# Patient Record
Sex: FEMALE | Race: WHITE | NOT HISPANIC OR LATINO | Employment: FULL TIME | URBAN - METROPOLITAN AREA
[De-identification: names, ages, dates, MRNs, and addresses within clinical notes are randomized per-mention and may not be internally consistent; named-entity substitution may affect disease eponyms.]

---
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
## 2018-01-16 ENCOUNTER — HOSPITAL ENCOUNTER (EMERGENCY)
Facility: MEDICAL CENTER | Age: 18
End: 2018-01-16
Attending: EMERGENCY MEDICINE
Payer: COMMERCIAL

## 2018-01-16 ENCOUNTER — APPOINTMENT (OUTPATIENT)
Dept: RADIOLOGY | Facility: MEDICAL CENTER | Age: 18
End: 2018-01-16
Attending: EMERGENCY MEDICINE
Payer: COMMERCIAL

## 2018-01-16 ENCOUNTER — HOSPITAL ENCOUNTER (OUTPATIENT)
Facility: MEDICAL CENTER | Age: 18
End: 2018-01-17
Attending: OBSTETRICS & GYNECOLOGY | Admitting: OBSTETRICS & GYNECOLOGY
Payer: COMMERCIAL

## 2018-01-16 ENCOUNTER — APPOINTMENT (OUTPATIENT)
Dept: RADIOLOGY | Facility: MEDICAL CENTER | Age: 18
End: 2018-01-16
Attending: OBSTETRICS & GYNECOLOGY
Payer: COMMERCIAL

## 2018-01-16 VITALS
TEMPERATURE: 98.8 F | HEIGHT: 64 IN | SYSTOLIC BLOOD PRESSURE: 129 MMHG | OXYGEN SATURATION: 95 % | HEART RATE: 107 BPM | BODY MASS INDEX: 50.02 KG/M2 | RESPIRATION RATE: 20 BRPM | WEIGHT: 293 LBS | DIASTOLIC BLOOD PRESSURE: 73 MMHG

## 2018-01-16 VITALS
WEIGHT: 293 LBS | OXYGEN SATURATION: 96 % | SYSTOLIC BLOOD PRESSURE: 132 MMHG | DIASTOLIC BLOOD PRESSURE: 84 MMHG | HEIGHT: 64 IN | BODY MASS INDEX: 50.02 KG/M2 | HEART RATE: 108 BPM

## 2018-01-16 DIAGNOSIS — J10.1 INFLUENZA B: ICD-10-CM

## 2018-01-16 DIAGNOSIS — Z3A.33 33 WEEKS GESTATION OF PREGNANCY: ICD-10-CM

## 2018-01-16 LAB
ALBUMIN SERPL BCP-MCNC: 2.9 G/DL (ref 3.2–4.9)
ALBUMIN/GLOB SERPL: 0.9 G/DL
ALP SERPL-CCNC: 72 U/L (ref 30–99)
ALT SERPL-CCNC: 17 U/L (ref 2–50)
ANION GAP SERPL CALC-SCNC: 11 MMOL/L (ref 0–11.9)
APPEARANCE UR: CLEAR
AST SERPL-CCNC: 20 U/L (ref 12–45)
BASOPHILS # BLD AUTO: 0.2 % (ref 0–1.8)
BASOPHILS # BLD: 0.01 K/UL (ref 0–0.12)
BILIRUB SERPL-MCNC: 0.5 MG/DL (ref 0.1–1.5)
BILIRUB UR QL STRIP.AUTO: NEGATIVE
BUN SERPL-MCNC: 6 MG/DL (ref 8–22)
CALCIUM SERPL-MCNC: 8.4 MG/DL (ref 8.4–10.2)
CHLORIDE SERPL-SCNC: 102 MMOL/L (ref 96–112)
CO2 SERPL-SCNC: 19 MMOL/L (ref 20–33)
COLOR UR: YELLOW
CREAT SERPL-MCNC: 0.54 MG/DL (ref 0.5–1.4)
CULTURE IF INDICATED INDCX: NO UA CULTURE
EOSINOPHIL # BLD AUTO: 0.05 K/UL (ref 0–0.51)
EOSINOPHIL NFR BLD: 1 % (ref 0–6.9)
ERYTHROCYTE [DISTWIDTH] IN BLOOD BY AUTOMATED COUNT: 39 FL (ref 35.9–50)
FLUAV+FLUBV AG SPEC QL IA: ABNORMAL
GLOBULIN SER CALC-MCNC: 3.2 G/DL (ref 1.9–3.5)
GLUCOSE SERPL-MCNC: 87 MG/DL (ref 65–99)
GLUCOSE UR STRIP.AUTO-MCNC: NEGATIVE MG/DL
HCT VFR BLD AUTO: 34.8 % (ref 37–47)
HGB BLD-MCNC: 12 G/DL (ref 12–16)
IMM GRANULOCYTES # BLD AUTO: 0.03 K/UL (ref 0–0.11)
IMM GRANULOCYTES NFR BLD AUTO: 0.6 % (ref 0–0.9)
KETONES UR STRIP.AUTO-MCNC: NEGATIVE MG/DL
LACTATE BLD-SCNC: 1.34 MMOL/L (ref 0.5–2)
LEUKOCYTE ESTERASE UR QL STRIP.AUTO: NEGATIVE
LYMPHOCYTES # BLD AUTO: 1.07 K/UL (ref 1–4.8)
LYMPHOCYTES NFR BLD: 21.9 % (ref 22–41)
MCH RBC QN AUTO: 28.6 PG (ref 27–33)
MCHC RBC AUTO-ENTMCNC: 34.5 G/DL (ref 33.6–35)
MCV RBC AUTO: 83.1 FL (ref 81.4–97.8)
MICRO URNS: NORMAL
MONOCYTES # BLD AUTO: 0.57 K/UL (ref 0–0.85)
MONOCYTES NFR BLD AUTO: 11.7 % (ref 0–13.4)
NEUTROPHILS # BLD AUTO: 3.16 K/UL (ref 2–7.15)
NEUTROPHILS NFR BLD: 64.6 % (ref 44–72)
NITRITE UR QL STRIP.AUTO: NEGATIVE
NRBC # BLD AUTO: 0 K/UL
NRBC BLD-RTO: 0 /100 WBC
PH UR STRIP.AUTO: 6 [PH]
PLATELET # BLD AUTO: 137 K/UL (ref 164–446)
PMV BLD AUTO: 10.5 FL (ref 9–12.9)
POTASSIUM SERPL-SCNC: 3.4 MMOL/L (ref 3.6–5.5)
PROT SERPL-MCNC: 6.1 G/DL (ref 6–8.2)
PROT UR QL STRIP: NEGATIVE MG/DL
RBC # BLD AUTO: 4.19 M/UL (ref 4.2–5.4)
RBC UR QL AUTO: NEGATIVE
S PYO AG THROAT QL: NORMAL
SIGNIFICANT IND 70042: ABNORMAL
SIGNIFICANT IND 70042: NORMAL
SITE SITE: ABNORMAL
SITE SITE: NORMAL
SODIUM SERPL-SCNC: 132 MMOL/L (ref 135–145)
SOURCE SOURCE: ABNORMAL
SOURCE SOURCE: NORMAL
SP GR UR STRIP.AUTO: 1.02
SPECIMEN DRAWN FROM PATIENT: NORMAL
WBC # BLD AUTO: 4.9 K/UL (ref 4.8–10.8)

## 2018-01-16 PROCEDURE — 96360 HYDRATION IV INFUSION INIT: CPT

## 2018-01-16 PROCEDURE — 71046 X-RAY EXAM CHEST 2 VIEWS: CPT

## 2018-01-16 PROCEDURE — 81003 URINALYSIS AUTO W/O SCOPE: CPT

## 2018-01-16 PROCEDURE — 83605 ASSAY OF LACTIC ACID: CPT

## 2018-01-16 PROCEDURE — 700102 HCHG RX REV CODE 250 W/ 637 OVERRIDE(OP): Performed by: EMERGENCY MEDICINE

## 2018-01-16 PROCEDURE — 99284 EMERGENCY DEPT VISIT MOD MDM: CPT

## 2018-01-16 PROCEDURE — 36415 COLL VENOUS BLD VENIPUNCTURE: CPT

## 2018-01-16 PROCEDURE — 700105 HCHG RX REV CODE 258: Performed by: EMERGENCY MEDICINE

## 2018-01-16 PROCEDURE — 87880 STREP A ASSAY W/OPTIC: CPT

## 2018-01-16 PROCEDURE — 87081 CULTURE SCREEN ONLY: CPT

## 2018-01-16 PROCEDURE — 700111 HCHG RX REV CODE 636 W/ 250 OVERRIDE (IP): Performed by: EMERGENCY MEDICINE

## 2018-01-16 PROCEDURE — A9270 NON-COVERED ITEM OR SERVICE: HCPCS | Performed by: EMERGENCY MEDICINE

## 2018-01-16 PROCEDURE — 87400 INFLUENZA A/B EACH AG IA: CPT

## 2018-01-16 PROCEDURE — 85025 COMPLETE CBC W/AUTO DIFF WBC: CPT

## 2018-01-16 PROCEDURE — 80053 COMPREHEN METABOLIC PANEL: CPT

## 2018-01-16 RX ORDER — OSELTAMIVIR PHOSPHATE 75 MG/1
75 CAPSULE ORAL ONCE
Status: COMPLETED | OUTPATIENT
Start: 2018-01-16 | End: 2018-01-16

## 2018-01-16 RX ORDER — OSELTAMIVIR PHOSPHATE 75 MG/1
75 CAPSULE ORAL 2 TIMES DAILY
Qty: 10 CAP | Refills: 0 | Status: SHIPPED | OUTPATIENT
Start: 2018-01-16 | End: 2018-01-16

## 2018-01-16 RX ORDER — SODIUM CHLORIDE 9 MG/ML
1000 INJECTION, SOLUTION INTRAVENOUS ONCE
Status: COMPLETED | OUTPATIENT
Start: 2018-01-16 | End: 2018-01-16

## 2018-01-16 RX ORDER — ACETAMINOPHEN 325 MG/1
650 TABLET ORAL ONCE
Status: COMPLETED | OUTPATIENT
Start: 2018-01-16 | End: 2018-01-16

## 2018-01-16 RX ORDER — OSELTAMIVIR PHOSPHATE 75 MG/1
75 CAPSULE ORAL 2 TIMES DAILY
Qty: 10 CAP | Refills: 0 | Status: SHIPPED | OUTPATIENT
Start: 2018-01-16 | End: 2018-01-21

## 2018-01-16 RX ORDER — ONDANSETRON 4 MG/1
4 TABLET, ORALLY DISINTEGRATING ORAL ONCE
Status: COMPLETED | OUTPATIENT
Start: 2018-01-16 | End: 2018-01-16

## 2018-01-16 RX ADMIN — ONDANSETRON 4 MG: 4 TABLET, ORALLY DISINTEGRATING ORAL at 17:45

## 2018-01-16 RX ADMIN — ACETAMINOPHEN 650 MG: 325 TABLET, FILM COATED ORAL at 18:35

## 2018-01-16 RX ADMIN — OSELTAMIVIR PHOSPHATE 75 MG: 75 CAPSULE ORAL at 17:45

## 2018-01-16 RX ADMIN — SODIUM CHLORIDE 1000 ML: 9 INJECTION, SOLUTION INTRAVENOUS at 20:46

## 2018-01-16 ASSESSMENT — PAIN SCALES - GENERAL: PAINLEVEL_OUTOF10: 3

## 2018-01-17 PROCEDURE — 76819 FETAL BIOPHYS PROFIL W/O NST: CPT

## 2018-01-17 NOTE — PROGRESS NOTES
Report called to charge nurse for L& D. Patient DCd with paperwork and told to go directly to ED at [facility]

## 2018-01-17 NOTE — DISCHARGE INSTRUCTIONS
Go directly to labor and delivery for evaluation.  Return to the ER if worsening fever, cough, shortness of breath or other concerns.      Pregnancy and Influenza  Influenza, also called the flu, is an infection of the respiratory tract. If you are pregnant, you are more likely to catch the flu. You are also more likely to have a more serious case of the flu. This is because pregnancy lowers your body's ability to fight off infections (it weakens your immune system). It also puts additional stress on your heart and lungs, which makes you more likely to have complications. Having a bad case of the flu, especially with a high fever, can be dangerous for your developing baby. It can cause you to go into early labor.  HOW DO PEOPLE GET THE FLU?  The flu is caused by the influenza virus. This virus is common every year in the fall and winter. It spreads when virus particles get passed from person to person. You can get the virus if you are near a sick person who is coughing or sneezing. You can also get the virus if you touch something that has the virus on it and then touch your face.  HOW CAN I PROTECT MYSELF AGAINST THE FLU?  · Get a flu shot. The best way to prevent the flu is to get a flu shot before flu season starts. The flu shot is not dangerous for your developing baby. It may even help protect your baby from the flu for up to 6 months after birth. The flu shot is one type of flu vaccine. Another type is a nasal spray vaccine. Do not get the nasal spray vaccine. It is not approved for pregnancy.  · Do not come in close contact with sick people.  · Do not share food, drinks, or utensils with other people.  · Wash your hands often. Use hand  when soap and water are not available.  WHAT SHOULD I DO IF I HAVE FLU SYMPTOMS?  If you have any flu symptoms, call your health care provider right away. Flu symptoms include:  · Fever or chills.  · Muscle aches.  · Headache.  · Sore throat.  · Nasal  congestion.  · Cough.  · Feeling tired.  · Loss of appetite.  · Vomiting.  · Diarrhea.  You may be able to take an antiviral medicine to keep the flu from becoming severe and to shorten how long it lasts.  WHAT SHOULD I DO AT HOME IF I AM DIAGNOSED WITH THE FLU?  · Do not take any medicine, including cold or flu medicine, unless directed by your health care provider.  · If you take antiviral medicine, make sure you finish it even if you start to feel better.  · Drink enough fluid to keep your urine clear or pale yellow.  · Get plenty of rest.  WHEN WOULD I SEEK IMMEDIATE MEDICAL CARE IF I HAVE THE FLU?  · You have trouble breathing.  · You have chest pain.  · You begin to have labor pains.  · You have a high fever that does not go down after you take medicine.  · You do not feel your baby move.  · You have diarrhea or vomiting that will not go away.     This information is not intended to replace advice given to you by your health care provider. Make sure you discuss any questions you have with your health care provider.     Document Released: 10/20/2009 Document Revised: 12/23/2014 Document Reviewed: 11/14/2014  [employer] Interactive Patient Education ©2016 [employer]

## 2018-01-17 NOTE — PROGRESS NOTES
EDC  34w4d. Pt presents to L&D for NST after being diagnosed with the flu at [facility].      TOCO and EFM applied, VSS. Pt's HR slightly elevated. PT was diagnosed with the flu and was recommenced by the ED doctor to come here for fetal assessment. PT reports +FM, denies LOF or VB. Due to maternal habitus, difficult to trace FHT after aprox. 20 mins by this RN. Will update Dr. Reddy.     Dr. Reddy updated, orders to obtain BPP received.     0025 US at bedside    0045 BPP , will update Dr. Reddy on pt status    0050 RN at bedside,  labor precautions given, all questions answered.     005 Pt discharged home in stable condition

## 2018-01-17 NOTE — PROGRESS NOTES
PAtient given instructions on where to go. PAtient IV removed and patient verbalized understanding. PAtient will be transferred POV.

## 2018-01-17 NOTE — ED PROVIDER NOTES
ED Provider Note    CHIEF COMPLAINT  Chief Complaint   Patient presents with   • Cough     cough runny nose  vomiting and diarrhea are now gone  cough is bad       HPI  Mitzi Abel is a 17 y.o. female who presents to the ER complaining of nausea, vomiting, diarrhea, and cough.  Symptoms started 2 days ago with vomiting and diarrhea.  This seemed to taper off.  Yesterday she developed runny nose and congestion and cough.  She has some chest wall discomfort associated with coughing is mild associated dyspnea.  She's been tolerating food and fluids.  Her last 24 hours.  She is pregnant.  Nausea problems urinating.  She is feeling baby move.  She has no abdominal pain, vaginal bleeding, fluid leakage or discharge.  She denies any headache.  She denies any other acute concerns or complaints.    REVIEW OF SYSTEMS  See HPI for further details. All other systems are negative.    PAST MEDICAL HISTORY  Past Medical History:   Diagnosis Date   • BMI, pediatric > 99% for age 10/15/2010   • Borderline personality disorder    • Eczema 10/15/2010   • Elevated blood pressure 10/15/2010   • H/O: Bell's palsy 12/26/08   • Insulin resistance 12/7/2011   • Major depressive disorder    • Myopia 10/15/2010   • Wheeze 10/15/2010       FAMILY HISTORY  Family History   Problem Relation Age of Onset   • Diabetes Mother      Gestational DM   • Genetic Mother      insulin resistance   • Hypertension Father    • Alcohol/Drug Father    • Thyroid Paternal Grandmother    • Diabetes Paternal Grandfather    • Heart Disease Paternal Grandfather    • Hypertension Paternal Grandfather    • Cancer Paternal Grandfather    • Psychiatry Brother      Bipolar/Bipolar       SOCIAL HISTORY  Social History     Social History   • Marital status:      Spouse name: N/A   • Number of children: N/A   • Years of education: N/A     Social History Main Topics   • Smoking status: Current Every Day Smoker     Packs/day: 0.25     Types: Cigarettes   •  "Smokeless tobacco: Never Used      Comment: 4 daily   • Alcohol use No   • Drug use: No      Comment: Stopped sence January   • Sexual activity: Yes     Partners: Male     Birth control/ protection: Condom      Comment: 1 month ago     Other Topics Concern   • Not on file     Social History Narrative   • No narrative on file       SURGICAL HISTORY  History reviewed. No pertinent surgical history.    CURRENT MEDICATIONS  Home Medications     Reviewed by Hernan Whitfield (Pharmacy Tech) on 01/16/18 at 1711  Med List Status: Complete   Medication Last Dose Status        Patient Uli Taking any Medications                       ALLERGIES  Allergies   Allergen Reactions   • Penicillins        PHYSICAL EXAM  VITAL SIGNS: /85   Pulse (!) 101   Temp 37.1 °C (98.8 °F)   Resp 20   Ht 1.626 m (5' 4\")   Wt (!) 143.5 kg (316 lb 5.8 oz)   LMP 04/30/2017 (Approximate)   SpO2 95%   BMI 54.30 kg/m²    Constitutional: Well developed, Well nourished, No acute distress, Non-toxic appearance.   HENT: Normocephalic, Atraumatic, Bilateral external ears normal, Oropharynx moist, No oral exudates, swollen nasal mucosa, clear rhinorrhea.  Mild pharyngeal erythema.  No exudates.  Eyes: PERRL, EOMI, Conjunctiva normal, No discharge.   Neck: Normal range of motion, No tenderness, Supple, No stridor.   Cardiovascular: Normal heart rate, Normal rhythm, No murmurs, No rubs, No gallops.   Thorax & Lungs: Normal breath sounds, No respiratory distress, No wheezing, No chest tenderness.   Abdomen: Bowel sounds normal, Soft, No tenderness  Skin: Warm, Dry, No erythema, No rash.   Back: No tenderness, No CVA tenderness.   Musculoskeletal: Good range of motion in all major joints.   Neurologic: Alert & oriented x 3, No focal deficits noted.   Psychiatric: Affect normal        RADIOLOGY/PROCEDURES  DX-CHEST-2 VIEWS   Final Result      Mild linear retrocardiac opacity likely represents atelectasis.          COURSE & MEDICAL DECISION " MAKING  Pertinent Labs & Imaging studies reviewed. (See chart for details)  Patient presents to ER with cough and cold symptoms and has influenza.  Strep test is negative.  Chest x-ray does not show an infiltrate.  The patient's urinalysis is negative.    Patient was here for some time, being observed getting oral fluids and antipyretics and some Tamiflu.  The patient has been persistent tachycardia and she looked terrible.  She also does not look well.  Bedside ultrasound was done to her fetal heart tones 130 range.        The patient is here for a prolonged period of time, especially tachycardic.  I think she should be admitted for influenza and pregnancy.  I spoke with Dr. Reddy, on call for her ObGyn doctor and he wanted to see her before she was admitted.  He requested I send her to [facility] to be evaluated at labor and delivery.  The patient is agreeable to this.  She received a liter of saline.  Her labs are reassuring.  I discussed and the fetal heart rate of mostly 120s to 130 range.  She felt this was normal and not urgent.  He will see her in labor and delivery tonight.    The patient and family will go directly there.  She is given very close return precautions for influenza given she is discharged.  She is prescribed Tamiflu.  She is to return if worsening cough, shortness of breath, fever, ill appearance, or other concerns.  She is transferred via POV [facility] to Dr. Reddy for continued evaluation and treatment of her pregnancy influenza.      Addendum: The patient was given 1 L of saline while here.  Indication for IV fluids while here.  His persistent tachycardia in the setting of an illness with a prodrome of vomiting and diarrhea and potential volume loss.    FINAL IMPRESSION  1. Influenza B    2. 28 weeks gestation of pregnancy        2.   3.         Electronically signed by: Thanh Mcdaniel, 1/16/2018 5:23 PM

## 2018-01-17 NOTE — ED NOTES
ERP to bedside for re-evaluation of fetal heart tones. New orders received and implemented. Patient updated on plan of care including transfer to [facility].   .   Partner educated on smoking cessation.

## 2018-01-18 LAB
S PYO SPEC QL CULT: NORMAL
SIGNIFICANT IND 70042: NORMAL
SITE SITE: NORMAL
SOURCE SOURCE: NORMAL

## 2018-02-01 ENCOUNTER — HOSPITAL ENCOUNTER (OUTPATIENT)
Facility: MEDICAL CENTER | Age: 18
End: 2018-02-01
Attending: OBSTETRICS & GYNECOLOGY | Admitting: OBSTETRICS & GYNECOLOGY
Payer: COMMERCIAL

## 2018-02-01 VITALS — SYSTOLIC BLOOD PRESSURE: 140 MMHG | DIASTOLIC BLOOD PRESSURE: 90 MMHG | HEART RATE: 89 BPM

## 2018-02-01 LAB
ALBUMIN SERPL BCP-MCNC: 3.6 G/DL (ref 3.2–4.9)
ALBUMIN/GLOB SERPL: 1.3 G/DL
ALP SERPL-CCNC: 77 U/L (ref 30–99)
ALT SERPL-CCNC: 7 U/L (ref 2–50)
ANION GAP SERPL CALC-SCNC: 8 MMOL/L (ref 0–11.9)
AST SERPL-CCNC: 10 U/L (ref 12–45)
BASOPHILS # BLD AUTO: 0.4 % (ref 0–1.8)
BASOPHILS # BLD: 0.03 K/UL (ref 0–0.12)
BILIRUB SERPL-MCNC: 0.4 MG/DL (ref 0.1–1.5)
BUN SERPL-MCNC: 7 MG/DL (ref 8–22)
CALCIUM SERPL-MCNC: 8.9 MG/DL (ref 8.5–10.5)
CHLORIDE SERPL-SCNC: 106 MMOL/L (ref 96–112)
CO2 SERPL-SCNC: 21 MMOL/L (ref 20–33)
CREAT SERPL-MCNC: 0.4 MG/DL (ref 0.5–1.4)
CREAT UR-MCNC: 64.1 MG/DL
EOSINOPHIL # BLD AUTO: 0.05 K/UL (ref 0–0.51)
EOSINOPHIL NFR BLD: 0.6 % (ref 0–6.9)
ERYTHROCYTE [DISTWIDTH] IN BLOOD BY AUTOMATED COUNT: 40.3 FL (ref 35.9–50)
GLOBULIN SER CALC-MCNC: 2.8 G/DL (ref 1.9–3.5)
GLUCOSE SERPL-MCNC: 96 MG/DL (ref 65–99)
HCT VFR BLD AUTO: 35.4 % (ref 37–47)
HGB BLD-MCNC: 12.1 G/DL (ref 12–16)
IMM GRANULOCYTES # BLD AUTO: 0.03 K/UL (ref 0–0.11)
IMM GRANULOCYTES NFR BLD AUTO: 0.4 % (ref 0–0.9)
LYMPHOCYTES # BLD AUTO: 1.9 K/UL (ref 1–4.8)
LYMPHOCYTES NFR BLD: 22.7 % (ref 22–41)
MCH RBC QN AUTO: 29.2 PG (ref 27–33)
MCHC RBC AUTO-ENTMCNC: 34.2 G/DL (ref 33.6–35)
MCV RBC AUTO: 85.3 FL (ref 81.4–97.8)
MONOCYTES # BLD AUTO: 0.81 K/UL (ref 0–0.85)
MONOCYTES NFR BLD AUTO: 9.7 % (ref 0–13.4)
NEUTROPHILS # BLD AUTO: 5.54 K/UL (ref 2–7.15)
NEUTROPHILS NFR BLD: 66.2 % (ref 44–72)
NRBC # BLD AUTO: 0 K/UL
NRBC BLD-RTO: 0 /100 WBC
PLATELET # BLD AUTO: 185 K/UL (ref 164–446)
PMV BLD AUTO: 11 FL (ref 9–12.9)
POTASSIUM SERPL-SCNC: 3.6 MMOL/L (ref 3.6–5.5)
PROT SERPL-MCNC: 6.4 G/DL (ref 6–8.2)
PROT UR-MCNC: 9.2 MG/DL (ref 0–15)
PROT/CREAT UR: 144 MG/G (ref 10–107)
RBC # BLD AUTO: 4.15 M/UL (ref 4.2–5.4)
SODIUM SERPL-SCNC: 135 MMOL/L (ref 135–145)
URATE SERPL-MCNC: 3.3 MG/DL (ref 1.9–8.2)
WBC # BLD AUTO: 8.4 K/UL (ref 4.8–10.8)

## 2018-02-01 PROCEDURE — 84156 ASSAY OF PROTEIN URINE: CPT

## 2018-02-01 PROCEDURE — 82570 ASSAY OF URINE CREATININE: CPT

## 2018-02-01 PROCEDURE — 59025 FETAL NON-STRESS TEST: CPT | Performed by: OBSTETRICS & GYNECOLOGY

## 2018-02-01 PROCEDURE — 85025 COMPLETE CBC W/AUTO DIFF WBC: CPT

## 2018-02-01 PROCEDURE — 80053 COMPREHEN METABOLIC PANEL: CPT

## 2018-02-01 PROCEDURE — 36415 COLL VENOUS BLD VENIPUNCTURE: CPT

## 2018-02-01 PROCEDURE — 84550 ASSAY OF BLOOD/URIC ACID: CPT

## 2018-02-02 NOTE — PROGRESS NOTES
Patient came over for [facility] labs.she is a G-1 P-0 with EDC of 2-23 which makes her 36.6.EFM applied and POC discussed.serial B/P's.1710 lab here to draw labs.1756 obtained a reactive strip with moderate variability.baby is moving all over.Dr lomeli here-she was updated,looked at labs and strip.patient discharged home in stable condition with fob.

## 2018-02-03 ENCOUNTER — HOSPITAL ENCOUNTER (OUTPATIENT)
Facility: MEDICAL CENTER | Age: 18
End: 2018-02-04
Attending: OBSTETRICS & GYNECOLOGY | Admitting: OBSTETRICS & GYNECOLOGY
Payer: COMMERCIAL

## 2018-02-03 VITALS
DIASTOLIC BLOOD PRESSURE: 73 MMHG | HEIGHT: 64 IN | TEMPERATURE: 98.7 F | WEIGHT: 293 LBS | HEART RATE: 96 BPM | BODY MASS INDEX: 50.02 KG/M2 | SYSTOLIC BLOOD PRESSURE: 119 MMHG

## 2018-02-03 PROCEDURE — 81002 URINALYSIS NONAUTO W/O SCOPE: CPT

## 2018-02-04 NOTE — PROGRESS NOTES
"2320- [facility] 2018 37.1 weeks presents to L&D with c/o headache that started around 1600 today and unrelieved by tylenol. Pt was sent over from office on 2018 for PIH workup. Labs WNL. TOCO/FM applied. BP WNL, UA clear with no protein.  Unable to get FHT for >15 seconds at a time d/t patients large habitus. -150 when able to . RN spend more than 40 minutes with 2 RN's trying to keep baby on for reactive NST.  0020-Dr. Thompson called and updated on pt. And  Aware that NST was unable to obtain. Orders rcvd to give pt 2 percocet for headache and monitor for effectiveness. If pt's headache better may DC home.  0030-RN at bedside to offer percocet to pt but pt states, \"Me and pain meds dont get along. I just throw them up\". Pt refusing any meds and just wants to go home. DC instructions and labor precautions reviewed with pt and FOB. Pt has appt. On Wednesday in the office. Pt ambulated out in stable condition.  "

## 2018-02-05 LAB
APPEARANCE UR: CLEAR
COLOR UR AUTO: YELLOW
GLUCOSE UR QL STRIP.AUTO: NEGATIVE MG/DL
KETONES UR QL STRIP.AUTO: NEGATIVE MG/DL
LEUKOCYTE ESTERASE UR QL STRIP.AUTO: ABNORMAL
NITRITE UR QL STRIP.AUTO: NEGATIVE
PH UR STRIP.AUTO: 6 [PH]
PROT UR QL STRIP: NEGATIVE MG/DL
RBC UR QL AUTO: NEGATIVE
SP GR UR: 1.01

## 2018-02-20 ENCOUNTER — HOSPITAL ENCOUNTER (OUTPATIENT)
Facility: MEDICAL CENTER | Age: 18
End: 2018-02-20
Attending: OBSTETRICS & GYNECOLOGY | Admitting: OBSTETRICS & GYNECOLOGY
Payer: COMMERCIAL

## 2018-02-20 VITALS — SYSTOLIC BLOOD PRESSURE: 144 MMHG | HEART RATE: 96 BPM | TEMPERATURE: 99 F | DIASTOLIC BLOOD PRESSURE: 80 MMHG

## 2018-02-20 PROCEDURE — 59025 FETAL NON-STRESS TEST: CPT | Performed by: OBSTETRICS & GYNECOLOGY

## 2018-02-20 NOTE — PROGRESS NOTES
Came in for ob check.EFM applied and POC discussed.she is due 2-22 which makes her 39.5weeks.she has not felt her baby move for 3 days.found FHR.hand holding doppler because baby is moving well.patient has anterior placenta.several accels with moderate variability.patient does feel baby move.Dr mcmahon called and informed.patient can be discharged home and to keep her appt in a couple of days with Dr mcmahon..patient encouraged to do kick counts and to eat and drink before doing them.It was reinforced to never wait 3 days of no fetal movement to come into hospital.It was explained that if she doesn't get her kick counts to come to the hospital to be monitored.patient said she understands.1515 discharged home in stable condition with fob.

## 2018-02-27 ENCOUNTER — HOSPITAL ENCOUNTER (INPATIENT)
Facility: MEDICAL CENTER | Age: 18
LOS: 3 days | End: 2018-03-02
Attending: OBSTETRICS & GYNECOLOGY | Admitting: OBSTETRICS & GYNECOLOGY
Payer: COMMERCIAL

## 2018-02-27 LAB
ALBUMIN SERPL BCP-MCNC: 3.5 G/DL (ref 3.2–4.9)
ALBUMIN/GLOB SERPL: 1 G/DL
ALP SERPL-CCNC: 106 U/L (ref 30–99)
ALT SERPL-CCNC: 8 U/L (ref 2–50)
ANION GAP SERPL CALC-SCNC: 11 MMOL/L (ref 0–11.9)
APPEARANCE UR: CLEAR
AST SERPL-CCNC: 10 U/L (ref 12–45)
BASOPHILS # BLD AUTO: 0.4 % (ref 0–1.8)
BASOPHILS # BLD: 0.05 K/UL (ref 0–0.12)
BILIRUB SERPL-MCNC: 0.3 MG/DL (ref 0.1–1.5)
BUN SERPL-MCNC: 9 MG/DL (ref 8–22)
CALCIUM SERPL-MCNC: 9.6 MG/DL (ref 8.5–10.5)
CHLORIDE SERPL-SCNC: 106 MMOL/L (ref 96–112)
CO2 SERPL-SCNC: 17 MMOL/L (ref 20–33)
COLOR UR AUTO: YELLOW
CREAT SERPL-MCNC: 0.53 MG/DL (ref 0.5–1.4)
EOSINOPHIL # BLD AUTO: 0.06 K/UL (ref 0–0.51)
EOSINOPHIL NFR BLD: 0.5 % (ref 0–6.9)
ERYTHROCYTE [DISTWIDTH] IN BLOOD BY AUTOMATED COUNT: 39.4 FL (ref 35.9–50)
GLOBULIN SER CALC-MCNC: 3.5 G/DL (ref 1.9–3.5)
GLUCOSE SERPL-MCNC: 97 MG/DL (ref 65–99)
GLUCOSE UR QL STRIP.AUTO: NEGATIVE MG/DL
HCT VFR BLD AUTO: 39.8 % (ref 37–47)
HGB BLD-MCNC: 13.1 G/DL (ref 12–16)
HOLDING TUBE BB 8507: NORMAL
IMM GRANULOCYTES # BLD AUTO: 0.05 K/UL (ref 0–0.11)
IMM GRANULOCYTES NFR BLD AUTO: 0.4 % (ref 0–0.9)
KETONES UR QL STRIP.AUTO: NEGATIVE MG/DL
LEUKOCYTE ESTERASE UR QL STRIP.AUTO: ABNORMAL
LYMPHOCYTES # BLD AUTO: 2.23 K/UL (ref 1–4.8)
LYMPHOCYTES NFR BLD: 20.1 % (ref 22–41)
MCH RBC QN AUTO: 28.1 PG (ref 27–33)
MCHC RBC AUTO-ENTMCNC: 32.9 G/DL (ref 33.6–35)
MCV RBC AUTO: 85.4 FL (ref 81.4–97.8)
MONOCYTES # BLD AUTO: 0.75 K/UL (ref 0–0.85)
MONOCYTES NFR BLD AUTO: 6.7 % (ref 0–13.4)
NEUTROPHILS # BLD AUTO: 7.98 K/UL (ref 2–7.15)
NEUTROPHILS NFR BLD: 71.9 % (ref 44–72)
NITRITE UR QL STRIP.AUTO: NEGATIVE
NRBC # BLD AUTO: 0 K/UL
NRBC BLD-RTO: 0 /100 WBC
PH UR STRIP.AUTO: 6.5 [PH]
PLATELET # BLD AUTO: 195 K/UL (ref 164–446)
PMV BLD AUTO: 11.4 FL (ref 9–12.9)
POTASSIUM SERPL-SCNC: 4 MMOL/L (ref 3.6–5.5)
PROT SERPL-MCNC: 7 G/DL (ref 6–8.2)
PROT UR QL STRIP: NEGATIVE MG/DL
RBC # BLD AUTO: 4.66 M/UL (ref 4.2–5.4)
RBC UR QL AUTO: NEGATIVE
SODIUM SERPL-SCNC: 134 MMOL/L (ref 135–145)
SP GR UR: 1.02
URATE SERPL-MCNC: 4.5 MG/DL (ref 1.9–8.2)
WBC # BLD AUTO: 11.1 K/UL (ref 4.8–10.8)

## 2018-02-27 PROCEDURE — 81002 URINALYSIS NONAUTO W/O SCOPE: CPT

## 2018-02-27 PROCEDURE — 84156 ASSAY OF PROTEIN URINE: CPT

## 2018-02-27 PROCEDURE — 85025 COMPLETE CBC W/AUTO DIFF WBC: CPT

## 2018-02-27 PROCEDURE — 770002 HCHG ROOM/CARE - OB PRIVATE (112)

## 2018-02-27 PROCEDURE — 82570 ASSAY OF URINE CREATININE: CPT

## 2018-02-27 PROCEDURE — 80053 COMPREHEN METABOLIC PANEL: CPT

## 2018-02-27 PROCEDURE — 84550 ASSAY OF BLOOD/URIC ACID: CPT

## 2018-02-27 RX ORDER — SODIUM CHLORIDE, SODIUM LACTATE, POTASSIUM CHLORIDE, CALCIUM CHLORIDE 600; 310; 30; 20 MG/100ML; MG/100ML; MG/100ML; MG/100ML
INJECTION, SOLUTION INTRAVENOUS CONTINUOUS
Status: DISPENSED | OUTPATIENT
Start: 2018-02-27 | End: 2018-02-28

## 2018-02-27 RX ORDER — ONDANSETRON 2 MG/ML
4 INJECTION INTRAMUSCULAR; INTRAVENOUS EVERY 6 HOURS PRN
Status: DISCONTINUED | OUTPATIENT
Start: 2018-02-27 | End: 2018-02-28 | Stop reason: HOSPADM

## 2018-02-27 RX ORDER — CITRIC ACID/SODIUM CITRATE 334-500MG
30 SOLUTION, ORAL ORAL EVERY 6 HOURS PRN
Status: DISCONTINUED | OUTPATIENT
Start: 2018-02-27 | End: 2018-02-28 | Stop reason: HOSPADM

## 2018-02-27 RX ORDER — ACETAMINOPHEN 325 MG/1
325 TABLET ORAL EVERY 4 HOURS PRN
Status: DISCONTINUED | OUTPATIENT
Start: 2018-02-27 | End: 2018-02-28 | Stop reason: HOSPADM

## 2018-02-27 RX ORDER — METOCLOPRAMIDE HYDROCHLORIDE 5 MG/ML
10 INJECTION INTRAMUSCULAR; INTRAVENOUS EVERY 6 HOURS PRN
Status: DISCONTINUED | OUTPATIENT
Start: 2018-02-27 | End: 2018-02-28 | Stop reason: HOSPADM

## 2018-02-27 RX ORDER — OXYCODONE HYDROCHLORIDE AND ACETAMINOPHEN 5; 325 MG/1; MG/1
1 TABLET ORAL EVERY 4 HOURS PRN
Status: DISCONTINUED | OUTPATIENT
Start: 2018-02-27 | End: 2018-02-28 | Stop reason: HOSPADM

## 2018-02-27 RX ORDER — OXYCODONE AND ACETAMINOPHEN 10; 325 MG/1; MG/1
1 TABLET ORAL EVERY 4 HOURS PRN
Status: DISCONTINUED | OUTPATIENT
Start: 2018-02-27 | End: 2018-02-28 | Stop reason: HOSPADM

## 2018-02-27 RX ORDER — MISOPROSTOL 200 UG/1
800 TABLET ORAL
Status: DISCONTINUED | OUTPATIENT
Start: 2018-02-27 | End: 2018-02-28 | Stop reason: HOSPADM

## 2018-02-27 RX ORDER — IBUPROFEN 600 MG/1
600 TABLET ORAL EVERY 6 HOURS PRN
Status: DISCONTINUED | OUTPATIENT
Start: 2018-02-27 | End: 2018-02-28 | Stop reason: HOSPADM

## 2018-02-27 RX ORDER — CARBOPROST TROMETHAMINE 250 UG/ML
250 INJECTION, SOLUTION INTRAMUSCULAR
Status: DISCONTINUED | OUTPATIENT
Start: 2018-02-27 | End: 2018-02-28 | Stop reason: HOSPADM

## 2018-02-27 RX ORDER — ONDANSETRON 4 MG/1
4 TABLET, ORALLY DISINTEGRATING ORAL EVERY 6 HOURS PRN
Status: DISCONTINUED | OUTPATIENT
Start: 2018-02-27 | End: 2018-02-28 | Stop reason: HOSPADM

## 2018-02-27 ASSESSMENT — PATIENT HEALTH QUESTIONNAIRE - PHQ9
2. FEELING DOWN, DEPRESSED, IRRITABLE, OR HOPELESS: NOT AT ALL
1. LITTLE INTEREST OR PLEASURE IN DOING THINGS: NOT AT ALL
SUM OF ALL RESPONSES TO PHQ QUESTIONS 1-9: 0
SUM OF ALL RESPONSES TO PHQ9 QUESTIONS 1 AND 2: 0

## 2018-02-27 ASSESSMENT — LIFESTYLE VARIABLES
ALCOHOL_USE: NO
DO YOU DRINK ALCOHOL: NO
EVER_SMOKED: YES

## 2018-02-28 LAB
AMPHET UR QL SCN: NEGATIVE
BARBITURATES UR QL SCN: NEGATIVE
BENZODIAZ UR QL SCN: NEGATIVE
BZE UR QL SCN: NEGATIVE
CANNABINOIDS UR QL SCN: NEGATIVE
CREAT UR-MCNC: 119.7 MG/DL
METHADONE UR QL SCN: NEGATIVE
OPIATES UR QL SCN: NEGATIVE
OXYCODONE UR QL SCN: NEGATIVE
PCP UR QL SCN: NEGATIVE
PROPOXYPH UR QL SCN: NEGATIVE
PROT UR-MCNC: 10.9 MG/DL (ref 0–15)
PROT/CREAT UR: 91 MG/G (ref 10–107)

## 2018-02-28 PROCEDURE — 700101 HCHG RX REV CODE 250

## 2018-02-28 PROCEDURE — 59409 OBSTETRICAL CARE: CPT

## 2018-02-28 PROCEDURE — 36415 COLL VENOUS BLD VENIPUNCTURE: CPT

## 2018-02-28 PROCEDURE — 700101 HCHG RX REV CODE 250: Performed by: OBSTETRICS & GYNECOLOGY

## 2018-02-28 PROCEDURE — 303615 HCHG EPIDURAL/SPINAL ANESTHESIA FOR LABOR

## 2018-02-28 PROCEDURE — 700111 HCHG RX REV CODE 636 W/ 250 OVERRIDE (IP): Performed by: OBSTETRICS & GYNECOLOGY

## 2018-02-28 PROCEDURE — 770002 HCHG ROOM/CARE - OB PRIVATE (112)

## 2018-02-28 PROCEDURE — 700105 HCHG RX REV CODE 258: Performed by: OBSTETRICS & GYNECOLOGY

## 2018-02-28 PROCEDURE — 0UQGXZZ REPAIR VAGINA, EXTERNAL APPROACH: ICD-10-PCS | Performed by: OBSTETRICS & GYNECOLOGY

## 2018-02-28 PROCEDURE — 80307 DRUG TEST PRSMV CHEM ANLYZR: CPT

## 2018-02-28 PROCEDURE — 700102 HCHG RX REV CODE 250 W/ 637 OVERRIDE(OP): Performed by: OBSTETRICS & GYNECOLOGY

## 2018-02-28 PROCEDURE — 700111 HCHG RX REV CODE 636 W/ 250 OVERRIDE (IP)

## 2018-02-28 PROCEDURE — A9270 NON-COVERED ITEM OR SERVICE: HCPCS | Performed by: OBSTETRICS & GYNECOLOGY

## 2018-02-28 PROCEDURE — 304965 HCHG RECOVERY SERVICES

## 2018-02-28 RX ORDER — SODIUM CHLORIDE, SODIUM LACTATE, POTASSIUM CHLORIDE, CALCIUM CHLORIDE 600; 310; 30; 20 MG/100ML; MG/100ML; MG/100ML; MG/100ML
INJECTION, SOLUTION INTRAVENOUS PRN
Status: DISCONTINUED | OUTPATIENT
Start: 2018-02-28 | End: 2018-03-02 | Stop reason: HOSPADM

## 2018-02-28 RX ORDER — LABETALOL HYDROCHLORIDE 5 MG/ML
10 INJECTION, SOLUTION INTRAVENOUS ONCE
Status: COMPLETED | OUTPATIENT
Start: 2018-02-28 | End: 2018-02-28

## 2018-02-28 RX ORDER — ROPIVACAINE HYDROCHLORIDE 2 MG/ML
INJECTION, SOLUTION EPIDURAL; INFILTRATION; PERINEURAL
Status: COMPLETED
Start: 2018-02-28 | End: 2018-02-28

## 2018-02-28 RX ORDER — MISOPROSTOL 200 UG/1
600 TABLET ORAL
Status: DISCONTINUED | OUTPATIENT
Start: 2018-02-28 | End: 2018-03-02 | Stop reason: HOSPADM

## 2018-02-28 RX ORDER — DOCUSATE SODIUM 100 MG/1
100 CAPSULE, LIQUID FILLED ORAL 2 TIMES DAILY PRN
Status: DISCONTINUED | OUTPATIENT
Start: 2018-02-28 | End: 2018-03-02 | Stop reason: HOSPADM

## 2018-02-28 RX ORDER — BUPIVACAINE HYDROCHLORIDE 2.5 MG/ML
INJECTION, SOLUTION EPIDURAL; INFILTRATION; INTRACAUDAL
Status: ACTIVE
Start: 2018-02-28 | End: 2018-02-28

## 2018-02-28 RX ADMIN — Medication 1 MILLI-UNITS/MIN: at 10:00

## 2018-02-28 RX ADMIN — IBUPROFEN 600 MG: 600 TABLET, FILM COATED ORAL at 17:15

## 2018-02-28 RX ADMIN — Medication 125 ML/HR: at 19:28

## 2018-02-28 RX ADMIN — ROPIVACAINE HYDROCHLORIDE 100 ML: 2 INJECTION, SOLUTION EPIDURAL; INFILTRATION; PERINEURAL at 10:04

## 2018-02-28 RX ADMIN — LABETALOL HYDROCHLORIDE 10 MG: 5 INJECTION, SOLUTION INTRAVENOUS at 18:22

## 2018-02-28 RX ADMIN — SODIUM CHLORIDE, POTASSIUM CHLORIDE, SODIUM LACTATE AND CALCIUM CHLORIDE: 600; 310; 30; 20 INJECTION, SOLUTION INTRAVENOUS at 01:06

## 2018-02-28 RX ADMIN — Medication 2 MILLI-UNITS/MIN: at 05:15

## 2018-02-28 RX ADMIN — ROPIVACAINE HYDROCHLORIDE 100 ML: 2 INJECTION, SOLUTION EPIDURAL; INFILTRATION; PERINEURAL at 01:19

## 2018-02-28 RX ADMIN — SODIUM CHLORIDE, POTASSIUM CHLORIDE, SODIUM LACTATE AND CALCIUM CHLORIDE: 600; 310; 30; 20 INJECTION, SOLUTION INTRAVENOUS at 00:45

## 2018-02-28 ASSESSMENT — PAIN SCALES - GENERAL
PAINLEVEL_OUTOF10: 2
PAINLEVEL_OUTOF10: 0

## 2018-02-28 NOTE — PROGRESS NOTES
1250 MD at bedside. C/-1. Orders to labor down for one hour. FOB, mom, and sister at bedside.    1300 report from RAI Lomeli. Pt stable and alert- feeling urge to push. Will reassess after 1 hour.    1345 pt called out for back pain. Pt assessed with alcohol pad- dermatome T10. Pt repositioned and educated that discomfort is common to feel once cervix has become complete. Pt understands. Will begin pushing at appx 1400.    1415 Start pushing.    1616 MD requested at bedside. Pt coping poorly with pushing. Pt is teary and keeps stating 'I can't, you need to get it out'.     1617 MD at bedside. MD offers vacuum to pt to assist in extraction. Pt accepts.    1621 Charge RN notified of use of vacuum and requests assistance at bedside. Transition RN called for pick-up. RT called for pick-up due to mod meconium.    1622 MELANIE Sharpe ST at bedside.    1625 CAT Frey Supervisor, Sandhya RT, and FILIPPO Cali pick-up nurse at bedside.    1634 One pop-off of vacuum.    1635 Vacuum assisted delivery of viable female. APGARs 8/9.     1637 Spontaneous delivery of placenta. Normal and intact.    1710 Pt reports 4/10 soreness pain in perineum and abdomen. RN offers ibuprofen; pt accepts.    1730 RN struggles to find fundus due to large pannus. Asks MANOLO Boles RN for assistance.    1805 MD notified of elevated BP. Trending 160s-180/80s/90s for last 30 minutes. Orders to administer 10mg labetalol IV push and reassess BP in 15 minutes. Pt updated on POC. Pt understands.    1930 bedside report to RUFUS Sierra. Pt stable and alert, resting in bed with infant wrapped in blankets. Pt reports full sensation back in legs.

## 2018-02-28 NOTE — PROGRESS NOTES
S) Pt comfortable  O) vss  VE: c/c/-1  Fht's: 140's reactive  [place]: q2-3min  A/P Term IUP   - labor down, then start pushing

## 2018-02-28 NOTE — PROGRESS NOTES
edc  40.3 weeks gbs negative  2015: pt presents to labor and delivery, pt c/o uc's every 4-5 minutes that are strong.  Pt denies vaginal bleeding, leaking, and reports fetal movement.  sve 1-2/75/-2.  Unable to obtain continuous fetal heart tracing due to maternal adipose tissue.  Doppler fetal heart tones 140 bpm.  Pt's blood pressures are elevated.  Pt denies having high blood pressures in office.  Pt denies headaches or visual disturbances.  ble reflexes +2 with no clonus.  Mild amount of edema noted in the lower extremities.  Dr. Lucero called, report given.  Orders to monitor vital signs for 30 minutes and report back.

## 2018-02-28 NOTE — PROGRESS NOTES
0700- Bedside report received. Strip reviewed. Amnioinfusion started. Pt and FOB sleeping at bedside.   0830- Dr. Lee at bedside with DALIA Jones RN. SVE unchanged. Pitocin resumed by PAVITHRA Jones and position changed.   Late entry: Maternal care and strip review ongoing t/o shift in assistance with multiple staff members. Pt turned side to side, including with peanut ball. Report to DALIA Pace RN at 1300.

## 2018-02-28 NOTE — H&P
"Labor and Delivery History and Physical    CC: contractions    HPI: Mitzi Abel is a 16 yo  who presented to OBT at 40w3d with contractions. No bleeding or LOF. Positive FM. Blood pressures found to be in the severe range in triage. No HA, blurry vision or epigastric pain. She received her prenatal care with Dr Lee this pregnancy. It was complicated by morbid obesity and tobacco use in pregnancy.     All other systems were reviewed and were negative.    PMH:asthma as a child  PSH:none  OB HX:current  Gyn Hx:denies STI  SH: smokes 5-6 cigarettes daily, previous methamphetamine use, denies ETOH  Meds:PNV  Allergies:PCN, but can take other related families    /93   Pulse 93   Ht 1.626 m (5' 4\")   Wt (!) 146.5 kg (323 lb)   LMP 2017   BMI 55.44 kg/m²     Physical Exam  Gen: NAD  Abd: gravid, non tender  Ext: no edema    FHT:140's but discontinuous and difficult to interpret given maternal habitus  [place]:4-5 min  SVE:2/80/-2    Laboratory Evaluation  ABO: B pos  GBS neg  GTT:86  Rubella: Immune  HIV: Neg  RPR: NR  Hep sAg: neg    Assessment:   1. Term intrauterine pregnancy  2. Elevated blood pressures at term  3. Morbid obesity  4. Tobacco abuse in pregnancy    Plan: Discussed with patient and family the need for admission and augmentation of labor given her elevated blood pressures. Will obtain preeclampsia labs. She is currently asymptomatic. Discussed with patient that if her blood pressures are persistently severe range she may require anti hypertensive and magnesium therapy. Will augment with pitocin and consider early placement of internals to better characterize her FHT and labor. All questions answered.    Brent Lucero M.D.      "

## 2018-02-28 NOTE — PROGRESS NOTES
OB Progress Note    Patient reports increased pain with contractions. SVE 2/80/-2. FSE applied to scalp. Minimal fluid return noted. Will monitor.    Brent Lucero M.D.

## 2018-02-28 NOTE — PROGRESS NOTES
2130- Report received from Marya ARSHAD. POC discussed. Pt resting comfortably. IV started, labs drawn, admission profile completed.   2220- Dr Lucero updated on pt status. Orders received to begin pitocin.   2330- Dr Lucero at bedside. Arom with clear fluids. FSE placed.   0100- Pt requesting epidural. Dr Hicks called. Orders for full 100cc bolus. Dr Hicks called. Orders for full 100cc bolus. Epidural placed.   0130- Pt called out feeling pain with UCs. Bolus button pushed.   0200- Pt called out feeling pain with Ucs. Bolus button pushed.   0230- PT called out in tears. Dr Hicks called to bedside to evaluate epidural. Dr Hicks bolused epidura.  0245- Lobo MA RN at bedside. IUPC placed.   0300- Dr Hicks called to bedside to evaluate epidural. Epidural to be replaced.    0309- Dr Hicks at bedside. Epidural placed at 0316.  0345- Dr Lucero called and updated on pt status and reviewed strip.    0348- Pt reporting pain. Bolus button pushed.  0430- FSE discovered to have fallen off. FSE replaced. SVE at 3/80/-2.   0515- Pitocin started (see mar).   0543-  Dr Lucero updated on pt status. Reviewed strip.   0647- Pitocin shut off. Dr Lucero updated. Orders for a amnioinfusion. 500 bolus and 100cc maintenance until 1L in.   0700- Report given to ROMAINE Lomeli RN. POC discussed.

## 2018-02-28 NOTE — CARE PLAN
Problem: Pain  Goal: Alleviation of Pain or a reduction in pain to the patient's comfort goal    Intervention: Pain Management - Epidural/Spinal  Discussed pain management POC. Pt desired epidural later in labor process. Will notify RN when desired.       Problem: Risk for Infection, Impaired Wound Healing  Goal: Remain free from signs and symptoms of infection    Intervention: Use aseptic technique during vaginal examination  Discussed reasoning for limited SVE after AROM. Pt has increased BP, MD aware, Other VSS. Pt is free from s/s of infection at this time.

## 2018-02-28 NOTE — PROGRESS NOTES
"PN  S) pt without c/o, comfort with epidural  O) vss - bp stable  VE: 3/80/-2  Fht\"s 1440 reactive with variable to 90's and occassional late   TOCO: irregular, pit off  A/P Term IUP   - pit turned off prior secondary to fetal strip - cervix unchanged, will turn pitocin back on and if fetus doesn't tolerate will proceed with delivery by primary c/s  "

## 2018-03-01 LAB
ERYTHROCYTE [DISTWIDTH] IN BLOOD BY AUTOMATED COUNT: 40 FL (ref 35.9–50)
HCT VFR BLD AUTO: 33.1 % (ref 37–47)
HGB BLD-MCNC: 11 G/DL (ref 12–16)
MCH RBC QN AUTO: 28.3 PG (ref 27–33)
MCHC RBC AUTO-ENTMCNC: 33.2 G/DL (ref 33.6–35)
MCV RBC AUTO: 85.1 FL (ref 81.4–97.8)
PLATELET # BLD AUTO: 153 K/UL (ref 164–446)
PMV BLD AUTO: 11.3 FL (ref 9–12.9)
RBC # BLD AUTO: 3.89 M/UL (ref 4.2–5.4)
WBC # BLD AUTO: 10.7 K/UL (ref 4.8–10.8)

## 2018-03-01 PROCEDURE — 700102 HCHG RX REV CODE 250 W/ 637 OVERRIDE(OP): Performed by: OBSTETRICS & GYNECOLOGY

## 2018-03-01 PROCEDURE — 85027 COMPLETE CBC AUTOMATED: CPT

## 2018-03-01 PROCEDURE — 36415 COLL VENOUS BLD VENIPUNCTURE: CPT

## 2018-03-01 PROCEDURE — 770002 HCHG ROOM/CARE - OB PRIVATE (112)

## 2018-03-01 PROCEDURE — A9270 NON-COVERED ITEM OR SERVICE: HCPCS | Performed by: OBSTETRICS & GYNECOLOGY

## 2018-03-01 RX ORDER — OXYCODONE AND ACETAMINOPHEN 10; 325 MG/1; MG/1
1 TABLET ORAL EVERY 4 HOURS PRN
Status: DISCONTINUED | OUTPATIENT
Start: 2018-03-01 | End: 2018-03-02 | Stop reason: HOSPADM

## 2018-03-01 RX ORDER — IBUPROFEN 600 MG/1
600 TABLET ORAL EVERY 6 HOURS PRN
Status: DISCONTINUED | OUTPATIENT
Start: 2018-03-01 | End: 2018-03-02 | Stop reason: HOSPADM

## 2018-03-01 RX ORDER — OXYCODONE HYDROCHLORIDE AND ACETAMINOPHEN 5; 325 MG/1; MG/1
1 TABLET ORAL EVERY 4 HOURS PRN
Status: DISCONTINUED | OUTPATIENT
Start: 2018-03-01 | End: 2018-03-02 | Stop reason: HOSPADM

## 2018-03-01 RX ADMIN — IBUPROFEN 600 MG: 600 TABLET, FILM COATED ORAL at 17:29

## 2018-03-01 ASSESSMENT — PAIN SCALES - GENERAL
PAINLEVEL_OUTOF10: 4
PAINLEVEL_OUTOF10: 0
PAINLEVEL_OUTOF10: 2

## 2018-03-01 NOTE — DELIVERY NOTE
DATE OF SERVICE:  02/28/2018    I was called to evaluate the patient secondary to maternal exhaustion.  She   had been pushing for approximately 2-1/2 hours.  Fetus was in the   complete-complete, +3 presentation, left occiput anterior position.  I   discussed with the patient a vacuum delivery with risks of a subgaleal   hematoma and she accepted the risk.  We placed a Kiwi mushroom vacuum over the   fetal scalp.  One pop-off was noted.  We then had delivery over the next   contraction.  Vacuum suction cup was then removed.  The rest of the infant   delivered through uncomplicated.  Cord was clamped x2 and cut, and the infant   was handed over to nursing staff immediately secondary to moderate meconium.    Spontaneous vaginal delivery of an intact placenta with 3-vessel cord.  Fundus   was firm with Pitocin and massage.  No periurethral or perineal lacerations   were noted.  There was a single small scratch on the inside of the right   vaginal wall, which was repaired with a single suture of 4-0 Vicryl.  Fundus   was firm with Pitocin and massage.  Total estimated blood loss was   approximately 300 mL.  Apgars of the infant were 8 and 9.  Mother and infant   were both stable at the end of delivery.       ____________________________________     Corinne E. Capurro, MD CEC / NTS    DD:  02/28/2018 16:46:38  DT:  02/28/2018 17:15:24    D#:  [id number]  Job#:  [id number]

## 2018-03-01 NOTE — CARE PLAN
Problem: Altered physiologic condition related to immediate post-delivery state and potential for bleeding/hemorrhage  Goal: Patient physiologically stable as evidenced by normal lochia, palpable uterine involution and vital signs within normal limits  Outcome: PROGRESSING AS EXPECTED  Fundus firm. Lochia light. Vital signs WDL.     Problem: Alteration in comfort related to episiotomy, vaginal repair and/or after birth pains  Goal: Patient is able to ambulate, care for self and infant  Outcome: PROGRESSING AS EXPECTED  Pt able to ambulate, care for self and infant. Pt states her pain is controlled with prn pain medication. Pt's pain reassessed throughout this shift.

## 2018-03-01 NOTE — PROGRESS NOTES
Assessment done fundus firm, lochia light.  Vital signs stable.  Pt denies complaints of pain, see MAR.  Pt states voiding without difficulty.  FOB at bedside bonding with pt/baby.  Pt aware of dangers related to sleeping with infant, reviewed plan of care with pt & encouraged to call with needs.  Call light in place

## 2018-03-01 NOTE — PROGRESS NOTES
1930- Received report from PAVITHRA Palafox.  Assumed care.  Pt denies pain or needs at this time.  Report to Dr. Salazar via phone regarding BP, per MD, pt may be transferred to postpartum.  Parameters to call MD if systolic BP > or = 160 or diastolic BP > or = 105.  2000- Pt assisted up to bathroom.  Ambulates with a steady gait, denies nausea/dizziness.  Voided and otis care provided.    2015- Pt moved to S301 via wheelchair in stable condition with baby in arms.  Report to PAVITHRA Motta.

## 2018-03-01 NOTE — CONSULTS
"MOB denies needing assistance with breastfeeding at this time.  States infant is latching well onto the left breast, but is having difficulty latching onto the right breast.  Reports that infant just nursed an hour ago for > than 20 minutes.  Discussed the importance of providing adequate stimulation to both breasts to help protect and increase milk supply.  Requested that MOB call this RN when infant is ready to eat again so that assistance with latching infant onto right breast can be provided.  MOB verbalized understanding.    Assessed breasts of MOB and no signs of redness or tissue breakdown was observed at either breast.  MOB denies pain with latch.  Expressed colostrum easily from right breast. Breasts are wide spaced and have minimal breast tissue.  They are almost tubular in shape.    Encouraged MOB to do skin to skin with infant as much as possible.    Instructed MOB to feed infant every 2-3 hours or at least 8-12 times in a 24 hour period, but not to let infant go more than 3 hours without a feed.  Informed MOB that feedings should last at least 10-15 minutes total at both breasts combined.    Discussed potential effect of artificial nipple confusion to MOB's milk supply.  Encouraged MOB to refrain from introducing artificial nipples to infant for the first four weeks that breastfeeding is being established.    NICOLASA has WIC and is [facility] [place].  MOB is aware of the outpatient lactation assistance available to her through WIC and the [facility].      \"A New Beginning\" booklet given and content reviewed.    Encouraged to call for assistance as needed.  "

## 2018-03-01 NOTE — PROGRESS NOTES
PPD#1  S) pt without c/o  O) vss  Fundus: firm  Ext: no edema   Hgb: 11.0  A/P PPD#1, s/p vacuum assisted vaginal delivery  Stable, continue orders

## 2018-03-01 NOTE — PROGRESS NOTES
Radha RN brought pt from labor and delivery. ID bands and cuddles checked and verified with labor and delivery nurse, Radha. Patient oriented to room and surroundings. Skylight discussed. Bulb syring demonstration. Educated on emergency call light. ID bands and security issues discussed. Educated about the pink photo ID name badges. Educated about not sleeping with infant, no carrying infant in halls, and no leaving infant unattended. Assessment completed on patient. WDL. Discussed pain management. IV without redness and swelling. Family at bedside. Encourage pt to call for any needs.

## 2018-03-02 VITALS
HEIGHT: 64 IN | OXYGEN SATURATION: 94 % | WEIGHT: 293 LBS | TEMPERATURE: 97.7 F | HEART RATE: 94 BPM | RESPIRATION RATE: 20 BRPM | BODY MASS INDEX: 50.02 KG/M2 | DIASTOLIC BLOOD PRESSURE: 91 MMHG | SYSTOLIC BLOOD PRESSURE: 146 MMHG

## 2018-03-02 RX ORDER — IBUPROFEN 600 MG/1
600 TABLET ORAL EVERY 6 HOURS PRN
Qty: 30 TAB | Refills: 1 | Status: SHIPPED | OUTPATIENT
Start: 2018-03-02 | End: 2018-09-29

## 2018-03-02 ASSESSMENT — PAIN SCALES - GENERAL
PAINLEVEL_OUTOF10: 0

## 2018-03-02 ASSESSMENT — LIFESTYLE VARIABLES: EVER_SMOKED: YES

## 2018-03-02 NOTE — PROGRESS NOTES
Assessment complete. POC discussed and patient verbalized understanding. Reveiwed feeding frequency and length, skin to skin, and bundle wrapping infant. All questions answered. Will continue to assess.

## 2018-03-02 NOTE — DISCHARGE INSTRUCTIONS
POSTPARTUM DISCHARGE INSTRUCTIONS FOR MOM    YOB: 2000   Age: 17 y.o.               Admit Date: 2018     Discharge Date: 3/2/2018  Attending Doctor:  Corinne E Capurro, M.D.                  Allergies:  Penicillins    Discharged to home by car. Discharged via wheelchair, hospital escort: Yes.  Special equipment needed: Not Applicable  Belongings with: Personal  Be sure to schedule a follow-up appointment with your primary care doctor or any specialists as instructed.     Discharge Plan:   Diet Plan: Discussed  Activity Level: Discussed  Smoking Cessation Offered: Patient Counseled  Confirmed Follow up Appointment: Patient to Call and Schedule Appointment  Confirmed Symptoms Management: Discussed  Medication Reconciliation Updated: No (Comments)  Influenza Vaccine Indication: Indicated: 9 to 64 years of age  Influenza Vaccine Given - only chart on this line when given: Influenza Vaccine Given (See MAR)    REASONS TO CALL YOUR OBSTETRICIAN:  1.   Persistent fever or shaking chills (Temperature higher than 100.4)  2.   Heavy bleeding (soaking more than 1 pad per hour); Passing clots  3.   Foul odor from vagina  4.   Mastitis (Breast infection; breast pain, chills, fever, redness)  5.   Urinary pain, burning or frequency  6.   Episiotomy infection  7.   Abdominal incision infection  8.   Severe depression longer than 24 hours    HAND WASHING  · Prior to handling the baby.  · Before breastfeeding or bottle feeding baby.  · After using the bathroom or changing the baby's diaper.    WOUND CARE  Ask your physician for additional care instructions.  In general:    ·  Incision:      · Keep clean and dry.    · Do NOT lift anything heavier than your baby for up to 6 weeks.    · There should not be any opening or pus.      VAGINAL CARE  · Nothing inside vagina for 6 weeks: no sexual intercourse, tampons or douching.  · Bleeding may continue for 2-4 weeks.  Amount may vary.    · Call your physician for  "heavy bleeding which means soaking more than 1 pad per hour    BIRTH CONTROL  · It is possible to become pregnant at any time after delivery and while breastfeeding.  · Plan to discuss a method of birth control with your physician at your follow up visit. visit.    DIET AND ELIMINATION  · Eating more fiber (bran cereal, fruits, and vegetables) and drinking plenty of fluids will help to avoid constipation.  · Urinary frequency after childbirth is normal.    POSTPARTUM BLUES  During the first few days after birth, you may experience a sense of the \"blues\" which may include impatience, irritability or even crying.  These feeling come and go quickly.  However, as many as 1 in 10 women experience emotional symptoms known as postpartum depression.    Postpartum depression:  May start as early as the second or third day after delivery or take several weeks or months to develop.  Symptoms of \"blues\" are present, but are more intense:  Crying spells; loss of appetite; feelings of hopelessness or loss of control; fear of touching the baby; over concern or no concern at all about the baby; little or no concern about your own appearance/caring for yourself; and/or inability to sleep or excessive sleeping.  Contact your physician if you are experiencing any of these symptoms.    Crisis Hotline:  · [place] Crisis Hotline:  0-443-VQEWCLN  Or 1-519.469.3722  · [place] Crisis Hotline:  1-907.144.6517  Or 251-830-9465    PREVENTING SHAKEN BABY:  If you are angry or stressed, PUT THE BABY IN THE CRIB, step away, take some deep breaths, and wait until you are calm to care for the baby.  DO NOT SHAKE THE BABY.  You are not alone, call a supporter for help.    · Crisis [facility] Center 24/7 crisis line 604-836-7334 or 1-907.876.8368  · You can also text them, text \"ANSWER\" to 776937    QUIT SMOKING/TOBACCO USE:  I understand the use of any tobacco products increases my chance of suffering from future heart disease and could cause other " illnesses which may shorten my life. Quitting the use of tobacco products is the single most important thing I can do to improve my health. For further information on smoking / tobacco cessation call a Toll Free Quit Line at 1-668.268.7787 (*National [employer] [place]) or 1-399.852.4430 (American Lung Association) or you can access the web based program at www.lungusa.org.    · [place] Tobacco Users Help Line:  (609) 512-6788       Toll Free: 1-331.360.6861  · Quit Tobacco Program [facility] (706)923-2321    DEPRESSION / SUICIDE RISK:  As you are discharged from this [facility], it is important to learn how to keep safe from harming yourself.    Recognize the warning signs:  · Abrupt changes in personality, positive or negative- including increase in energy   · Giving away possessions  · Change in eating patterns- significant weight changes-  positive or negative  · Change in sleeping patterns- unable to sleep or sleeping all the time   · Unwillingness or inability to communicate  · Depression  · Unusual sadness, discouragement and loneliness  · Talk of wanting to die  · Neglect of personal appearance   · Rebelliousness- reckless behavior  · Withdrawal from people/activities they love  · Confusion- inability to concentrate     If you or a loved one observes any of these behaviors or has concerns about self-harm, here's what you can do:  · Talk about it- your feelings and reasons for harming yourself  · Remove any means that you might use to hurt yourself (examples: pills, rope, extension cords, firearm)  · Get professional help from the community (Mental Health, Substance Abuse, psychological counseling)  · Do not be alone:Call your Safe Contact- someone whom you trust who will be there for you.  · Call your local CRISIS HOTLINE 546-5414 or 126-719-1736  · Call your local Children's Mobile Crisis Response Team [place] (802) 589-1656 or www.[employer]  · Call the toll free  National Suicide Prevention Hotlines   · National Suicide Prevention Lifeline 397-381-JHUF (7763)  · National Hope Line Network 800-SUICIDE (253-4465)    DISCHARGE SURVEY:  Thank you for choosing [facility].  We hope we provided you with very good care.  You may be receiving a survey in the mail.  Please fill it out.  Your opinion is valuable to us.    ADDITIONAL EDUCATIONAL MATERIALS GIVEN TO PATIENT:        My signature on this form indicates that:  1.  I have reviewed and understand the above information  2.  My questions regarding this information have been answered to my satisfaction.  3.  I have formulated a plan with my discharge nurse to obtain my prescribed medication for home.

## 2018-03-02 NOTE — DISCHARGE SUMMARY
DATE OF ADMISSION:  02/27/2018    DATE OF DISCHARGE:  03/02/2018    ADMITTING DIAGNOSES:  1.  Intrauterine pregnancy at 40-3/7 weeks and early active labor.  2.  Pregnancy-induced hypertension.    DISCHARGE DIAGNOSES:  1.  Intrauterine pregnancy at 40-3/7 weeks and early active labor.  2.  Pregnancy-induced hypertension.  3.  Status post vacuum-assisted vaginal delivery.    HOSPITAL COURSE:  The patient was admitted and she underwent a vacuum-assisted   vaginal delivery, uncomplicated.  Her postpartum hemoglobin is 11.  She is   postpartum day #2 and stable for discharge home.  She declines having a   prescription written for Percocet.  I did write a prescription for Motrin.    She is instructed to follow up with my office in 6 weeks' time.       ____________________________________     Corinne E. Capurro, MD C[facility] / MAYO    DD:  03/02/2018 05:46:07  DT:  03/02/2018 08:07:02    D#:  [id number]  Job#:  [id number]

## 2018-03-02 NOTE — CARE PLAN
Problem: Altered physiologic condition related to immediate post-delivery state and potential for bleeding/hemorrhage  Goal: Patient physiologically stable as evidenced by normal lochia, palpable uterine involution and vital signs within normal limits  Outcome: PROGRESSING AS EXPECTED  Fundus firm, lochia light. Vital signs stable.     Problem: Alteration in comfort related to episiotomy, vaginal repair and/or after birth pains  Goal: Patient verbalizes acceptable pain level  Outcome: PROGRESSING AS EXPECTED  Patient will call to request pain medication as needed.

## 2018-03-02 NOTE — CARE PLAN
Problem: Potential knowledge deficit related to lack of understanding of self and  care  Goal: Patient will demonstrate ability to care for self and infant    Intervention: Assess patient and knowledge of self and infant care  Patient continues to deny prn pain medication. Observed infant latching. Encouraged deeper latch and demonstrated to mother. Fob in room for assistance. Patient cleared for discharge. Patient received fly and pnemo vaccine prior to discharge.

## 2018-03-02 NOTE — PROGRESS NOTES
Patient educated when to call md for post delivery hemmorage. Patient education to feed every 2-3 hours on demand. Patient educated to call for follow up appointment in 6  weeks at Obstetrician. Patient received discharge education and denies self care questions. Discussed plans for supplementing infant. Patient plans to give similac formula.

## 2018-03-02 NOTE — PROGRESS NOTES
MOB reports pain with latch with the last few feedings.  Denies tissue breakdown at breasts.  Unable to observe latch as infant was in the NBN.  Encouraged MOB to loosen infant's latch and reposition infant at breast when pain is continuous.    Offered to provide latch assistance prior to MOB being discharged.  MOB stated will call for assistance is needed.    MOB states will follow up with [facility] for outpatient lactation support.    Encouraged MOB to continue to feed on demand according to hunger cues and not to let infant go more than 3 hours without a feed.  Weight loss for infant since birth is 5.7% which is within defined limits.

## 2018-03-02 NOTE — PROGRESS NOTES
MOB called asking for help with painful latch. Observed infant latch onto left breast via football hold.  When taken off breast, nipple was compressed and flattened on both sides. Took infant off, then relatched infant with deeper latch. MOB noted to feel better, but  from previous latch.     Encouraged to call for assistance as needed.   NICOLASA has been using her hand pump from [facility]. She will follow up with [facility] for lactation assistance.

## 2018-05-07 ENCOUNTER — TELEPHONE (OUTPATIENT)
Dept: PEDIATRICS | Facility: CLINIC | Age: 18
End: 2018-05-07

## 2018-05-07 NOTE — TELEPHONE ENCOUNTER
Mother tested positive for post partum depression on EPDS in clinic today while being seen in clinic for daughter's 2 mo WCC

## 2018-06-25 ENCOUNTER — OFFICE VISIT (OUTPATIENT)
Dept: URGENT CARE | Facility: CLINIC | Age: 18
End: 2018-06-25
Payer: COMMERCIAL

## 2018-06-25 VITALS
RESPIRATION RATE: 18 BRPM | DIASTOLIC BLOOD PRESSURE: 60 MMHG | SYSTOLIC BLOOD PRESSURE: 110 MMHG | BODY MASS INDEX: 50.02 KG/M2 | WEIGHT: 293 LBS | HEART RATE: 113 BPM | OXYGEN SATURATION: 94 % | TEMPERATURE: 96.8 F | HEIGHT: 64 IN

## 2018-06-25 DIAGNOSIS — H65.01 RIGHT ACUTE SEROUS OTITIS MEDIA, RECURRENCE NOT SPECIFIED: ICD-10-CM

## 2018-06-25 DIAGNOSIS — J06.9 URI WITH COUGH AND CONGESTION: ICD-10-CM

## 2018-06-25 DIAGNOSIS — H10.33 ACUTE BACTERIAL CONJUNCTIVITIS OF BOTH EYES: ICD-10-CM

## 2018-06-25 DIAGNOSIS — J40 BRONCHITIS: Primary | ICD-10-CM

## 2018-06-25 PROCEDURE — 99214 OFFICE O/P EST MOD 30 MIN: CPT | Performed by: PHYSICIAN ASSISTANT

## 2018-06-25 RX ORDER — CLARITHROMYCIN 250 MG/1
250 TABLET, FILM COATED ORAL 2 TIMES DAILY
Qty: 14 TAB | Refills: 0 | Status: SHIPPED | OUTPATIENT
Start: 2018-06-25 | End: 2018-07-02

## 2018-06-25 RX ORDER — PROMETHAZINE HYDROCHLORIDE AND CODEINE PHOSPHATE 6.25; 1 MG/5ML; MG/5ML
5 SYRUP ORAL 4 TIMES DAILY PRN
Qty: 240 ML | Refills: 0 | Status: SHIPPED | OUTPATIENT
Start: 2018-06-25 | End: 2018-07-09

## 2018-06-25 RX ORDER — POLYMYXIN B SULFATE AND TRIMETHOPRIM 1; 10000 MG/ML; [USP'U]/ML
1 SOLUTION OPHTHALMIC EVERY 4 HOURS
Qty: 10 ML | Refills: 0 | Status: SHIPPED | OUTPATIENT
Start: 2018-06-25 | End: 2018-09-29

## 2018-06-25 RX ORDER — METHYLPREDNISOLONE 4 MG/1
4 TABLET ORAL DAILY
Qty: 21 TAB | Refills: 0 | Status: SHIPPED | OUTPATIENT
Start: 2018-06-25 | End: 2018-07-01

## 2018-06-25 NOTE — PATIENT INSTRUCTIONS
Acute Bronchitis, Adult  Acute bronchitis is when air tubes (bronchi) in the lungs suddenly get swollen. The condition can make it hard to breathe. It can also cause these symptoms:  · A cough.  · Coughing up clear, yellow, or green mucus.  · Wheezing.  · Chest congestion.  · Shortness of breath.  · A fever.  · Body aches.  · Chills.  · A sore throat.  Follow these instructions at home:  Medicines  · Take over-the-counter and prescription medicines only as told by your doctor.  · If you were prescribed an antibiotic medicine, take it as told by your doctor. Do not stop taking the antibiotic even if you start to feel better.  General instructions  · Rest.  · Drink enough fluids to keep your pee (urine) clear or pale yellow.  · Avoid smoking and secondhand smoke. If you smoke and you need help quitting, ask your doctor. Quitting will help your lungs heal faster.  · Use an inhaler, cool mist vaporizer, or humidifier as told by your doctor.  · Keep all follow-up visits as told by your doctor. This is important.  How is this prevented?  To lower your risk of getting this condition again:  · Wash your hands often with soap and water. If you cannot use soap and water, use hand .  · Avoid contact with people who have cold symptoms.  · Try not to touch your hands to your mouth, nose, or eyes.  · Make sure to get the flu shot every year.  Contact a doctor if:  · Your symptoms do not get better in 2 weeks.  Get help right away if:  · You cough up blood.  · You have chest pain.  · You have very bad shortness of breath.  · You become dehydrated.  · You faint (pass out) or keep feeling like you are going to pass out.  · You keep throwing up (vomiting).  · You have a very bad headache.  · Your fever or chills gets worse.  This information is not intended to replace advice given to you by your health care provider. Make sure you discuss any questions you have with your health care provider.  Document Released: 06/05/2009  Document Revised: 07/26/2017 Document Reviewed: 06/07/2017  [employer] Interactive Patient Education © 2017 [employer]

## 2018-06-26 NOTE — PROGRESS NOTES
Subjective:      Pt is a 17 y.o. female who presents with Cough (sore thraot, mucus, loss of voice, light headed x 4 days )            HPI  PT presents to  clinic today complaining of sore throat, red and watery eyes, pressure in ears, laryngitis, cough, fatigue, runny nose, wheezing and SOB. PT denies CP, NVD, abdominal pain, joint pain. PT states these symptoms began around 4 days ago. PT states the pain is a 7/10 with coughing fits, aching in nature and worse at night.  Pt has not taken any RX medications for this condition. The pt's medication list, problem list, and allergies have been evaluated and reviewed during today's visit.    PMH:  Past Medical History:   Diagnosis Date   • BMI, pediatric > 99% for age 10/15/2010   • Borderline personality disorder    • Eczema 10/15/2010   • Elevated blood pressure 10/15/2010   • H/O: Bell's palsy 08   • Insulin resistance 2011   • Major depressive disorder    • Myopia 10/15/2010   • Wheeze 10/15/2010       PSH:  Negative per pt.      Fam Hx:    family history includes Alcohol/Drug in her father; Cancer in her paternal grandfather; Diabetes in her mother and paternal grandfather; Genetic in her mother; Heart Disease in her paternal grandfather; Hypertension in her father and paternal grandfather; Psychiatry in her brother; Thyroid in her paternal grandmother.  Family Status   Relation Status   • Mother Alive   • Father Alive   • Sister Alive   • Paternal Grandmother Alive   • Paternal Grandfather    • Brother Alive       Soc HX:  Social History     Social History   • Marital status:      Spouse name: N/A   • Number of children: N/A   • Years of education: N/A     Occupational History   • Not on file.     Social History Main Topics   • Smoking status: Current Every Day Smoker     Packs/day: 0.25     Types: Cigarettes   • Smokeless tobacco: Never Used      Comment: 4 daily   • Alcohol use No   • Drug use: Yes     Types: Inhaled      Comment: Hx  of meth. last use may 2017   • Sexual activity: Yes     Partners: Male     Birth control/ protection: Condom      Comment: 1 month ago     Other Topics Concern   • Not on file     Social History Narrative   • No narrative on file         Medications:    Current Outpatient Prescriptions:   •  MethylPREDNISolone (MEDROL DOSEPAK) 4 MG Tablet Therapy Pack, Take 1 Tab by mouth every day for 6 days., Disp: 21 Tab, Rfl: 0  •  clarithromycin (BIAXIN) 250 MG Tab, Take 1 Tab by mouth 2 times a day for 7 days., Disp: 14 Tab, Rfl: 0  •  polymixin-trimethoprim (POLYTRIM) 32822-4.1 UNIT/ML-% Solution, Place 1 Drop in both eyes every 4 hours., Disp: 10 mL, Rfl: 0  •  promethazine-codeine (PHENERGAN-CODEINE) 6.25-10 MG/5ML Syrup, Take 5 mL by mouth 4 times a day as needed for up to 14 days., Disp: 240 mL, Rfl: 0  •  ibuprofen (MOTRIN) 600 MG Tab, Take 1 Tab by mouth every 6 hours as needed for Mild Pain or Moderate Pain., Disp: 30 Tab, Rfl: 1      Allergies:  Penicillins    ROS  Review of Systems   Constitutional: Positive for malaise/fatigue. Negative for fever and diaphoresis.   HENT: Positive for congestion, RIGHT ear pain and sore throat. Negative for ear discharge, hearing loss, nosebleeds and tinnitus.    Eyes: Negative for blurred vision, double vision and photophobia. +redness of both eyes  Respiratory: Positive for cough, sputum production, shortness of breath and wheezing. Negative for hemoptysis.    Cardiovascular: Negative for chest pain and palpitations.   Gastrointestinal: Negative for nausea, vomiting, abdominal pain, diarrhea and constipation.   Genitourinary: Negative for dysuria and flank pain.   Musculoskeletal: Negative for joint pain and myalgias.   Skin: Negative for itching and rash.   Neurological: Positive for headaches from coughing. Negative for dizziness, tingling and weakness.   Endo/Heme/Allergies: Does not bruise/bleed easily.   Psychiatric/Behavioral: Negative for depression. The patient is not  "nervous/anxious.             Objective:     /60   Pulse (!) 113   Temp 36 °C (96.8 °F)   Resp 18   Ht 1.626 m (5' 4\")   Wt (!) 147.4 kg (325 lb)   SpO2 94%   BMI 55.79 kg/m²      Physical Exam       Physical Exam   Constitutional: PT is oriented to person, place, and time. PT appears well-developed and well-nourished. No distress.   HENT:   Head: Normocephalic and atraumatic.   Right Ear: Hearing, external ear and ear canal normal. Tympanic membrane is erythematous and bulging. A middle ear effusion is present.   Left Ear: Hearing, tympanic membrane, external ear and ear canal normal.   Nose: Mucosal edema, rhinorrhea and sinus tenderness present. Right sinus exhibits frontal sinus tenderness. Left sinus exhibits frontal sinus tenderness.   Mouth/Throat: Uvula is midline. Mucous membranes are pale. Posterior oropharyngeal edema and posterior oropharyngeal erythema present. No oropharyngeal exudate.   Eyes: EOM are normal. Conjunctiva on left and right are injected. Pupils are equal, round, and reactive to light. Right eye exhibits discharge. Left eye exhibits discharge. No scleral icterus.   Neck: Normal range of motion. Neck supple. No thyromegaly present.   Cardiovascular: Normal rate, regular rhythm, normal heart sounds and intact distal pulses.  Exam reveals no gallop and no friction rub.    No murmur heard.  Pulmonary/Chest: Effort normal. No respiratory distress. PT has wheezes. PT has no rales. PT exhibits tenderness.   Abdominal: Soft. Bowel sounds are normal. PT exhibits no distension and no mass. There is no tenderness. There is no rebound and no guarding.   Musculoskeletal: Normal range of motion. PT exhibits no edema and no tenderness.   Lymphadenopathy:     PT has no cervical adenopathy.   Neurological: Pt is alert and oriented to person, place, and time. Pt has normal reflexes. No cranial nerve deficit.   Skin: Skin is warm and dry. No rash noted. No erythema.   Psychiatric: PT has a " normal mood and affect. Pt behavior is normal. Judgment and thought content normal.        Assessment/Plan:     1. Bronchitis    - MethylPREDNISolone (MEDROL DOSEPAK) 4 MG Tablet Therapy Pack; Take 1 Tab by mouth every day for 6 days.  Dispense: 21 Tab; Refill: 0  - clarithromycin (BIAXIN) 250 MG Tab; Take 1 Tab by mouth 2 times a day for 7 days.  Dispense: 14 Tab; Refill: 0    2. URI with cough and congestion    - MethylPREDNISolone (MEDROL DOSEPAK) 4 MG Tablet Therapy Pack; Take 1 Tab by mouth every day for 6 days.  Dispense: 21 Tab; Refill: 0  - promethazine-codeine (PHENERGAN-CODEINE) 6.25-10 MG/5ML Syrup; Take 5 mL by mouth 4 times a day as needed for up to 14 days.  Dispense: 240 mL; Refill: 0    3. Right acute serous otitis media, recurrence not specified    - MethylPREDNISolone (MEDROL DOSEPAK) 4 MG Tablet Therapy Pack; Take 1 Tab by mouth every day for 6 days.  Dispense: 21 Tab; Refill: 0  - clarithromycin (BIAXIN) 250 MG Tab; Take 1 Tab by mouth 2 times a day for 7 days.  Dispense: 14 Tab; Refill: 0    4. Acute bacterial conjunctivitis of both eyes    - MethylPREDNISolone (MEDROL DOSEPAK) 4 MG Tablet Therapy Pack; Take 1 Tab by mouth every day for 6 days.  Dispense: 21 Tab; Refill: 0  - polymixin-trimethoprim (POLYTRIM) 80477-5.1 UNIT/ML-% Solution; Place 1 Drop in both eyes every 4 hours.  Dispense: 10 mL; Refill: 0    Rest, fluids encouraged.  OTC decongestant for congestion/cough  Note given for work.  AVS with medical info given.  Pt was in full understanding and agreement with the plan.  Follow-up as needed if symptoms worsen or fail to improve.

## 2018-09-28 PROCEDURE — 99284 EMERGENCY DEPT VISIT MOD MDM: CPT

## 2018-09-29 ENCOUNTER — HOSPITAL ENCOUNTER (EMERGENCY)
Facility: MEDICAL CENTER | Age: 18
End: 2018-09-29
Attending: EMERGENCY MEDICINE
Payer: COMMERCIAL

## 2018-09-29 VITALS
RESPIRATION RATE: 18 BRPM | DIASTOLIC BLOOD PRESSURE: 78 MMHG | SYSTOLIC BLOOD PRESSURE: 142 MMHG | OXYGEN SATURATION: 91 % | HEIGHT: 64 IN | TEMPERATURE: 96.6 F | BODY MASS INDEX: 50.02 KG/M2 | WEIGHT: 293 LBS | HEART RATE: 81 BPM

## 2018-09-29 DIAGNOSIS — N93.9 VAGINAL BLEEDING: ICD-10-CM

## 2018-09-29 DIAGNOSIS — N93.8 DYSFUNCTIONAL UTERINE BLEEDING: ICD-10-CM

## 2018-09-29 LAB
ALBUMIN SERPL BCP-MCNC: 3.3 G/DL (ref 3.2–4.9)
ALBUMIN/GLOB SERPL: 1 G/DL
ALP SERPL-CCNC: 70 U/L (ref 30–99)
ALT SERPL-CCNC: 41 U/L (ref 2–50)
ANION GAP SERPL CALC-SCNC: 7 MMOL/L (ref 0–11.9)
APPEARANCE UR: CLEAR
APTT PPP: 26.3 SEC (ref 24.7–36)
AST SERPL-CCNC: 31 U/L (ref 12–45)
BACTERIA #/AREA URNS HPF: ABNORMAL /HPF
BASOPHILS # BLD AUTO: 0.4 % (ref 0–1.8)
BASOPHILS # BLD: 0.03 K/UL (ref 0–0.12)
BILIRUB SERPL-MCNC: 0.3 MG/DL (ref 0.1–1.5)
BILIRUB UR QL STRIP.AUTO: NEGATIVE
BUN SERPL-MCNC: 12 MG/DL (ref 8–22)
CALCIUM SERPL-MCNC: 9.2 MG/DL (ref 8.4–10.2)
CHLORIDE SERPL-SCNC: 107 MMOL/L (ref 96–112)
CO2 SERPL-SCNC: 22 MMOL/L (ref 20–33)
COLOR UR: YELLOW
CREAT SERPL-MCNC: 0.75 MG/DL (ref 0.5–1.4)
EOSINOPHIL # BLD AUTO: 0.13 K/UL (ref 0–0.51)
EOSINOPHIL NFR BLD: 1.9 % (ref 0–6.9)
EPI CELLS #/AREA URNS HPF: ABNORMAL /HPF
ERYTHROCYTE [DISTWIDTH] IN BLOOD BY AUTOMATED COUNT: 37.8 FL (ref 35.9–50)
GLOBULIN SER CALC-MCNC: 3.4 G/DL (ref 1.9–3.5)
GLUCOSE SERPL-MCNC: 130 MG/DL (ref 65–99)
GLUCOSE UR STRIP.AUTO-MCNC: NEGATIVE MG/DL
HCG UR QL: NEGATIVE
HCT VFR BLD AUTO: 38.9 % (ref 37–47)
HGB BLD-MCNC: 13 G/DL (ref 12–16)
IMM GRANULOCYTES # BLD AUTO: 0.01 K/UL (ref 0–0.11)
IMM GRANULOCYTES NFR BLD AUTO: 0.1 % (ref 0–0.9)
INR PPP: 0.96 (ref 0.87–1.13)
KETONES UR STRIP.AUTO-MCNC: NEGATIVE MG/DL
LEUKOCYTE ESTERASE UR QL STRIP.AUTO: NEGATIVE
LYMPHOCYTES # BLD AUTO: 2.58 K/UL (ref 1–4.8)
LYMPHOCYTES NFR BLD: 37.7 % (ref 22–41)
MCH RBC QN AUTO: 28.3 PG (ref 27–33)
MCHC RBC AUTO-ENTMCNC: 33.4 G/DL (ref 33.6–35)
MCV RBC AUTO: 84.7 FL (ref 81.4–97.8)
MICRO URNS: ABNORMAL
MONOCYTES # BLD AUTO: 0.44 K/UL (ref 0–0.85)
MONOCYTES NFR BLD AUTO: 6.4 % (ref 0–13.4)
MUCOUS THREADS #/AREA URNS HPF: ABNORMAL /HPF
NEUTROPHILS # BLD AUTO: 3.66 K/UL (ref 2–7.15)
NEUTROPHILS NFR BLD: 53.5 % (ref 44–72)
NITRITE UR QL STRIP.AUTO: NEGATIVE
NRBC # BLD AUTO: 0 K/UL
NRBC BLD-RTO: 0 /100 WBC
PH UR STRIP.AUTO: 6 [PH]
PLATELET # BLD AUTO: 219 K/UL (ref 164–446)
PMV BLD AUTO: 10.5 FL (ref 9–12.9)
POTASSIUM SERPL-SCNC: 3.8 MMOL/L (ref 3.6–5.5)
PROT SERPL-MCNC: 6.7 G/DL (ref 6–8.2)
PROT UR QL STRIP: NEGATIVE MG/DL
PROTHROMBIN TIME: 12.7 SEC (ref 12–14.6)
RBC # BLD AUTO: 4.59 M/UL (ref 4.2–5.4)
RBC # URNS HPF: ABNORMAL /HPF
RBC UR QL AUTO: ABNORMAL
SODIUM SERPL-SCNC: 136 MMOL/L (ref 135–145)
SP GR UR REFRACTOMETRY: 1.03
WBC # BLD AUTO: 6.9 K/UL (ref 4.8–10.8)
WBC #/AREA URNS HPF: ABNORMAL /HPF

## 2018-09-29 PROCEDURE — 36415 COLL VENOUS BLD VENIPUNCTURE: CPT

## 2018-09-29 PROCEDURE — 85025 COMPLETE CBC W/AUTO DIFF WBC: CPT

## 2018-09-29 PROCEDURE — 81025 URINE PREGNANCY TEST: CPT

## 2018-09-29 PROCEDURE — 85730 THROMBOPLASTIN TIME PARTIAL: CPT

## 2018-09-29 PROCEDURE — 700102 HCHG RX REV CODE 250 W/ 637 OVERRIDE(OP): Performed by: EMERGENCY MEDICINE

## 2018-09-29 PROCEDURE — 81001 URINALYSIS AUTO W/SCOPE: CPT

## 2018-09-29 PROCEDURE — A9270 NON-COVERED ITEM OR SERVICE: HCPCS | Performed by: EMERGENCY MEDICINE

## 2018-09-29 PROCEDURE — 80053 COMPREHEN METABOLIC PANEL: CPT

## 2018-09-29 PROCEDURE — 85610 PROTHROMBIN TIME: CPT

## 2018-09-29 RX ORDER — IBUPROFEN 600 MG/1
600 TABLET ORAL ONCE
Status: COMPLETED | OUTPATIENT
Start: 2018-09-29 | End: 2018-09-29

## 2018-09-29 RX ADMIN — IBUPROFEN 600 MG: 600 TABLET ORAL at 02:04

## 2018-09-29 ASSESSMENT — ENCOUNTER SYMPTOMS
VOMITING: 0
SORE THROAT: 0
FLANK PAIN: 0
FEVER: 0
HEADACHES: 0
SHORTNESS OF BREATH: 0
DIZZINESS: 0
CHILLS: 0
ABDOMINAL PAIN: 0

## 2018-09-29 ASSESSMENT — PAIN SCALES - GENERAL
PAINLEVEL_OUTOF10: 4
PAINLEVEL_OUTOF10: 2

## 2018-09-29 NOTE — DISCHARGE INSTRUCTIONS
Start taking motrin 600mg by mouth every 8 hours with food. If you continue to have bleeding and feeling short of breath, chest pain or other serious concerns come back to the ER.  Contact your OBGYN doctor for follow up.

## 2018-09-29 NOTE — ED PROVIDER NOTES
ED Provider Note     9/29/2018  2:23 AM    Means of Arrival: Walk In  History obtained by: patient  Limitations:     CHIEF COMPLAINT  Chief Complaint   Patient presents with   • Vaginal Bleeding       HPI  Mitzi Abel is a 17 y.o. female who presents with concerns of vaginal bleeding for 2 days. She says that this is her second menstrual cycle since giving birth.  Mild pelvic cramping that does not radiate.  She says over the past several hours she has been soaking a pad every hour.  She says she is feeling more tired and fatigued. No shortness of breath or chest pain.     REVIEW OF SYSTEMS  Review of Systems   Constitutional: Negative for chills and fever.   HENT: Negative for sore throat.    Respiratory: Negative for shortness of breath.    Cardiovascular: Negative for chest pain.   Gastrointestinal: Negative for abdominal pain and vomiting.   Genitourinary: Negative for dysuria, flank pain and urgency.        No vaginal discharge besides bleeding   Skin: Negative for rash.   Neurological: Negative for dizziness and headaches.   All other systems reviewed and are negative.    See HPI for further details.    PAST MEDICAL HISTORY   has a past medical history of BMI, pediatric > 99% for age (10/15/2010); Borderline personality disorder; Eczema (10/15/2010); Elevated blood pressure (10/15/2010); H/O: Bell's palsy (12/26/08); Insulin resistance (12/7/2011); Major depressive disorder; Myopia (10/15/2010); and Wheeze (10/15/2010).    SOCIAL HISTORY  Social History     Social History Main Topics   • Smoking status: Current Every Day Smoker     Packs/day: 0.25     Types: Cigarettes   • Smokeless tobacco: Never Used      Comment: 4 daily   • Alcohol use No   • Drug use: Yes     Types: Inhaled      Comment: Hx of meth. last use may 2017   • Sexual activity: Yes     Partners: Male     Birth control/ protection: Condom      Comment: 1 month ago       SURGICAL HISTORY  patient denies any surgical history    CURRENT  "MEDICATIONS  Home Medications     Reviewed by Sandhya North R.N. (Registered Nurse) on 09/29/18 at 0007  Med List Status: Complete   Medication Last Dose Status        Patient Uli Taking any Medications                       ALLERGIES  Allergies   Allergen Reactions   • Penicillins        PHYSICAL EXAM  VITAL SIGNS: /78   Pulse 81   Temp 35.9 °C (96.6 °F)   Resp 18   Ht 1.626 m (5' 4\")   Wt (!) 151 kg (332 lb 14.3 oz)   LMP 09/29/2018   SpO2 91%   Breastfeeding? No   BMI 57.14 kg/m²     Pulse ox interpretation: I interpret this pulse ox as normal.  Constitutional: Alert in no apparent distress.  HENT: No signs of trauma, Bilateral external ears normal, Nose normal.   Eyes: Pupils are equal, Conjunctiva appear pale. Non-icteric.   Neck: Normal range of motion, No tenderness, Supple, No stridor.   Cardiovascular: Regular rate and rhythm, no murmurs. Symmetric distal pulses. No cyanosis of extremities. No peripheral edema of extremities.  Thorax & Lungs: Normal breath sounds, No respiratory distress, No wheezing, No chest tenderness.   Abdomen: Bowel sounds normal, Soft, No tenderness, No masses, No pulsatile masses. No peritoneal signs.  Defer  exam.   Skin: Warm, Dry, No erythema, No rash.   Back: No midline bony tenderness, No CVA tenderness.   Musculoskeletal: Good range of motion in all major joints. No tenderness to palpation or major deformities noted.   Neurologic: Alert , Normal motor function, Normal sensory function, No focal deficits noted.   Psychiatric: Affect normal, Judgment normal, Mood normal.   Physical Exam      DIAGNOSTIC STUDIES / PROCEDURES    LABS  Pertinent Labs & Imaging studies reviewed. (See chart for details)    RADIOLOGY  Pertinent Labs & Imaging studies reviewed. (See chart for details)    COURSE & MEDICAL DECISION MAKING  Pertinent Labs & Imaging studies reviewed. (See chart for details)    01:15 AM This is an emergent evaluation of a  17 y.o. female who presents " with vaginal bleeding for the past 48 hours..  She has no tachycardia.  Blood pressure is normal.  Conjunctivae is pale.  She otherwise has no pallor and her extremities are warm and well-perfused.  Workup will include screening for anemia, pregnancy, electrolyte abnormalities, urinary tract infection.   She will be given Motrin for vaginal bleeding.    2:24 AM  H/H normal. Pregnancy negative. No signs of infection. Vitals normal. She has deferred vaginal exam which I think is reasonable because symptoms most consistent with heavy menstrual bleeding.  I asked her to return if she starts feeling short of breath, increasing fatigue and if bleeding continues to be soaking pads at least 1 every hour for the next 24-48 hours.  I have asked her to start taking Motrin scheduled at least 3 times a day.  She has an OB/GYN doctor, she cannot remember their name, she will follow-up with them.      The patient will return for worsening symptoms and is stable at the time of discharge. The patient verbalizes understanding.        FINAL IMPRESSION    ICD-10-CM   1. Vaginal bleedingActive N93.9   2. Dysfunctional uterine bleedingActive N93.8            This dictation was created using voice recognition software. The accuracy of the dictation is limited to the abilities of the software. I expect there may be some errors of grammar and possibly content. The nursing notes were reviewed and certain aspects of this information were incorporated into this note.    Electronically signed by: Acosta Kenny II, 9/29/2018 2:23 AM

## 2018-09-29 NOTE — ED TRIAGE NOTES
Patient reports vaginal bleeding for the past three days.  States she has been bleeding through a super tampon and large pad every hour.

## 2018-09-29 NOTE — ED NOTES
Patient resting comfortably at this time.  Supplies at bedside for pelvic exam.  Will continue to monitor.

## 2018-09-29 NOTE — ED NOTES
Discharge instructions provided.  Pt verbalized understanding.  Pt ambulated from the ED with no incidents reported.

## 2018-10-16 ENCOUNTER — OFFICE VISIT (OUTPATIENT)
Dept: URGENT CARE | Facility: CLINIC | Age: 18
End: 2018-10-16
Payer: COMMERCIAL

## 2018-10-16 VITALS
TEMPERATURE: 96.9 F | RESPIRATION RATE: 18 BRPM | HEART RATE: 86 BPM | SYSTOLIC BLOOD PRESSURE: 118 MMHG | OXYGEN SATURATION: 94 % | BODY MASS INDEX: 50.02 KG/M2 | WEIGHT: 293 LBS | HEIGHT: 64 IN | DIASTOLIC BLOOD PRESSURE: 80 MMHG

## 2018-10-16 DIAGNOSIS — H66.001 ACUTE SUPPURATIVE OTITIS MEDIA OF RIGHT EAR WITHOUT SPONTANEOUS RUPTURE OF TYMPANIC MEMBRANE, RECURRENCE NOT SPECIFIED: ICD-10-CM

## 2018-10-16 PROCEDURE — 99214 OFFICE O/P EST MOD 30 MIN: CPT | Performed by: PHYSICIAN ASSISTANT

## 2018-10-16 RX ORDER — AZITHROMYCIN 250 MG/1
TABLET, FILM COATED ORAL
Qty: 6 TAB | Refills: 0 | Status: SHIPPED | OUTPATIENT
Start: 2018-10-16 | End: 2019-03-15

## 2018-10-16 ASSESSMENT — ENCOUNTER SYMPTOMS
HEADACHES: 1
DIARRHEA: 0
SWOLLEN GLANDS: 1
SPUTUM PRODUCTION: 0
SINUS PAIN: 1
MUSCULOSKELETAL NEGATIVE: 1
COUGH: 1
SHORTNESS OF BREATH: 0
VOMITING: 0
CHILLS: 0
CARDIOVASCULAR NEGATIVE: 1
HOARSE VOICE: 1
FEVER: 0
TROUBLE SWALLOWING: 1
DIZZINESS: 0
SORE THROAT: 1
WHEEZING: 0
ABDOMINAL PAIN: 0

## 2018-10-16 NOTE — PROGRESS NOTES
Subjective:      Mitzi Abel is a 17 y.o. female who presents with Pharyngitis (ear pain, headache, cough, x 3 days )            Pharyngitis    This is a new problem. The current episode started in the past 7 days. The problem has been gradually worsening. There has been no fever. The fever has been present for less than 1 day. Associated symptoms include congestion, coughing, ear pain, headaches, a hoarse voice, swollen glands and trouble swallowing. Pertinent negatives include no abdominal pain, diarrhea, shortness of breath or vomiting. She has had no exposure to strep. She has tried nothing for the symptoms. The treatment provided no relief.       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  MEDS: No current outpatient prescriptions on file.  ALLERGIES:   Allergies   Allergen Reactions   • Penicillins      SURGHX: History reviewed. No pertinent surgical history.  SOCHX:  reports that she has been smoking Cigarettes.  She has been smoking about 0.25 packs per day. She has never used smokeless tobacco. She reports that she uses drugs, including Inhaled. She reports that she does not drink alcohol.  FH: family history includes Alcohol/Drug in her father; Cancer in her paternal grandfather; Diabetes in her mother and paternal grandfather; Genetic in her mother; Heart Disease in her paternal grandfather; Hypertension in her father and paternal grandfather; Psychiatry in her brother; Thyroid in her paternal grandmother.    Review of Systems   Constitutional: Positive for malaise/fatigue. Negative for chills and fever.   HENT: Positive for congestion, ear pain, hoarse voice, sinus pain, sore throat and trouble swallowing.    Respiratory:  "Positive for cough. Negative for sputum production, shortness of breath and wheezing.    Cardiovascular: Negative.    Gastrointestinal: Negative for abdominal pain, diarrhea and vomiting.   Musculoskeletal: Negative.    Neurological: Positive for headaches. Negative for dizziness.       Medications, Allergies, and current problem list reviewed today in Epic     Objective:     /80   Pulse 86   Temp 36.1 °C (96.9 °F)   Resp 18   Ht 1.626 m (5' 4\")   Wt (!) 150.6 kg (332 lb)   LMP 09/29/2018   SpO2 94%   BMI 56.99 kg/m²      Physical Exam   Constitutional: She is oriented to person, place, and time. She appears well-developed and well-nourished. No distress.   HENT:   Head: Normocephalic and atraumatic.   Right Ear: Hearing and external ear normal. No mastoid tenderness. Tympanic membrane is erythematous and bulging. Tympanic membrane is not injected.   Left Ear: Hearing, tympanic membrane and external ear normal. No mastoid tenderness. Tympanic membrane is not injected, not erythematous and not bulging.   Nose: Nose normal.   Mouth/Throat: Oropharynx is clear and moist. No oropharyngeal exudate.   Eyes: Conjunctivae are normal. Left eye exhibits no discharge.   Neck: Normal range of motion. Neck supple.   Cardiovascular: Normal rate, regular rhythm and normal heart sounds.    Pulmonary/Chest: Effort normal and breath sounds normal. No respiratory distress. She has no wheezes. She has no rales.   Lymphadenopathy:     She has cervical adenopathy.   Neurological: She is alert and oriented to person, place, and time.   Skin: Skin is warm and dry. She is not diaphoretic.   Psychiatric: She has a normal mood and affect. Her behavior is normal. Judgment and thought content normal.   Nursing note and vitals reviewed.              Assessment/Plan:     1. Acute suppurative otitis media of right ear without spontaneous rupture of tympanic membrane, recurrence not specified  azithromycin (ZITHROMAX) 250 MG Tab "     OTC meds and conservative measures as discussed  Return to clinic or go to ED if symptoms worsen or persist. Indications for ED discussed at length. Patient voices understanding. Follow-up with your primary care provider in 3-5 days. Red flags discussed. All side effects of medication discussed including allergic response, GI upset, tendon injury, etc.    Please note that this dictation was created using voice recognition software. I have made every reasonable attempt to correct obvious errors, but I expect that there are errors of grammar and possibly content that I did not discover before finalizing the note.

## 2018-10-16 NOTE — LETTER
October 16, 2018         Patient: Mitzi Abel   YOB: 2000   Date of Visit: 10/16/2018           To Whom it May Concern:    Mitzi Abel was seen in my clinic on 10/16/2018. Please excuse any absences this week.    If you have any questions or concerns, please don't hesitate to call.        Sincerely,           Ponce Leone P.A.-C.  Electronically Signed

## 2018-10-27 ENCOUNTER — HOSPITAL ENCOUNTER (EMERGENCY)
Dept: HOSPITAL 8 - ED | Age: 18
Discharge: HOME | End: 2018-10-27
Payer: SELF-PAY

## 2018-10-27 VITALS — DIASTOLIC BLOOD PRESSURE: 74 MMHG | SYSTOLIC BLOOD PRESSURE: 135 MMHG

## 2018-10-27 VITALS — HEIGHT: 64 IN | WEIGHT: 293 LBS | BODY MASS INDEX: 50.02 KG/M2

## 2018-10-27 DIAGNOSIS — G89.11: Primary | ICD-10-CM
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
## 2018-10-27 DIAGNOSIS — F17.200: ICD-10-CM
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
## 2018-10-27 DIAGNOSIS — R10.13: ICD-10-CM
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
## 2018-10-27 LAB
ALBUMIN SERPL-MCNC: 3.2 G/DL (ref 3.4–5)
ALP SERPL-CCNC: 89 U/L (ref 45–117)
ALT SERPL-CCNC: 43 U/L (ref 12–78)
ANION GAP SERPL CALC-SCNC: 9 MMOL/L (ref 5–15)
BASOPHILS # BLD AUTO: 0.07 X10^3/UL (ref 0–0.3)
BASOPHILS NFR BLD AUTO: 1 % (ref 0–1)
BILIRUB SERPL-MCNC: 0.3 MG/DL (ref 0.2–1)
CALCIUM SERPL-MCNC: 9 MG/DL (ref 8.5–10.1)
CHLORIDE SERPL-SCNC: 111 MMOL/L (ref 98–107)
CREAT SERPL-MCNC: 0.9 MG/DL (ref 0.55–1.02)
EOSINOPHIL # BLD AUTO: 0.1 X10^3/UL (ref 0–0.8)
EOSINOPHIL NFR BLD AUTO: 1 % (ref 1–7)
ERYTHROCYTE [DISTWIDTH] IN BLOOD BY AUTOMATED COUNT: 12.9 % (ref 9.6–15.2)
HCG UR SG: 1.03 (ref 1–1.03)
LYMPHOCYTES # BLD AUTO: 1.97 X10^3/UL (ref 1–6.1)
LYMPHOCYTES NFR BLD AUTO: 23 % (ref 22–44)
MCH RBC QN AUTO: 27.9 PG (ref 27–34.8)
MCHC RBC AUTO-ENTMCNC: 33.4 G/DL (ref 32.4–35.8)
MCV RBC AUTO: 83.7 FL (ref 80–100)
MD: NO
MONOCYTES # BLD AUTO: 0.63 X10^3/UL (ref 0–1.4)
MONOCYTES NFR BLD AUTO: 7 % (ref 2–9)
NEUTROPHILS # BLD AUTO: 5.88 X10^3/UL (ref 1.8–8)
NEUTROPHILS NFR BLD AUTO: 68 % (ref 42–75)
PLATELET # BLD AUTO: 224 X10^3/UL (ref 130–400)
PMV BLD AUTO: 9.1 FL (ref 7.4–10.4)
PROT SERPL-MCNC: 7.5 G/DL (ref 6.4–8.2)
RBC # BLD AUTO: 4.76 X10^6/UL (ref 3.82–5.3)

## 2018-10-27 PROCEDURE — 99285 EMERGENCY DEPT VISIT HI MDM: CPT

## 2018-10-27 PROCEDURE — 71045 X-RAY EXAM CHEST 1 VIEW: CPT

## 2018-10-27 PROCEDURE — 36415 COLL VENOUS BLD VENIPUNCTURE: CPT

## 2018-10-27 PROCEDURE — 85025 COMPLETE CBC W/AUTO DIFF WBC: CPT

## 2018-10-27 PROCEDURE — 80053 COMPREHEN METABOLIC PANEL: CPT

## 2018-10-27 PROCEDURE — 81025 URINE PREGNANCY TEST: CPT

## 2018-10-27 PROCEDURE — 74177 CT ABD & PELVIS W/CONTRAST: CPT

## 2019-01-21 ENCOUNTER — OFFICE VISIT (OUTPATIENT)
Dept: URGENT CARE | Facility: CLINIC | Age: 19
End: 2019-01-21
Payer: COMMERCIAL

## 2019-01-21 VITALS
TEMPERATURE: 98.6 F | RESPIRATION RATE: 18 BRPM | BODY MASS INDEX: 50.02 KG/M2 | OXYGEN SATURATION: 96 % | HEIGHT: 64 IN | SYSTOLIC BLOOD PRESSURE: 136 MMHG | HEART RATE: 115 BPM | DIASTOLIC BLOOD PRESSURE: 90 MMHG | WEIGHT: 293 LBS

## 2019-01-21 DIAGNOSIS — J45.21 MILD INTERMITTENT REACTIVE AIRWAY DISEASE WITH ACUTE EXACERBATION: ICD-10-CM

## 2019-01-21 DIAGNOSIS — R68.89 FLU-LIKE SYMPTOMS: ICD-10-CM

## 2019-01-21 LAB
FLUAV+FLUBV AG SPEC QL IA: NEGATIVE
INT CON NEG: NORMAL
INT CON POS: NORMAL

## 2019-01-21 PROCEDURE — 94640 AIRWAY INHALATION TREATMENT: CPT | Performed by: EMERGENCY MEDICINE

## 2019-01-21 PROCEDURE — 99214 OFFICE O/P EST MOD 30 MIN: CPT | Mod: 25 | Performed by: EMERGENCY MEDICINE

## 2019-01-21 PROCEDURE — 87804 INFLUENZA ASSAY W/OPTIC: CPT | Performed by: EMERGENCY MEDICINE

## 2019-01-21 RX ORDER — ALBUTEROL SULFATE 2.5 MG/3ML
2.5 SOLUTION RESPIRATORY (INHALATION) ONCE
Status: COMPLETED | OUTPATIENT
Start: 2019-01-21 | End: 2019-01-21

## 2019-01-21 RX ORDER — PREDNISONE 20 MG/1
40 TABLET ORAL DAILY
Qty: 10 TAB | Refills: 0 | Status: SHIPPED | OUTPATIENT
Start: 2019-01-21 | End: 2019-03-15

## 2019-01-21 RX ORDER — OSELTAMIVIR PHOSPHATE 75 MG/1
75 CAPSULE ORAL 2 TIMES DAILY
Qty: 10 CAP | Refills: 0 | Status: SHIPPED | OUTPATIENT
Start: 2019-01-21 | End: 2019-03-15

## 2019-01-21 RX ADMIN — ALBUTEROL SULFATE 2.5 MG: 2.5 SOLUTION RESPIRATORY (INHALATION) at 17:43

## 2019-01-21 ASSESSMENT — ENCOUNTER SYMPTOMS
SENSORY CHANGE: 0
NAUSEA: 0
HEADACHES: 1
WHEEZING: 1
CHANGE IN BOWEL HABIT: 0
SHORTNESS OF BREATH: 1
SPUTUM PRODUCTION: 0
COUGH: 1
HEMOPTYSIS: 0
MYALGIAS: 1
ANOREXIA: 0
VOMITING: 1
FEVER: 1
DIARRHEA: 0
ABDOMINAL PAIN: 0
CHILLS: 1
FATIGUE: 1
SORE THROAT: 1
ROS GI COMMENTS: NOTES GAGGING, VOMITING ASSOCIATED WITH COUGHING EPISODES.
FOCAL WEAKNESS: 0

## 2019-01-21 NOTE — LETTER
January 21, 2019       Patient: Mitzi Abel   YOB: 2000   Date of Visit: 1/21/2019         To Whom It May Concern:    It is my medical opinion that Mitzi Abel should not attend work January 21-23, 2019.      Sincerely,          Luis Mac M.D.  Electronically Signed

## 2019-01-22 NOTE — PROGRESS NOTES
Subjective:      Mitzi Abel is a 18 y.o. female who presents with Cough (x2 days, cough, SOB, chest congestion, earaches, bodyaches. needs note work)            Influenza   This is a new problem. Episode onset: 2 days. The problem has been rapidly worsening. Associated symptoms include chills, congestion, coughing, fatigue, a fever, headaches, myalgias, a sore throat and vomiting. Pertinent negatives include no abdominal pain, anorexia, change in bowel habit, chest pain, nausea or rash. The symptoms are aggravated by coughing.   PMH RAD    Review of Systems   Constitutional: Positive for chills, fatigue, fever and malaise/fatigue.   HENT: Positive for congestion, ear pain and sore throat. Negative for hearing loss and nosebleeds.    Respiratory: Positive for cough, shortness of breath and wheezing. Negative for hemoptysis and sputum production.    Cardiovascular: Negative for chest pain.   Gastrointestinal: Positive for vomiting. Negative for abdominal pain, anorexia, change in bowel habit, diarrhea and nausea.        Notes gagging, vomiting associated with coughing episodes.   Musculoskeletal: Positive for myalgias.   Skin: Negative for rash.   Neurological: Positive for headaches. Negative for sensory change and focal weakness.   Endo/Heme/Allergies: Positive for environmental allergies.     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  MEDS:   Current Outpatient Prescriptions:   •  azithromycin (ZITHROMAX) 250 MG Tab, Z-irlanda, Use as directed., Disp: 6 Tab, Rfl: 0  ALLERGIES:   Allergies   Allergen Reactions   • Penicillins      SURGHX: History reviewed. No pertinent surgical history.  SOCHX:  reports that she has been smoking Cigarettes.   "She has been smoking about 0.25 packs per day. She has never used smokeless tobacco. She reports that she uses drugs, including Inhaled. She reports that she does not drink alcohol.  FH: family history includes Alcohol/Drug in her father; Cancer in her paternal grandfather; Diabetes in her mother and paternal grandfather; Genetic in her mother; Heart Disease in her paternal grandfather; Hypertension in her father and paternal grandfather; Psychiatry in her brother; Thyroid in her paternal grandmother.       Objective:     /90   Pulse (!) 115   Temp 37 °C (98.6 °F) (Temporal)   Resp 18   Ht 1.626 m (5' 4\")   Wt (!) 150.6 kg (332 lb)   SpO2 96%   BMI 56.99 kg/m²      Physical Exam   Constitutional: She is oriented to person, place, and time. She appears well-developed and well-nourished. She is cooperative.  Non-toxic appearance. She does not have a sickly appearance. No distress.   HENT:   Head: Normocephalic.   Right Ear: Tympanic membrane and ear canal normal.   Left Ear: Tympanic membrane and ear canal normal.   Nose: Mucosal edema and rhinorrhea present.   Mouth/Throat: Mucous membranes are normal. No trismus in the jaw. No uvula swelling. Posterior oropharyngeal erythema present. No oropharyngeal exudate or posterior oropharyngeal edema.   Eyes: Conjunctivae and lids are normal.   Neck: Trachea normal and phonation normal. Neck supple.   Cardiovascular: Regular rhythm and normal heart sounds.  Tachycardia present.    No murmur heard.  Pulmonary/Chest: Effort normal. She has no decreased breath sounds. She has no wheezes. She has no rhonchi. She has no rales.   Lymphadenopathy:     She has no cervical adenopathy.   Neurological: She is alert and oriented to person, place, and time.   Skin: Skin is warm and dry.   Psychiatric: She has a normal mood and affect.               Assessment/Plan:     1. Flu-like symptoms  Recommended supportive care measures, including rest, increasing oral fluid intake and " use of over-the-counter medications for relief of symptoms.  negative- POCT Influenza A/B  Rx Tamiflu if symptoms persist tomorrow.  2. Mild intermittent reactive airway disease with acute exacerbation  Rx ProAir, QVAR, prednisone  F/U PCP

## 2019-01-22 NOTE — PATIENT INSTRUCTIONS
"Influenza, Adult  Influenza (“the flu\") is an infection in the lungs, nose, and throat (respiratory tract). It is caused by a virus. The flu causes many common cold symptoms, as well as a high fever and body aches. It can make you feel very sick.  The flu spreads easily from person to person (is contagious). Getting a flu shot (influenza vaccination) every year is the best way to prevent the flu.  Follow these instructions at home:  · Take over-the-counter and prescription medicines only as told by your doctor.  · Use a cool mist humidifier to add moisture (humidity) to the air in your home. This can make it easier to breathe.  · Rest as needed.  · Drink enough fluid to keep your pee (urine) clear or pale yellow.  · Cover your mouth and nose when you cough or sneeze.  · Wash your hands with soap and water often, especially after you cough or sneeze. If you cannot use soap and water, use hand .  · Stay home from work or school as told by your doctor. Unless you are visiting your doctor, try to avoid leaving home until your fever has been gone for 24 hours without the use of medicine.  · Keep all follow-up visits as told by your doctor. This is important.  How is this prevented?  · Getting a yearly (annual) flu shot is the best way to avoid getting the flu. You may get the flu shot in late summer, fall, or winter. Ask your doctor when you should get your flu shot.  · Wash your hands often or use hand  often.  · Avoid contact with people who are sick during cold and flu season.  · Eat healthy foods.  · Drink plenty of fluids.  · Get enough sleep.  · Exercise regularly.  Contact a doctor if:  · You get new symptoms.  · You have:  ¨ Chest pain.  ¨ Watery poop (diarrhea).  ¨ A fever.  · Your cough gets worse.  · You start to have more mucus.  · You feel sick to your stomach (nauseous).  · You throw up (vomit).  Get help right away if:  · You start to be short of breath or have trouble breathing.  · Your " skin or nails turn a bluish color.  · You have very bad pain or stiffness in your neck.  · You get a sudden headache.  · You get sudden pain in your face or ear.  · You cannot stop throwing up.  This information is not intended to replace advice given to you by your health care provider. Make sure you discuss any questions you have with your health care provider.  Document Released: 09/26/2009 Document Revised: 05/25/2017 Document Reviewed: 10/11/2016  [employer] Interactive Patient Education © 2017 [employer]  Asthma, Acute Bronchospasm  Acute bronchospasm caused by asthma is also referred to as an asthma attack. Bronchospasm means your air passages become narrowed. The narrowing is caused by inflammation and tightening of the muscles in the air tubes (bronchi) in your lungs. This can make it hard to breathe or cause you to wheeze and cough.  What are the causes?  Possible triggers are:  · Animal dander from the skin, hair, or feathers of animals.  · Dust mites contained in house dust.  · Cockroaches.  · Pollen from trees or grass.  · Mold.  · Cigarette or tobacco smoke.  · Air pollutants such as dust, household , hair sprays, aerosol sprays, paint fumes, strong chemicals, or strong odors.  · Cold air or weather changes. Cold air may trigger inflammation. Winds increase molds and pollens in the air.  · Strong emotions such as crying or laughing hard.  · Stress.  · Certain medicines such as aspirin or beta-blockers.  · Sulfites in foods and drinks, such as dried fruits and wine.  · Infections or inflammatory conditions, such as a flu, cold, or inflammation of the nasal membranes (rhinitis).  · Gastroesophageal reflux disease (GERD). GERD is a condition where stomach acid backs up into your esophagus.  · Exercise or strenuous activity.  What are the signs or symptoms?  · Wheezing.  · Excessive coughing, particularly at night.  · Chest tightness.  · Shortness of breath.  How is this diagnosed?  Your health  care provider will ask you about your medical history and perform a physical exam. A chest X-ray or blood testing may be performed to look for other causes of your symptoms or other conditions that may have triggered your asthma attack.  How is this treated?  Treatment is aimed at reducing inflammation and opening up the airways in your lungs. Most asthma attacks are treated with inhaled medicines. These include quick relief or rescue medicines (such as bronchodilators) and controller medicines (such as inhaled corticosteroids). These medicines are sometimes given through an inhaler or a nebulizer. Systemic steroid medicine taken by mouth or given through an IV tube also can be used to reduce the inflammation when an attack is moderate or severe. Antibiotic medicines are only used if a bacterial infection is present.  Follow these instructions at home:  · Rest.  · Drink plenty of liquids. This helps the mucus to remain thin and be easily coughed up. Only use caffeine in moderation and do not use alcohol until you have recovered from your illness.  · Do not smoke. Avoid being exposed to secondhand smoke.  · You play a critical role in keeping yourself in good health. Avoid exposure to things that cause you to wheeze or to have breathing problems.  · Keep your medicines up-to-date and available. Carefully follow your health care provider’s treatment plan.  · Take your medicine exactly as prescribed.  · When pollen or pollution is bad, keep windows closed and use an air conditioner or go to places with air conditioning.  · Asthma requires careful medical care. See your health care provider for a follow-up as advised. If you are more than 24 weeks pregnant and you were prescribed any new medicines, let your obstetrician know about the visit and how you are doing. Follow up with your health care provider as directed.  · After you have recovered from your asthma attack, make an appointment with your outpatient doctor to  talk about ways to reduce the likelihood of future attacks. If you do not have a doctor who manages your asthma, make an appointment with a primary care doctor to discuss your asthma.  Get help right away if:  · You are getting worse.  · You have trouble breathing. If severe, call your local emergency services (911 in the [place]  · You develop chest pain or discomfort.  · You are vomiting.  · You are not able to keep fluids down.  · You are coughing up yellow, green, brown, or bloody sputum.  · You have a fever and your symptoms suddenly get worse.  · You have trouble swallowing.  This information is not intended to replace advice given to you by your health care provider. Make sure you discuss any questions you have with your health care provider.  Document Released: 04/03/2008 Document Revised: 05/31/2017 Document Reviewed: 06/25/2014  [employer] Interactive Patient Education © 2017 [employer]

## 2019-01-29 ENCOUNTER — HOSPITAL ENCOUNTER (EMERGENCY)
Facility: MEDICAL CENTER | Age: 19
End: 2019-01-30
Attending: EMERGENCY MEDICINE
Payer: COMMERCIAL

## 2019-01-29 ENCOUNTER — APPOINTMENT (OUTPATIENT)
Dept: RADIOLOGY | Facility: MEDICAL CENTER | Age: 19
End: 2019-01-29
Payer: COMMERCIAL

## 2019-01-29 DIAGNOSIS — J06.9 VIRAL URI WITH COUGH: ICD-10-CM

## 2019-01-29 DIAGNOSIS — E87.6 HYPOKALEMIA: ICD-10-CM

## 2019-01-29 DIAGNOSIS — R06.02 SOB (SHORTNESS OF BREATH): ICD-10-CM

## 2019-01-29 PROCEDURE — 93005 ELECTROCARDIOGRAM TRACING: CPT

## 2019-01-29 PROCEDURE — 99284 EMERGENCY DEPT VISIT MOD MDM: CPT

## 2019-01-30 ENCOUNTER — APPOINTMENT (OUTPATIENT)
Dept: RADIOLOGY | Facility: MEDICAL CENTER | Age: 19
End: 2019-01-30
Attending: EMERGENCY MEDICINE
Payer: COMMERCIAL

## 2019-01-30 VITALS
WEIGHT: 293 LBS | BODY MASS INDEX: 50.02 KG/M2 | TEMPERATURE: 97 F | SYSTOLIC BLOOD PRESSURE: 141 MMHG | DIASTOLIC BLOOD PRESSURE: 87 MMHG | OXYGEN SATURATION: 94 % | HEIGHT: 64 IN | HEART RATE: 94 BPM | RESPIRATION RATE: 18 BRPM

## 2019-01-30 LAB
ALBUMIN SERPL BCP-MCNC: 3.6 G/DL (ref 3.2–4.9)
ALBUMIN/GLOB SERPL: 0.9 G/DL
ALP SERPL-CCNC: 90 U/L (ref 30–99)
ALT SERPL-CCNC: 49 U/L (ref 2–50)
ANION GAP SERPL CALC-SCNC: 10 MMOL/L (ref 0–11.9)
AST SERPL-CCNC: 23 U/L (ref 12–45)
BASOPHILS # BLD AUTO: 0.6 % (ref 0–1.8)
BASOPHILS # BLD: 0.05 K/UL (ref 0–0.12)
BILIRUB SERPL-MCNC: 0.5 MG/DL (ref 0.1–1.5)
BUN SERPL-MCNC: 10 MG/DL (ref 8–22)
CALCIUM SERPL-MCNC: 8.9 MG/DL (ref 8.4–10.2)
CHLORIDE SERPL-SCNC: 106 MMOL/L (ref 96–112)
CO2 SERPL-SCNC: 22 MMOL/L (ref 20–33)
CREAT SERPL-MCNC: 0.65 MG/DL (ref 0.5–1.4)
D DIMER PPP IA.FEU-MCNC: <0.4 UG/ML (FEU) (ref 0–0.5)
EOSINOPHIL # BLD AUTO: 0.13 K/UL (ref 0–0.51)
EOSINOPHIL NFR BLD: 1.5 % (ref 0–6.9)
ERYTHROCYTE [DISTWIDTH] IN BLOOD BY AUTOMATED COUNT: 40.1 FL (ref 35.9–50)
FLUAV RNA SPEC QL NAA+PROBE: NEGATIVE
FLUBV RNA SPEC QL NAA+PROBE: NEGATIVE
GLOBULIN SER CALC-MCNC: 3.9 G/DL (ref 1.9–3.5)
GLUCOSE SERPL-MCNC: 131 MG/DL (ref 65–99)
HCG SERPL QL: NEGATIVE
HCT VFR BLD AUTO: 41 % (ref 37–47)
HGB BLD-MCNC: 13.3 G/DL (ref 12–16)
IMM GRANULOCYTES # BLD AUTO: 0.09 K/UL (ref 0–0.11)
IMM GRANULOCYTES NFR BLD AUTO: 1 % (ref 0–0.9)
LACTATE BLD-SCNC: 2.4 MMOL/L (ref 0.5–2)
LACTATE BLD-SCNC: 2.8 MMOL/L (ref 0.5–2)
LYMPHOCYTES # BLD AUTO: 2.7 K/UL (ref 1–4.8)
LYMPHOCYTES NFR BLD: 30.4 % (ref 22–41)
MCH RBC QN AUTO: 27 PG (ref 27–33)
MCHC RBC AUTO-ENTMCNC: 32.4 G/DL (ref 33.6–35)
MCV RBC AUTO: 83.3 FL (ref 81.4–97.8)
MONOCYTES # BLD AUTO: 0.83 K/UL (ref 0–0.85)
MONOCYTES NFR BLD AUTO: 9.4 % (ref 0–13.4)
NEUTROPHILS # BLD AUTO: 5.07 K/UL (ref 2–7.15)
NEUTROPHILS NFR BLD: 57.1 % (ref 44–72)
NRBC # BLD AUTO: 0 K/UL
NRBC BLD-RTO: 0 /100 WBC
PLATELET # BLD AUTO: 230 K/UL (ref 164–446)
PMV BLD AUTO: 10.5 FL (ref 9–12.9)
POTASSIUM SERPL-SCNC: 3.4 MMOL/L (ref 3.6–5.5)
PROT SERPL-MCNC: 7.5 G/DL (ref 6–8.2)
RBC # BLD AUTO: 4.92 M/UL (ref 4.2–5.4)
SODIUM SERPL-SCNC: 138 MMOL/L (ref 135–145)
WBC # BLD AUTO: 8.9 K/UL (ref 4.8–10.8)

## 2019-01-30 PROCEDURE — A9270 NON-COVERED ITEM OR SERVICE: HCPCS

## 2019-01-30 PROCEDURE — 84703 CHORIONIC GONADOTROPIN ASSAY: CPT

## 2019-01-30 PROCEDURE — 85025 COMPLETE CBC W/AUTO DIFF WBC: CPT

## 2019-01-30 PROCEDURE — 85379 FIBRIN DEGRADATION QUANT: CPT

## 2019-01-30 PROCEDURE — 71045 X-RAY EXAM CHEST 1 VIEW: CPT

## 2019-01-30 PROCEDURE — 87502 INFLUENZA DNA AMP PROBE: CPT

## 2019-01-30 PROCEDURE — 80053 COMPREHEN METABOLIC PANEL: CPT

## 2019-01-30 PROCEDURE — 87040 BLOOD CULTURE FOR BACTERIA: CPT

## 2019-01-30 PROCEDURE — 83605 ASSAY OF LACTIC ACID: CPT

## 2019-01-30 PROCEDURE — 700102 HCHG RX REV CODE 250 W/ 637 OVERRIDE(OP)

## 2019-01-30 RX ORDER — IPRATROPIUM BROMIDE AND ALBUTEROL SULFATE 2.5; .5 MG/3ML; MG/3ML
3 SOLUTION RESPIRATORY (INHALATION)
Status: DISCONTINUED | OUTPATIENT
Start: 2019-01-30 | End: 2019-01-30 | Stop reason: HOSPADM

## 2019-01-30 RX ORDER — BENZONATATE 100 MG/1
CAPSULE ORAL
Status: COMPLETED
Start: 2019-01-30 | End: 2019-01-30

## 2019-01-30 RX ORDER — BENZONATATE 100 MG/1
100 CAPSULE ORAL 3 TIMES DAILY PRN
Qty: 21 CAP | Refills: 0 | Status: SHIPPED | OUTPATIENT
Start: 2019-01-30 | End: 2019-02-06

## 2019-01-30 RX ORDER — BENZONATATE 100 MG/1
100 CAPSULE ORAL ONCE
Status: COMPLETED | OUTPATIENT
Start: 2019-01-30 | End: 2019-01-30

## 2019-01-30 RX ADMIN — BENZONATATE 100 MG: 100 CAPSULE ORAL at 02:15

## 2019-01-30 NOTE — DISCHARGE INSTRUCTIONS
You were seen in the emergency department for cough and respiratory distress.  Your chest x-ray does not demonstrate any pneumonia.  Your lab test did not demonstrate any elevated white blood cell count, or evidence of blood clot.  Your influenza test was negative.  You are provided with a breathing treatment, which provided some improvement in your symptoms.  You are also provided with a cough medication.    Your symptoms are most likely from a viral illness.  You are being prescribed the cough medication to help control your symptoms.  You are also being prescribed a spacer to use with your inhaler, which will improve its efficacy.  Please use it every time you use your inhaler.  I recommend that you finish your course of steroids, by taking 2 pills a day for the next 3 days until your medication has been completed.    You were found to have a low potassium level.  At this time, it does not appear dangerous, however you should eat fruits and vegetables that are high in potassium.  This includes bananas, oranges, avocados, cooked beans, baked potatoes among others.  You can do an Internet search to find foods that are high in potassium.      Please follow-up with your regular doctor.    Please return to the emergency department or seek medical attention if you develop:  Difficulty breathing, severe chest pain, vomiting, other new or concerning findings

## 2019-01-30 NOTE — ED PROVIDER NOTES
ED Provider Note      Means of Arrival: Private vehicle  History obtained from: Patient, medical record      CHIEF COMPLAINT  Chief Complaint   Patient presents with   • Cough     x 9 d  To  Last Monday Placed on inhalers/steroids   • Shortness of Breath     Tonight       HPI  Mitzi Abel is a 18 y.o. female who presents with shortness of breath.  She reports that this afternoon she had onset of cough with shortness of breath that onset relatively suddenly.  She reports clear sputum production with some blood mixed in.  She reports that she had a similar episode 8 days ago, was seen in urgent care, and prescribed steroids.  She took these for approximately 1.5 days, then stopped as her symptoms had resolved.  She is also been prescribed a steroid inhaler and rescue albuterol inhaler.  She denies fevers, headache, sore throat, abdominal pain, chest pain.  Denies dysuria.  She reports that she does get leg swelling, however no focal leg swelling.  No recent travels, no recent surgeries, no history of blood clots.    REVIEW OF SYSTEMS  CONSTITUTIONAL:  No fever.  CARDIOVASCULAR:  No chest discomfort.  RESPIRATORY:  No pleuritic chest pain.  GASTROINTESTINAL:  No abdominal pain.  GENITOURINARY:   No dysuria.  MUSCULOSKELETAL:  No arthralgia.  SKIN:  No rash or suspicious lesions.  NEUROLOGIC:   No headache.  ENDOCRINE:  No facial edema.  HEMATOLOGIC:  No abnormal bleeding.       See HPI for further details.   All other systems are negative.     PAST MEDICAL HISTORY  Past Medical History:   Diagnosis Date   • BMI, pediatric > 99% for age 10/15/2010   • Borderline personality disorder (HCC)    • Eczema 10/15/2010   • Elevated blood pressure 10/15/2010   • H/O: Bell's palsy 12/26/08   • Insulin resistance 12/7/2011   • Major depressive disorder    • Myopia 10/15/2010   • Wheeze 10/15/2010       FAMILY HISTORY  Family History   Problem Relation Age of Onset   • Diabetes Mother         Gestational DM   • Genetic  "Mother         insulin resistance   • Hypertension Father    • Alcohol/Drug Father    • Thyroid Paternal Grandmother    • Diabetes Paternal Grandfather    • Heart Disease Paternal Grandfather    • Hypertension Paternal Grandfather    • Cancer Paternal Grandfather    • Psychiatry Brother         Bipolar/Bipolar       SOCIAL HISTORY   reports that she has been smoking Cigarettes.  She has been smoking about 0.25 packs per day. She has never used smokeless tobacco. She reports that she uses drugs, including Inhaled. She reports that she does not drink alcohol.    SURGICAL HISTORY  History reviewed. No pertinent surgical history.    CURRENT MEDICATIONS  Home Medications    **Home medications have not yet been reviewed for this encounter**         ALLERGIES  Allergies   Allergen Reactions   • Penicillins        PHYSICAL EXAM  VITAL SIGNS: /87   Pulse 94   Temp 36.4 °C (97.6 °F) (Oral)   Resp 18   Ht 1.626 m (5' 4\")   Wt (!) 159.7 kg (352 lb 1.2 oz)   LMP 10/29/2018   SpO2 94%   BMI 60.43 kg/m²    Gen: Alert, mild distress  HENT: ATNC, normal  Eyes: Normal conjunctiv,  Neck: trachea midline  Resp: Mild respiratory distress, with tachypnea.  Lung sounds clear, no crackles, wheezes, rales  CV: No JVD, regular rate and rhythm, no murmurs, rubs, gallops.  1+ pitting edema bilateral lower extremities  Abd: non-distended, nontender  Ext: No deformities  Psych: normal mood  Neuro: speech fluent       RADIOLOGY/PROCEDURES  DX-CHEST-PORTABLE (1 VIEW)   Final Result         1. No acute cardiopulmonary abnormalities are identified.          EKG: Sinus tachycardia, 128, normal axis, normal intervals, no ST segment changes    LABS  Results for orders placed or performed during the hospital encounter of 01/29/19   CBC WITH DIFFERENTIAL   Result Value Ref Range    WBC 8.9 4.8 - 10.8 K/uL    RBC 4.92 4.20 - 5.40 M/uL    Hemoglobin 13.3 12.0 - 16.0 g/dL    Hematocrit 41.0 37.0 - 47.0 %    MCV 83.3 81.4 - 97.8 fL    MCH 27.0 " 27.0 - 33.0 pg    MCHC 32.4 (L) 33.6 - 35.0 g/dL    RDW 40.1 35.9 - 50.0 fL    Platelet Count 230 164 - 446 K/uL    MPV 10.5 9.0 - 12.9 fL    Neutrophils-Polys 57.10 44.00 - 72.00 %    Lymphocytes 30.40 22.00 - 41.00 %    Monocytes 9.40 0.00 - 13.40 %    Eosinophils 1.50 0.00 - 6.90 %    Basophils 0.60 0.00 - 1.80 %    Immature Granulocytes 1.00 (H) 0.00 - 0.90 %    Nucleated RBC 0.00 /100 WBC    Neutrophils (Absolute) 5.07 2.00 - 7.15 K/uL    Lymphs (Absolute) 2.70 1.00 - 4.80 K/uL    Monos (Absolute) 0.83 0.00 - 0.85 K/uL    Eos (Absolute) 0.13 0.00 - 0.51 K/uL    Baso (Absolute) 0.05 0.00 - 0.12 K/uL    Immature Granulocytes (abs) 0.09 0.00 - 0.11 K/uL    NRBC (Absolute) 0.00 K/uL   COMP METABOLIC PANEL   Result Value Ref Range    Sodium 138 135 - 145 mmol/L    Potassium 3.4 (L) 3.6 - 5.5 mmol/L    Chloride 106 96 - 112 mmol/L    Co2 22 20 - 33 mmol/L    Anion Gap 10.0 0.0 - 11.9    Glucose 131 (H) 65 - 99 mg/dL    Bun 10 8 - 22 mg/dL    Creatinine 0.65 0.50 - 1.40 mg/dL    Calcium 8.9 8.4 - 10.2 mg/dL    AST(SGOT) 23 12 - 45 U/L    ALT(SGPT) 49 2 - 50 U/L    Alkaline Phosphatase 90 30 - 99 U/L    Total Bilirubin 0.5 0.1 - 1.5 mg/dL    Albumin 3.6 3.2 - 4.9 g/dL    Total Protein 7.5 6.0 - 8.2 g/dL    Globulin 3.9 (H) 1.9 - 3.5 g/dL    A-G Ratio 0.9 g/dL   D-DIMER   Result Value Ref Range    D-Dimer Screen <0.40 0.00 - 0.50 ug/mL (FEU)   Influenza A/B By PCR   Result Value Ref Range    Influenza virus A RNA Negative Negative    Influenza virus B, PCR Negative Negative   HCG QUAL SERUM   Result Value Ref Range    Beta-Hcg Qualitative Serum Negative Negative   ESTIMATED GFR   Result Value Ref Range    GFR If African American >60 >60 mL/min/1.73 m 2    GFR If Non African American >60 >60 mL/min/1.73 m 2   ISTAT LACTATE   Result Value Ref Range    iStat Lactate 2.8 (H) 0.5 - 2.0 mmol/L   ISTAT LACTATE   Result Value Ref Range    iStat Lactate 2.4 (H) 0.5 - 2.0 mmol/L         COURSE & MEDICAL DECISION MAKING  Pertinent  Labs & Imaging studies reviewed. (See chart for details)    Patient presents with rapid onset of cough and shortness of breath with clear lung sounds.  This concerning for possible asthma exacerbation, however clinically does not fit exactly.  Given the blood in the sputum, possible pulmonary embolism, however the patient does not have hypoxia, unilateral leg swelling, risk factors for this.  Given overall low risk, will rule out with d-dimer.  Will obtain chest x-ray to evaluate for possible pneumonia, pneumothorax.  Will provide DuoNeb for possible reactive airway disease component given the patient's history.  Patient has 3-day supply of prednisone still in her prescription bottle.    2:49 AM  Epic is now back on line.  During downtime, patient reported another episode of shortness of breath, however this was associated with a coughing spell.  She denies any shortness of breath when she is not coughing.  She was provided with Tessalon Perls for her cough.  In the meantime, chest x-ray was negative for pneumonia, pneumothorax, d-dimer was negative, labs showed mild hypokalemia.  Patient's influenza test was negative.  She did have an elevated lactic acid, which will be repeated.  She has been tolerating oral intake.  Her lungs have been clear on multiple reexaminations.    Given the patient's elevated lactic acid, will repeat prior to discharge.  Patient appears clinically well.  No respiratory distress between episodes of coughing.  Patient most likely has a viral URI with cough.  Given absence of wheezing, I do not believe that this is an asthma exacerbation.    3:23 AM  Patient tolerating oral intake, heart rate has down trended, lactate still mildly elevated, however downtrending, which can cause an elevation of lactate.  Clinically, she does not appear to have sepsis.    3:46 AM  Patient was able to ambulate without oxygen without significant symptoms.  She was advised to continue the remainder of her steroid  course, will be provided with a spacer, will be provided with Tessalon Perles.  She was given return precautions.  Patient was provided since the time of discharge.    FINAL IMPRESSION  1. Viral URI with cough    2. Hypokalemia    3. SOB (shortness of breath)

## 2019-01-30 NOTE — ED NOTES
Discharge information reviewed in detail. Patient verbalized understanding of discharge instructions to follow up with [facility] and to return to ER if condition worsens.    Patient educated on two new prescription(s).  to drive home.   Patient ambulated out of ER in a steady gait.

## 2019-01-30 NOTE — ED NOTES
"Patient ambulated off oxygen from room to room 10 of ER and back to room 5.  Tolerated well.  Pulse ox at 94% RA and  when she returned to room.  C/O \"pranav horse\" on left side.   "

## 2019-02-04 LAB
BACTERIA BLD CULT: NORMAL
SIGNIFICANT IND 70042: NORMAL
SITE SITE: NORMAL
SOURCE SOURCE: NORMAL

## 2019-02-11 ENCOUNTER — APPOINTMENT (OUTPATIENT)
Dept: INTERNAL MEDICINE | Facility: MEDICAL CENTER | Age: 19
End: 2019-02-11
Payer: COMMERCIAL

## 2019-03-14 ENCOUNTER — TELEPHONE (OUTPATIENT)
Dept: SCHEDULING | Facility: IMAGING CENTER | Age: 19
End: 2019-03-14

## 2019-03-14 ENCOUNTER — APPOINTMENT (OUTPATIENT)
Dept: INTERNAL MEDICINE | Facility: MEDICAL CENTER | Age: 19
End: 2019-03-14
Payer: COMMERCIAL

## 2019-03-15 ENCOUNTER — OFFICE VISIT (OUTPATIENT)
Dept: INTERNAL MEDICINE | Facility: MEDICAL CENTER | Age: 19
End: 2019-03-15
Payer: COMMERCIAL

## 2019-03-15 VITALS
RESPIRATION RATE: 19 BRPM | OXYGEN SATURATION: 94 % | TEMPERATURE: 97.2 F | DIASTOLIC BLOOD PRESSURE: 78 MMHG | HEART RATE: 99 BPM | HEIGHT: 65 IN | WEIGHT: 293 LBS | BODY MASS INDEX: 48.82 KG/M2 | SYSTOLIC BLOOD PRESSURE: 128 MMHG

## 2019-03-15 DIAGNOSIS — F60.3 BORDERLINE PERSONALITY DISORDER (HCC): ICD-10-CM

## 2019-03-15 DIAGNOSIS — Z00.00 GENERAL MEDICAL EXAM: ICD-10-CM

## 2019-03-15 DIAGNOSIS — F31.81 BIPOLAR 2 DISORDER (HCC): ICD-10-CM

## 2019-03-15 DIAGNOSIS — Z91.89 HISTORY OF SUICIDAL TENDENCIES: ICD-10-CM

## 2019-03-15 DIAGNOSIS — G47.33 OSA (OBSTRUCTIVE SLEEP APNEA): ICD-10-CM

## 2019-03-15 DIAGNOSIS — E66.01 CLASS 3 SEVERE OBESITY DUE TO EXCESS CALORIES WITHOUT SERIOUS COMORBIDITY WITH BODY MASS INDEX (BMI) OF 50.0 TO 59.9 IN ADULT (HCC): ICD-10-CM

## 2019-03-15 DIAGNOSIS — E55.9 VITAMIN D DEFICIENCY: ICD-10-CM

## 2019-03-15 DIAGNOSIS — N91.2 AMENORRHEA: ICD-10-CM

## 2019-03-15 PROBLEM — Z3A.27 27 WEEKS GESTATION OF PREGNANCY: Status: RESOLVED | Noted: 2017-11-28 | Resolved: 2019-03-15

## 2019-03-15 PROBLEM — R73.09 ELEVATED RANDOM BLOOD GLUCOSE LEVEL: Status: RESOLVED | Noted: 2017-02-27 | Resolved: 2019-03-15

## 2019-03-15 PROCEDURE — 99204 OFFICE O/P NEW MOD 45 MIN: CPT | Mod: GC | Performed by: INTERNAL MEDICINE

## 2019-03-15 ASSESSMENT — PAIN SCALES - GENERAL: PAINLEVEL: NO PAIN

## 2019-03-15 ASSESSMENT — PATIENT HEALTH QUESTIONNAIRE - PHQ9
SUM OF ALL RESPONSES TO PHQ QUESTIONS 1-9: 19
5. POOR APPETITE OR OVEREATING: 2 - MORE THAN HALF THE DAYS
CLINICAL INTERPRETATION OF PHQ2 SCORE: 4

## 2019-03-15 NOTE — PATIENT INSTRUCTIONS
Please get the labs done in the next few days.   Please get them done while fasting (no food, coffee, or juices, for 8 hours - but water is ok).     Please follow-up with the referrals.  Please return to this office in 1-2 weeks.   Thank you.

## 2019-03-15 NOTE — PROGRESS NOTES
New Patient    Reason to establish: New patient to establish  Chief Complaint   Patient presents with   • New Patient     asthma         HPI:   Mitzi Abel is a 18 y.o. female here to establish.       Asthma  Since childhood.   Tends to get irritated more every few years.   Recently had URTI, but not too much extra, from asthma.   Has generally been using albuterol inhaler, about once every 2 months.   More when cold, or with exercise.       Concern for ANA  Her mother has commented that she stops breathing at night.   Also notes she snores heavily at night.       Amenorrhea  LMP - 9/2018.    Since then, has been to ER (1/2019),   and had negative pregnancy test.   Had child in 2/2018, and had 5-6 months to get a period again.   Then had very heavy periods for about 4-5 months.   Then stopped, in 9/2018.   Previously checked labs, and was told normal   (but last check of TSH or A1C was about 2 years ago).       Bipolar 2 / depression, with anxiety  And borderline personality  Has lost job recently, and a bit down from that.   Also from being alone, at home, since then.   Has recently dealt with CPS case, and added more stress.   Has previously had SI, and had been to [place], but not for past 3 years.     Depression Screening  Little interest or pleasure in doing things?  1 - several days   Feeling down, depressed , or hopeless? 3 - nearly every day   Trouble falling or staying asleep, or sleeping too much?  3 - nearly every day   Feeling tired or having little energy?  3 - nearly every day   Poor appetite or overeating?  2 - more than half the days   Feeling bad about yourself - or that you are a failure or have let yourself or your family down? 3 - nearly every day   Trouble concentrating on things, such as reading the newspaper or watching television? 1 - several days  Moving or speaking so slowly that other people could have noticed.  Or the opposite - being so fidgety or restless that you have  been moving around a lot more than usual?  2 - more than half the days   Thoughts that you would be better off dead, or of hurting yourself?  1 - several days   Patient Health Questionnaire Score: 19   Score Severity   1-4 No Depression   5-9 Mild Depression   10-14 Moderate Depression   15-19 Moderately Severe Depression   20-27 Severe Depression    FLOR-7 scale (Generalized Anxiety Disorder)   1.  Feeling nervous, anxious, or on edge           2  2.  Not being able to stop or control worrying     3  3.  Worrying too much about different things     3  4.  Trouble relaxing                                             3  5.  Being so restless that it's hard to sit still       3  6.  Becoming easily annoyed or irritable            3  7.  Feeling afraid as if something awful might happen     2  If you checked off any problems, how difficult have these made it for you to do your work, take care of things at home, or get along with other people?   Not difficult at all __________   Somewhat difficult _________   Very difficult ________X_____   Extremely difficult _________   Total Score =   19        Review of Symptoms  GEN/CONST:   Denies fever, fatigue, weakness,     H/E:     Denies blurry vision or eye pain.  ENT:   Denies sinus pain, sore throat, ear pain, difficulty hearing   CARDIO:   Denies chest pain, palpitations, orthopnea, or edema.  RESP:   Denies shortness of breath (at rest), wheezing, or coughing.  + Hx of asthma (not in exacerbation)  + REYES, with mild exertion.   GI:    Denies nausea, vomiting, diarrhea, constipation,   :   Denies dysuria, hematuria, hesitancy, or urgency.  + Amenorrhea - see HPI.    HEME:  Denies easy bruising, bleeding,   ALLG:  + Hx of asthma and eczema.    MSK:  Denies weakness, edema, joint pains, or muscle aches.   SKIN:  Denies  itches, or sores.   + occasional eczema  NEURO:  Denies numbness or tingling, or focal weakness.    ENDO:  Denies heat or cold intolerance, polyuria,  +  polydipsia and polyphagia.  PSYCH: see HPI.       Past Medical History:   Diagnosis Date   • Anxiety    • Bipolar 2 disorder    • Borderline personality disorder (HCC)    • Eczema 10/15/2010   • H/O: Bell's palsy 12/26/08   • Major depressive disorder    • Myopia 10/15/2010   • Obesity    • Wheeze 10/15/2010       History reviewed. No pertinent surgical history.      Family History   Problem Relation Age of Onset   • Diabetes Mother         Gestational DM   • Psychiatry Mother         depression   • Arthritis Mother    • Hypertension Father    • Alcohol/Drug Father    • Psychiatry Father         bipolar   • Thyroid Paternal Grandmother    • Diabetes Paternal Grandfather    • Heart Disease Paternal Grandfather    • Hypertension Paternal Grandfather    • Cancer Paternal Grandfather         prostate   • Psychiatry Brother         Bipolar/Bipolar   • Arthritis Maternal Grandfather        Social History     Social History   • Marital status:      Spouse name: N/A   • Number of children: N/A   • Years of education: N/A     Occupational History   • Not on file.     Social History Main Topics   • Smoking status: Current Every Day Smoker     Packs/day: 0.50     Years: 11.00     Types: Cigarettes   • Smokeless tobacco: Never Used   • Alcohol use No   • Drug use: Yes     Types: Inhaled      Comment: Hx of meth. last use 5/2017   • Sexual activity: Yes     Partners: Male      Comment:      Other Topics Concern   • Not on file     Social History Narrative   • No narrative on file       Current Outpatient Prescriptions   Medication Sig Dispense Refill   • Spacer/Aero-Holding Chambers Device Use with your inhaler every time you use your inhaler. (Patient not taking: Reported on 3/15/2019) 1 Device 0   • Albuterol Sulfate 108 (90 Base) MCG/ACT AEROSOL POWDER, BREATH ACTIVATED Inhale 1-2 Puffs by mouth every 6 hours as needed. (Patient not taking: Reported on 3/15/2019) 1 Each 0     No current facility-administered  "medications for this visit.        Allergies as of 03/15/2019 - Reviewed 03/15/2019   Allergen Reaction Noted   • Penicillins  12/23/2008       Physical Exam  /78 (BP Location: Left arm, Patient Position: Sitting, BP Cuff Size: Large adult long)   Pulse 99   Temp 36.2 °C (97.2 °F) (Temporal)   Resp 19   Ht 1.64 m (5' 4.57\")   Wt (!) 154.5 kg (340 lb 9.6 oz)   SpO2 94%   Breastfeeding? No   BMI 57.44 kg/m²   General:  Alert and oriented, No apparent distress.  Morbidly Obese  Eyes: Pupils equal and reactive. EOMI.  No scleral icterus.   Throat: Clear, no erythema or exudates noted.  Mallampatti score - 4.  Neck: Supple. No lymphadenopathy noted.   Questionable palpation of thyroid (difficult, due to obesity).    Lungs: Clear to auscultation bilaterally.  No wheezes or rales.  Cardiovascular: Difficult to auscultate, due to body habitus.  In upper chest, had regular rate and rhythm, without any obvious murmurs or rubs noted.  In mid-lower chest, very very difficult to hear heart sounds, due to body habitus.  Abdomen:  Soft.  Non-distended.  Non-tender.  No rebound or guarding noted.  Extremities: No pedal edema, but large legs.  Good general motion of all 4 extremities.     Neurological:  Motor function and sensation grossly intact and symmetrical.   Psychological: Appears to have normal mood and affect.      Labs:  Reviewed - CBC and CMP from ER, 2 months ago      Assessment & Plan    1. Bipolar 2 disorder  2. Borderline personality disorder (HCC)  3. History of suicidal tendencies  Patient has past history of SI.  Patient notes currently better than before.  FLOR 7 and PHQ 9 scores were 19, each.  Patient open to seeing specialist.  Will refer to both psychiatry and psychology.  - REFERRAL TO PSYCHIATRY  - REFERRAL TO PSYCHOLOGY    4. Vitamin D deficiency  Previously had a low vitamin D level.  Will obtain vitamin D, for evaluation.  - VITAMIN D,25 HYDROXY; Future    5. ANA (obstructive sleep apnea) - " suspected  Patient has previously been told that she snores,   and has previously stopped breathing, during the night.    Her BMI is 57, and Mallampati score of 4.  Will refer for sleep studies, for evaluation for likely ANA.  - REFERRAL TO SLEEP STUDIES    6. Class 3 severe obesity   Despite the patient's young age, given her BMI (57),  we will get a lipid profile, to evaluate for hyperlipidemia,  in addition to TSH and A1c screening.  - TSH WITH REFLEX TO FT4; Future  - HEMOGLOBIN A1C; Future  - Lipid Profile; Future    7. Amenorrhea  Hx of childbirth, then menorrhagia, then amenorrhea.  Given her obesity, potential for PCOS is a possibility.  But last A1c was normal (but 2 years ago).  (Will get new TSH and A1c).  Will refer to GYN, for further evaluation.  - REFERRAL TO GYNECOLOGY    8. General medical exam  Will obtain basic lab work,   for baseline reference and screening,   as well as for issues listed above.    (And to check for changes,   since last labs, given her symptoms).  - CBC WITH DIFFERENTIAL; Future  - Comp Metabolic Panel; Future      Health Maintenance  Dexa, Colonoscopy, Mammogram - n/a  Pap - 11/2018  (told normal)  influ vaccine - no / declines  Pneumo Vaccine - n/a  Tetanus -  10/2010  (will need in next 1 - 2 years)  Labs < 12 mo / met screen - CBC and CMP from ER, 2 months ago      Instructions given to the patient:  Please get the labs done in the next few days.   Please get them done while fasting (no food, coffee, or juices, for 8 hours - but water is ok).     Please follow-up with the referrals.  Please return to this office in 1-2 weeks.   Thank you.       A computerized dictation system may have been used for this note.    Despite review, there may be some spelling or grammatical errors.    Nils Lambert M.D.  3/15/2019   Attending:    Salvador Sanchez M.D.

## 2019-04-26 ENCOUNTER — HOSPITAL ENCOUNTER (EMERGENCY)
Dept: HOSPITAL 8 - ED | Age: 19
Discharge: HOME | End: 2019-04-26
Payer: COMMERCIAL

## 2019-04-26 VITALS — DIASTOLIC BLOOD PRESSURE: 78 MMHG | SYSTOLIC BLOOD PRESSURE: 132 MMHG

## 2019-04-26 VITALS — WEIGHT: 293 LBS | BODY MASS INDEX: 50.02 KG/M2 | HEIGHT: 64 IN

## 2019-04-26 DIAGNOSIS — J02.0: Primary | ICD-10-CM

## 2019-04-26 DIAGNOSIS — H92.03: ICD-10-CM

## 2019-04-26 PROCEDURE — 87880 STREP A ASSAY W/OPTIC: CPT

## 2019-04-26 PROCEDURE — 87400 INFLUENZA A/B EACH AG IA: CPT

## 2019-04-26 PROCEDURE — 71046 X-RAY EXAM CHEST 2 VIEWS: CPT

## 2019-04-26 PROCEDURE — 99284 EMERGENCY DEPT VISIT MOD MDM: CPT

## 2019-04-26 NOTE — NUR
Patient/Caregiver given discharge instructions and they have confirmed that 
they understand the instructions.  Patient ambulatory with steady gait. pt left 
with all personal belongings.

## 2019-05-17 ENCOUNTER — HOSPITAL ENCOUNTER (EMERGENCY)
Facility: MEDICAL CENTER | Age: 19
End: 2019-05-17
Attending: EMERGENCY MEDICINE
Payer: MEDICAID

## 2019-05-17 VITALS
TEMPERATURE: 96.9 F | BODY MASS INDEX: 50.02 KG/M2 | SYSTOLIC BLOOD PRESSURE: 141 MMHG | HEIGHT: 64 IN | HEART RATE: 89 BPM | WEIGHT: 293 LBS | DIASTOLIC BLOOD PRESSURE: 84 MMHG | OXYGEN SATURATION: 97 %

## 2019-05-17 DIAGNOSIS — B00.89 HERPETIC WHITLOW: ICD-10-CM

## 2019-05-17 DIAGNOSIS — S41.109A: ICD-10-CM

## 2019-05-17 PROCEDURE — 99284 EMERGENCY DEPT VISIT MOD MDM: CPT

## 2019-05-17 PROCEDURE — A9270 NON-COVERED ITEM OR SERVICE: HCPCS | Performed by: EMERGENCY MEDICINE

## 2019-05-17 PROCEDURE — 700102 HCHG RX REV CODE 250 W/ 637 OVERRIDE(OP): Performed by: EMERGENCY MEDICINE

## 2019-05-17 RX ORDER — VALACYCLOVIR HYDROCHLORIDE 500 MG/1
1000 TABLET, FILM COATED ORAL ONCE
Status: COMPLETED | OUTPATIENT
Start: 2019-05-17 | End: 2019-05-17

## 2019-05-17 RX ORDER — FLUCONAZOLE 150 MG/1
150 TABLET ORAL SEE ADMIN INSTRUCTIONS
Status: SHIPPED | COMMUNITY
Start: 2019-05-13 | End: 2019-10-04

## 2019-05-17 RX ORDER — VALACYCLOVIR HYDROCHLORIDE 1 G/1
1000 TABLET, FILM COATED ORAL 3 TIMES DAILY
Qty: 21 TAB | Refills: 0 | Status: SHIPPED | OUTPATIENT
Start: 2019-05-17 | End: 2019-05-24

## 2019-05-17 RX ORDER — CEPHALEXIN 250 MG/1
500 CAPSULE ORAL ONCE
Status: COMPLETED | OUTPATIENT
Start: 2019-05-17 | End: 2019-05-17

## 2019-05-17 RX ORDER — CEPHALEXIN 500 MG/1
500 CAPSULE ORAL 4 TIMES DAILY
Qty: 28 CAP | Refills: 0 | Status: SHIPPED | OUTPATIENT
Start: 2019-05-17 | End: 2019-05-24

## 2019-05-17 RX ADMIN — VALACYCLOVIR HYDROCHLORIDE 1000 MG: 500 TABLET, FILM COATED ORAL at 11:27

## 2019-05-17 RX ADMIN — CEPHALEXIN 500 MG: 250 CAPSULE ORAL at 11:27

## 2019-05-17 ASSESSMENT — PAIN DESCRIPTION - DESCRIPTORS: DESCRIPTORS: ACHING

## 2019-05-17 NOTE — ED NOTES
Med rec completed per pt and home pharmacy ([facility])  Allergies reviewed  No PO antibiotics in the last 30 days    Pt started a course of Fluconazole 5/13/19. Directions are take 1 tab every 3 days for 3 doses

## 2019-05-17 NOTE — ED NOTES
"Presents complaining of worsening right arm pain, and herpes zoster for approximately a week.  She has been seen recently, at a urgent care for similar symptomatology.  She denies fever.  Chief Complaint   Patient presents with   • Arm Pain   • Herpes Zoster     /84   Pulse 98   Temp 36.3 °C (97.4 °F) (Temporal)   Ht 1.626 m (5' 4\")   Wt (!) 152 kg (335 lb 1.6 oz)   SpO2 96%   BMI 57.52 kg/m²     "

## 2019-05-17 NOTE — ED PROVIDER NOTES
ED Provider Note    CHIEF COMPLAINT   Chief Complaint   Patient presents with   • Arm Pain   • Herpes Zoster         HPI   Mitzi Abel is a 18 y.o. female who presents to the ED secondary to arm lesions.  The patient states of actually 6 days ago the patient started noticing a rash in her right fluconazole cream.  The patient states that these wounds slowly progressed into a little bubbles and then rupture, the patient then started noticing some little vesicles on the thumb.  She states that if she does not have hyperesthesias throughout the arm just has pain throughout the right thumb, mild erythema around 1 of the lesions, no fevers, chest pain, shortness of breath    REVIEW OF SYSTEMS   See HPI for further details.     PAST MEDICAL HISTORY   Past Medical History:   Diagnosis Date   • Anxiety    • Bipolar 2 disorder    • Borderline personality disorder (HCC)    • Eczema 10/15/2010   • H/O: Bell's palsy 12/26/08   • Major depressive disorder    • Myopia 10/15/2010   • Obesity        FAMILY HISTORY  Family History   Problem Relation Age of Onset   • Diabetes Mother         Gestational DM   • Psychiatry Mother         depression   • Arthritis Mother    • Hypertension Father    • Alcohol/Drug Father    • Psychiatry Father         bipolar   • Thyroid Paternal Grandmother    • Diabetes Paternal Grandfather    • Heart Disease Paternal Grandfather    • Hypertension Paternal Grandfather    • Cancer Paternal Grandfather         prostate   • Psychiatry Brother         Bipolar/Bipolar   • Arthritis Maternal Grandfather        SOCIAL HISTORY  Social History     Social History   • Marital status:      Spouse name: N/A   • Number of children: N/A   • Years of education: N/A     Social History Main Topics   • Smoking status: Current Every Day Smoker     Packs/day: 0.50     Years: 11.00     Types: Cigarettes   • Smokeless tobacco: Never Used   • Alcohol use No   • Drug use: Yes     Types: Inhaled      Comment: Hx  "of meth. last use 5/2017   • Sexual activity: Yes     Partners: Male      Comment:      Other Topics Concern   • Not on file     Social History Narrative   • No narrative on file        SURGICAL HISTORY  History reviewed. No pertinent surgical history.    CURRENT MEDICATIONS   Home Medications     Reviewed by Hernan Smith (Pharmacy Tech) on 05/17/19 at 1006  Med List Status: Complete   Medication Last Dose Status   Albuterol Sulfate 108 (90 Base) MCG/ACT AEROSOL POWDER, BREATH ACTIVATED PRN Active   fluconazole (DIFLUCAN) 150 MG tablet 5/16/2019 Active                ALLERGIES   Allergies   Allergen Reactions   • Penicillins        PHYSICAL EXAM  VITAL SIGNS: /84   Pulse 98   Temp 36.3 °C (97.4 °F) (Temporal)   Ht 1.626 m (5' 4\")   Wt (!) 152 kg (335 lb 1.6 oz)   SpO2 96%   BMI 57.52 kg/m²   Constitutional: Well developed, Well nourished, mild distress, Non-toxic appearance.   HENT: Atraumatic, Normocephalic, Oral pharynx with moist mucous membranes.  Eyes: EOMI, PERRL  Cardiovascular: Good pulses  Thorax & Lungs: No respiratory distress  Skin: Patient with 2 healing ulcers of the right proximal bicep area, one with slight surrounding erythema, no abscesses, the right dorsal thumb has what appears to be a herpetic navin, with some vesicles at the base of the nailbed, slight soft tissue swelling, with erythema.  No gross purulence, no streaking.  The patient does not have any hyperesthesias along that dermatome  Extremities: No gross deformity seen  Neuro: Awake, alert, moved all extremities spontaneously and purposefully      COURSE & MEDICAL DECISION MAKING  Pertinent Labs & Imaging studies reviewed. (See chart for details)  Patient has areas of open wounds of her right proximal bicep area also with what appears to be herpetic navin, this could easily be herpes zoster, I will treat the patient with valacyclovir, cover the patient with Keflex, have the patient return with increasing pain " redness swelling or any other concerns per        FINAL IMPRESSION  1. Open wound of axillary region with complication, initial encounter    2. Herpetic navin    .    Patient referred to primary care provider for blood pressure management    This dictation was created using voice recognition software. The accuracy of the dictation is limited to the abilities of the software. I expect there may be some errors of grammar and possibly content. The nursing notes were reviewed and certain aspects of this information were incorporated into this note.    Electronically signed by: Jeison Salazar, 5/17/2019 11:14 AM

## 2019-08-08 PROCEDURE — 99285 EMERGENCY DEPT VISIT HI MDM: CPT

## 2019-08-09 ENCOUNTER — APPOINTMENT (OUTPATIENT)
Dept: RADIOLOGY | Facility: MEDICAL CENTER | Age: 19
End: 2019-08-09
Attending: EMERGENCY MEDICINE
Payer: MEDICAID

## 2019-08-09 ENCOUNTER — HOSPITAL ENCOUNTER (EMERGENCY)
Facility: MEDICAL CENTER | Age: 19
End: 2019-08-09
Attending: EMERGENCY MEDICINE
Payer: MEDICAID

## 2019-08-09 VITALS
BODY MASS INDEX: 50.02 KG/M2 | TEMPERATURE: 98.2 F | SYSTOLIC BLOOD PRESSURE: 123 MMHG | WEIGHT: 293 LBS | OXYGEN SATURATION: 95 % | HEIGHT: 64 IN | RESPIRATION RATE: 18 BRPM | HEART RATE: 105 BPM | DIASTOLIC BLOOD PRESSURE: 67 MMHG

## 2019-08-09 DIAGNOSIS — R19.7 NAUSEA VOMITING AND DIARRHEA: ICD-10-CM

## 2019-08-09 DIAGNOSIS — E66.01 CLASS 3 SEVERE OBESITY DUE TO EXCESS CALORIES WITHOUT SERIOUS COMORBIDITY WITH BODY MASS INDEX (BMI) OF 50.0 TO 59.9 IN ADULT (HCC): ICD-10-CM

## 2019-08-09 DIAGNOSIS — F41.9 ANXIETY: ICD-10-CM

## 2019-08-09 DIAGNOSIS — R11.2 NAUSEA VOMITING AND DIARRHEA: ICD-10-CM

## 2019-08-09 DIAGNOSIS — F31.9 BIPOLAR AFFECTIVE DISORDER, REMISSION STATUS UNSPECIFIED (HCC): ICD-10-CM

## 2019-08-09 DIAGNOSIS — K64.4 EXTERNAL HEMORRHOID: ICD-10-CM

## 2019-08-09 DIAGNOSIS — R10.9 ACUTE ABDOMINAL PAIN: ICD-10-CM

## 2019-08-09 DIAGNOSIS — N30.00 ACUTE CYSTITIS WITHOUT HEMATURIA: ICD-10-CM

## 2019-08-09 DIAGNOSIS — K92.1 HEMATOCHEZIA: ICD-10-CM

## 2019-08-09 DIAGNOSIS — Z72.0 TOBACCO ABUSE: ICD-10-CM

## 2019-08-09 DIAGNOSIS — K92.0 HEMATEMESIS WITH NAUSEA: ICD-10-CM

## 2019-08-09 LAB
ABO GROUP BLD: NORMAL
ALBUMIN SERPL BCP-MCNC: 4.2 G/DL (ref 3.2–4.9)
ALBUMIN/GLOB SERPL: 1.2 G/DL
ALP SERPL-CCNC: 73 U/L (ref 30–99)
ALT SERPL-CCNC: 48 U/L (ref 2–50)
ANION GAP SERPL CALC-SCNC: 10 MMOL/L (ref 0–11.9)
APPEARANCE UR: CLEAR
APTT PPP: 25.7 SEC (ref 24.7–36)
AST SERPL-CCNC: 21 U/L (ref 12–45)
BACTERIA #/AREA URNS HPF: ABNORMAL /HPF
BASOPHILS # BLD AUTO: 0.3 % (ref 0–1.8)
BASOPHILS # BLD: 0.03 K/UL (ref 0–0.12)
BILIRUB SERPL-MCNC: 1.4 MG/DL (ref 0.1–1.5)
BILIRUB UR QL STRIP.AUTO: ABNORMAL
BLD GP AB SCN SERPL QL: NORMAL
BUN SERPL-MCNC: 10 MG/DL (ref 8–22)
CALCIUM SERPL-MCNC: 9.5 MG/DL (ref 8.5–10.5)
CHLORIDE SERPL-SCNC: 108 MMOL/L (ref 96–112)
CO2 SERPL-SCNC: 22 MMOL/L (ref 20–33)
COLOR UR: ABNORMAL
CREAT SERPL-MCNC: 0.85 MG/DL (ref 0.5–1.4)
EKG IMPRESSION: NORMAL
EOSINOPHIL # BLD AUTO: 0.08 K/UL (ref 0–0.51)
EOSINOPHIL NFR BLD: 0.9 % (ref 0–6.9)
EPI CELLS #/AREA URNS HPF: ABNORMAL /HPF
ERYTHROCYTE [DISTWIDTH] IN BLOOD BY AUTOMATED COUNT: 41.5 FL (ref 35.9–50)
GLOBULIN SER CALC-MCNC: 3.6 G/DL (ref 1.9–3.5)
GLUCOSE SERPL-MCNC: 127 MG/DL (ref 65–99)
GLUCOSE UR STRIP.AUTO-MCNC: NEGATIVE MG/DL
HCG SERPL QL: NEGATIVE
HCT VFR BLD AUTO: 39.3 % (ref 37–47)
HGB BLD-MCNC: 13.3 G/DL (ref 12–16)
HYALINE CASTS #/AREA URNS LPF: ABNORMAL /LPF
IMM GRANULOCYTES # BLD AUTO: 0.02 K/UL (ref 0–0.11)
IMM GRANULOCYTES NFR BLD AUTO: 0.2 % (ref 0–0.9)
INR PPP: 1.04 (ref 0.87–1.13)
KETONES UR STRIP.AUTO-MCNC: NEGATIVE MG/DL
LACTATE BLD-SCNC: 1.1 MMOL/L (ref 0.5–2)
LEUKOCYTE ESTERASE UR QL STRIP.AUTO: ABNORMAL
LIPASE SERPL-CCNC: 13 U/L (ref 11–82)
LYMPHOCYTES # BLD AUTO: 2.16 K/UL (ref 1–4.8)
LYMPHOCYTES NFR BLD: 23 % (ref 22–41)
MCH RBC QN AUTO: 29.2 PG (ref 27–33)
MCHC RBC AUTO-ENTMCNC: 33.8 G/DL (ref 33.6–35)
MCV RBC AUTO: 86.2 FL (ref 81.4–97.8)
MICRO URNS: ABNORMAL
MONOCYTES # BLD AUTO: 0.55 K/UL (ref 0–0.85)
MONOCYTES NFR BLD AUTO: 5.9 % (ref 0–13.4)
MUCOUS THREADS #/AREA URNS HPF: ABNORMAL /HPF
NEUTROPHILS # BLD AUTO: 6.55 K/UL (ref 2–7.15)
NEUTROPHILS NFR BLD: 69.7 % (ref 44–72)
NITRITE UR QL STRIP.AUTO: NEGATIVE
NRBC # BLD AUTO: 0 K/UL
NRBC BLD-RTO: 0 /100 WBC
PH UR STRIP.AUTO: 5.5 [PH] (ref 5–8)
PLATELET # BLD AUTO: 230 K/UL (ref 164–446)
PMV BLD AUTO: 10.5 FL (ref 9–12.9)
POTASSIUM SERPL-SCNC: 3.7 MMOL/L (ref 3.6–5.5)
PROT SERPL-MCNC: 7.8 G/DL (ref 6–8.2)
PROT UR QL STRIP: NEGATIVE MG/DL
PROTHROMBIN TIME: 13.9 SEC (ref 12–14.6)
RBC # BLD AUTO: 4.56 M/UL (ref 4.2–5.4)
RBC # URNS HPF: ABNORMAL /HPF
RBC UR QL AUTO: NEGATIVE
RH BLD: NORMAL
SODIUM SERPL-SCNC: 140 MMOL/L (ref 135–145)
SP GR UR REFRACTOMETRY: >1.045
UROBILINOGEN UR STRIP.AUTO-MCNC: 1 MG/DL
WBC # BLD AUTO: 9.4 K/UL (ref 4.8–10.8)
WBC #/AREA URNS HPF: ABNORMAL /HPF

## 2019-08-09 PROCEDURE — 36415 COLL VENOUS BLD VENIPUNCTURE: CPT

## 2019-08-09 PROCEDURE — 84703 CHORIONIC GONADOTROPIN ASSAY: CPT

## 2019-08-09 PROCEDURE — 87086 URINE CULTURE/COLONY COUNT: CPT

## 2019-08-09 PROCEDURE — 96375 TX/PRO/DX INJ NEW DRUG ADDON: CPT

## 2019-08-09 PROCEDURE — 71045 X-RAY EXAM CHEST 1 VIEW: CPT

## 2019-08-09 PROCEDURE — 80053 COMPREHEN METABOLIC PANEL: CPT

## 2019-08-09 PROCEDURE — 74177 CT ABD & PELVIS W/CONTRAST: CPT

## 2019-08-09 PROCEDURE — 700111 HCHG RX REV CODE 636 W/ 250 OVERRIDE (IP): Performed by: EMERGENCY MEDICINE

## 2019-08-09 PROCEDURE — 86901 BLOOD TYPING SEROLOGIC RH(D): CPT

## 2019-08-09 PROCEDURE — 93005 ELECTROCARDIOGRAM TRACING: CPT | Performed by: EMERGENCY MEDICINE

## 2019-08-09 PROCEDURE — 96374 THER/PROPH/DIAG INJ IV PUSH: CPT | Mod: XU

## 2019-08-09 PROCEDURE — 81001 URINALYSIS AUTO W/SCOPE: CPT

## 2019-08-09 PROCEDURE — 85610 PROTHROMBIN TIME: CPT

## 2019-08-09 PROCEDURE — 83605 ASSAY OF LACTIC ACID: CPT

## 2019-08-09 PROCEDURE — 700105 HCHG RX REV CODE 258: Performed by: EMERGENCY MEDICINE

## 2019-08-09 PROCEDURE — 85025 COMPLETE CBC W/AUTO DIFF WBC: CPT

## 2019-08-09 PROCEDURE — 85730 THROMBOPLASTIN TIME PARTIAL: CPT

## 2019-08-09 PROCEDURE — 86850 RBC ANTIBODY SCREEN: CPT

## 2019-08-09 PROCEDURE — 83690 ASSAY OF LIPASE: CPT

## 2019-08-09 PROCEDURE — 86900 BLOOD TYPING SEROLOGIC ABO: CPT

## 2019-08-09 PROCEDURE — 700117 HCHG RX CONTRAST REV CODE 255: Performed by: EMERGENCY MEDICINE

## 2019-08-09 RX ORDER — HALOPERIDOL 5 MG/ML
5 INJECTION INTRAMUSCULAR ONCE
Status: COMPLETED | OUTPATIENT
Start: 2019-08-09 | End: 2019-08-09

## 2019-08-09 RX ORDER — OMEPRAZOLE 40 MG/1
40 CAPSULE, DELAYED RELEASE ORAL DAILY
Qty: 14 CAP | Refills: 0 | Status: SHIPPED | OUTPATIENT
Start: 2019-08-09 | End: 2019-08-23

## 2019-08-09 RX ORDER — NITROFURANTOIN 25; 75 MG/1; MG/1
100 CAPSULE ORAL 2 TIMES DAILY
Qty: 10 CAP | Refills: 0 | Status: SHIPPED | OUTPATIENT
Start: 2019-08-09 | End: 2019-08-14

## 2019-08-09 RX ORDER — SODIUM CHLORIDE, SODIUM LACTATE, POTASSIUM CHLORIDE, CALCIUM CHLORIDE 600; 310; 30; 20 MG/100ML; MG/100ML; MG/100ML; MG/100ML
1000 INJECTION, SOLUTION INTRAVENOUS ONCE
Status: COMPLETED | OUTPATIENT
Start: 2019-08-09 | End: 2019-08-09

## 2019-08-09 RX ADMIN — FAMOTIDINE 20 MG: 10 INJECTION INTRAVENOUS at 00:42

## 2019-08-09 RX ADMIN — IOHEXOL 100 ML: 350 INJECTION, SOLUTION INTRAVENOUS at 02:12

## 2019-08-09 RX ADMIN — HALOPERIDOL LACTATE 5 MG: 5 INJECTION, SOLUTION INTRAMUSCULAR at 00:42

## 2019-08-09 RX ADMIN — SODIUM CHLORIDE, POTASSIUM CHLORIDE, SODIUM LACTATE AND CALCIUM CHLORIDE 1000 ML: 600; 310; 30; 20 INJECTION, SOLUTION INTRAVENOUS at 00:42

## 2019-08-09 NOTE — ED PROVIDER NOTES
"ED PROVIDER NOTE     Scribed for Dread Lewis M.D. by Licha Avitia. 8/9/2019, 12:22 AM.    CHIEF COMPLAINT  Chief Complaint   Patient presents with   • N/V     says she has been throwing up and blood in stool for two weeks   • Blood in Vomit   • Abdominal Cramping     started tonight   • Bloody Stools       HPI    Primary care provider: Nils Lambert M.D.  Means of arrival: Walk in  History obtained from: Patient  History limited by: None    Mitzi Abel is a 18 y.o. female who presents to the ED for evaluation of intermittent nausea and vomiting that has been ongoing for approximately 2 weeks. The patient reports she has had multiple episodes of emesis since onset, and notes an abnormal emesis today that was similar to charcoal in appearance. She reports she became concerned today after she developed associated lower abdominal pain described as \"cramping\" in quality that radiates to her back. The patient was later prompted to come to the ED tonight after she had a questionable bloody stool. She also endorses chills, but no fever or chest pain. The patient notes she has not experienced similar symptoms in the past. She reports a questionable familial history of ulcerative colitis as both her mother and sister have been diagnosed with ulcers due to excessive drinking, but she reports she only drinks alcohol occasionally. The patient denies any past abdominal surgeries. The patient also currently has a hoarse voice with an associated sore throat, but states this has been ongoing for 2 weeks secondary to her allergies. She is on birth control and states her last menstrual period was last week.     REVIEW OF SYSTEMS  Constitutional: Positive for chills. Negative for fever.   HENT: Positive for horse voice and sore throat.   Cardiovascular: Negative for chest pain.  Gastrointestinal: Positive for nausea, vomiting, abdominal pain, and bloody stool.   All other systems reviewed and are " "negative.    PAST MEDICAL HISTORY   has a past medical history of Anxiety, Bipolar 2 disorder, Borderline personality disorder (HCC), Eczema (10/15/2010), H/O: Bell's palsy (12/26/08), Major depressive disorder, Myopia (10/15/2010), and Obesity.    PAST FAMILY HISTORY  Family History   Problem Relation Age of Onset   • Diabetes Mother         Gestational DM   • Psychiatric Illness Mother         depression   • Arthritis Mother    • Hypertension Father    • Alcohol/Drug Father    • Psychiatric Illness Father         bipolar   • Thyroid Paternal Grandmother    • Diabetes Paternal Grandfather    • Heart Disease Paternal Grandfather    • Hypertension Paternal Grandfather    • Cancer Paternal Grandfather         prostate   • Psychiatric Illness Brother         Bipolar/Bipolar   • Arthritis Maternal Grandfather        SOCIAL HISTORY  Social History     Tobacco Use   • Smoking status: Current Every Day Smoker     Packs/day: 0.50     Years: 11.00     Pack years: 5.50     Types: Cigarettes   • Smokeless tobacco: Never Used   Substance and Sexual Activity   • Alcohol use: No   • Drug use: Yes     Types: Inhaled     Comment: Hx of meth. last use 5/2017   • Sexual activity: Yes     Partners: Male     Comment:        SURGICAL HISTORY  patient denies any surgical history    CURRENT MEDICATIONS  Home Medications     Reviewed by Wali Brown R.N. (Registered Nurse) on 08/08/19 at 2342  Med List Status: <None>   Medication Last Dose Status   Albuterol Sulfate 108 (90 Base) MCG/ACT AEROSOL POWDER, BREATH ACTIVATED  Active   fluconazole (DIFLUCAN) 150 MG tablet  Active                ALLERGIES  Allergies   Allergen Reactions   • Penicillins        PHYSICAL EXAM  VITAL SIGNS: /95   Pulse (!) 112   Temp 36.8 °C (98.2 °F) (Temporal)   Resp 18   Ht 1.626 m (5' 4\")   Wt (!) 152 kg (335 lb 1.6 oz)   SpO2 96%   BMI 57.52 kg/m²    Pulse ox interpretation: On room air, I interpret this pulse ox as " normal.  Constitutional: Morbidly obese sitting up in mild distress.  HEENT: Normocephalic, atraumatic. Posterior pharynx clear, mucous membranes dry with no exudates.  Eyes:  Slightly pale conjunctivae. EOMI. Normal sclerae.  Neck: Supple, nontender.  Chest/Pulmonary: Hoarse voice, Slightly diminished breath sounds bilaterally, no wheezes or rhonchi.  Cardiovascular: Tachycardic rate and regular rhythm, no murmur.   Abdomen: Diffuse lower abdominal tenderness right greater than left, no rebound or guarding.   Rectal: External hemorrhoid noted. No active bleeding or melena. Brown stool.   Back: No CVA or midline tenderness.   Musculoskeletal: No deformity or edema.  Neuro: Clear speech, normal coordination, cranial nerves II-XII grossly intact, no focal asymmetry or sensory deficits.   Psych: Odd, flat affect but cooperative.  Skin: No rashes, warm and dry.  Old sites of superficial self cutting to her left anterior forearm.    DIAGNOSTIC STUDIES / PROCEDURES    LABS & EKG  Results for orders placed or performed during the hospital encounter of 08/09/19   CBC WITH DIFFERENTIAL   Result Value Ref Range    WBC 9.4 4.8 - 10.8 K/uL    RBC 4.56 4.20 - 5.40 M/uL    Hemoglobin 13.3 12.0 - 16.0 g/dL    Hematocrit 39.3 37.0 - 47.0 %    MCV 86.2 81.4 - 97.8 fL    MCH 29.2 27.0 - 33.0 pg    MCHC 33.8 33.6 - 35.0 g/dL    RDW 41.5 35.9 - 50.0 fL    Platelet Count 230 164 - 446 K/uL    MPV 10.5 9.0 - 12.9 fL    Neutrophils-Polys 69.70 44.00 - 72.00 %    Lymphocytes 23.00 22.00 - 41.00 %    Monocytes 5.90 0.00 - 13.40 %    Eosinophils 0.90 0.00 - 6.90 %    Basophils 0.30 0.00 - 1.80 %    Immature Granulocytes 0.20 0.00 - 0.90 %    Nucleated RBC 0.00 /100 WBC    Neutrophils (Absolute) 6.55 2.00 - 7.15 K/uL    Lymphs (Absolute) 2.16 1.00 - 4.80 K/uL    Monos (Absolute) 0.55 0.00 - 0.85 K/uL    Eos (Absolute) 0.08 0.00 - 0.51 K/uL    Baso (Absolute) 0.03 0.00 - 0.12 K/uL    Immature Granulocytes (abs) 0.02 0.00 - 0.11 K/uL    NRBC  (Absolute) 0.00 K/uL   COMP METABOLIC PANEL   Result Value Ref Range    Sodium 140 135 - 145 mmol/L    Potassium 3.7 3.6 - 5.5 mmol/L    Chloride 108 96 - 112 mmol/L    Co2 22 20 - 33 mmol/L    Anion Gap 10.0 0.0 - 11.9    Glucose 127 (H) 65 - 99 mg/dL    Bun 10 8 - 22 mg/dL    Creatinine 0.85 0.50 - 1.40 mg/dL    Calcium 9.5 8.5 - 10.5 mg/dL    AST(SGOT) 21 12 - 45 U/L    ALT(SGPT) 48 2 - 50 U/L    Alkaline Phosphatase 73 30 - 99 U/L    Total Bilirubin 1.4 0.1 - 1.5 mg/dL    Albumin 4.2 3.2 - 4.9 g/dL    Total Protein 7.8 6.0 - 8.2 g/dL    Globulin 3.6 (H) 1.9 - 3.5 g/dL    A-G Ratio 1.2 g/dL   LIPASE   Result Value Ref Range    Lipase 13 11 - 82 U/L   LACTIC ACID   Result Value Ref Range    Lactic Acid 1.1 0.5 - 2.0 mmol/L   URINALYSIS CULTURE, IF INDICATED   Result Value Ref Range    Color DK Yellow     Character Clear     Ph 5.5 5.0 - 8.0    Glucose Negative Negative mg/dL    Ketones Negative Negative mg/dL    Protein Negative Negative mg/dL    Bilirubin Small (A) Negative    Urobilinogen, Urine 1.0 Negative    Nitrite Negative Negative    Leukocyte Esterase Trace (A) Negative    Occult Blood Negative Negative    Micro Urine Req Microscopic    COD (ADULT)   Result Value Ref Range    ABO Grouping Only B     Rh Grouping Only POS     Antibody Screen-Cod NEG    Prothrombin Time   Result Value Ref Range    PT 13.9 12.0 - 14.6 sec    INR 1.04 0.87 - 1.13   APTT   Result Value Ref Range    APTT 25.7 24.7 - 36.0 sec   BETA-HCG QUALITATIVE SERUM   Result Value Ref Range    Beta-Hcg Qualitative Serum Negative Negative   ESTIMATED GFR   Result Value Ref Range    GFR If African American >60 >60 mL/min/1.73 m 2    GFR If Non African American >60 >60 mL/min/1.73 m 2   REFRACTOMETER SG   Result Value Ref Range    Specific Gravity >1.045    URINE MICROSCOPIC (W/UA)   Result Value Ref Range    WBC 10-20 (A) /hpf    RBC 0-2 /hpf    Bacteria Moderate (A) None /hpf    Epithelial Cells Moderate (A) /hpf    Mucous Threads Few /hpf     Hyaline Cast 6-10 (A) /lpf   EKG (NOW)   Result Value Ref Range    Report       [facility] Emergency Dept.    Test Date:  2019  Pt Name:    SERENE TYLER                 Department: ER  MRN:        [id number]                      Room:        13  Gender:     Female                       Technician: [id number]  :        2000                   Requested By:DREAD PICKARD  Order #:    [id number]                    Reading MD: Dread Pickard MD    Measurements  Intervals                                Axis  Rate:       105                          P:          53  ME:         132                          QRS:        54  QRSD:       90                           T:          31  QT:         352  QTc:        466    Interpretive Statements  SINUS TACHYCARDIA  BASELINE WANDER IN LEAD(S) V2  Compared to ECG 2019 00:23:06  Myocardial infarct finding no longer present  No STEMI or strain or dysrhythmia  Electronically Signed On 2019 7:02:29 PDT by Dread Pickard MD         RADIOLOGY  CT-ABDOMEN-PELVIS WITH   Final Result         1.  Hepatic steatosis.   2.  Mild hepatosplenomegaly.   3.  No acute inflammatory abnormality               DX-CHEST-PORTABLE (1 VIEW)   Final Result      No acute cardiopulmonary abnormality.        The radiologist's interpretations of all radiological studies have been reviewed by me.          COURSE & MEDICAL DECISION MAKING    This is a 18 y.o. female who presents with abdominal pain nausea vomiting and diarrhea occasionally with bloody output on and off symptoms for the last 2 weeks.    Differential Diagnosis includes but is not limited to:  Anemia, GI bleed, viral syndrome, inflammatory bowel disease, peptic ulcer disease.    ED Course:  12:26 AM Patient presents to the ED with nausea, vomiting, and abdominal pain. Patient will be treated with Haldol 5 mg and Pepcid 20 mg. The patient will receive IV fluids for concerns of dehydration. Ordered for  CT-Abdomen/Pelvis, DX-Chest (1 view), CBC with differential, CMP, Lipase, Lactic Acid, Urinalysis, PTT, APTT, COD, and an EKG to evaluate her symptoms.     Thankfully the patient's work-up is reassuring.  Her EKG shows sinus tachycardia but no STEMI or strain or dysrhythmia.  No fevers white count normal doubt serious infection.  She is not anemic.  Electrolytes are stable without acidosis.  LFTs and lipase are reassuring doubt acute hepatobiliary disease.  Lactic acid level is normal doubt ischemic gut.  Urinalysis shows some markers of infection.  She is not pregnant doubt ectopic or IUP.  CT scan reassuring no evidence of any acute surgical disease.    3:12 AM - Patient was reevaluated at bedside. She is resting in bed with stable vital signs. The patient reports to feel improved after IV fluids, thus having positive response to parenteral hydration. Discussed lab and radiology results with the patient which are reassuring. She was updated with plan for rectal exam. The patient is understanding and consents.    3:13 AM - Rectal exam performed at this time as outlined above in presence of female RN chaperone. She was informed she can be discharged home. The patient will be discharged home with prescriptions for Prilosec, Macrobid for UTI, and Hydrocortisone cream to apply to hemorrhoids. She is recommended to follow up with her PCP, but should return to the ED for worsening pain or any other concerning symptoms.     Medications   LR infusion (bolus) (0 mL Intravenous Stopped 8/9/19 0235)   haloperidol lactate (HALDOL) injection 5 mg (5 mg Intravenous Given 8/9/19 0042)   famotidine (PEPCID) injection 20 mg (20 mg Intravenous Given 8/9/19 0042)   iohexol (OMNIPAQUE) 350 mg/mL (100 mL Intravenous Given 8/9/19 0212)     FINAL IMPRESSION  1. Acute abdominal pain    2. Nausea vomiting and diarrhea    3. Hematemesis with nausea    4. Hematochezia    5. External hemorrhoid    6. Acute cystitis without hematuria    7.  Bipolar affective disorder, remission status unspecified (HCC)    8. Tobacco abuse    9. Anxiety    10. History of self-harm    11. Class 3 severe obesity due to excess calories without serious comorbidity with body mass index (BMI) of 50.0 to 59.9 in adult (HCC)        PRESCRIPTIONS  Discharge Medication List as of 8/9/2019  3:24 AM      START taking these medications    Details   omeprazole (PRILOSEC) 40 MG delayed-release capsule Take 1 Cap by mouth every day for 14 days., Disp-14 Cap, R-0, Print Rx Paper      nitrofurantoin monohyd macro (MACROBID) 100 MG Cap Take 1 Cap by mouth 2 times a day for 5 days., Disp-10 Cap, R-0, Print Rx Paper      hydrocortisone rectal (ANUSOL-HC) 2.5 % Cream Apply 1-2 x/day to external hemorrhoids for 7 days, Disp-30 g, R-0, Print Rx Paper             FOLLOW UP  Nils Lambert M.D.  [address] [id number] [phone]    Schedule an appointment as soon as possible for a visit in 2 days      [facility], Emergency Dept  [address] [facility] [address] [phone]  Today  If you have ANY new or worse symptoms!    [address] [address] [phone]        -DISCHARGE-     The patient is referred to a primary physician for blood pressure management, diabetic screening, and for all other preventative health concerns.     Pertinent Labs & Imaging studies reviewed and verified by myself, as well as nursing notes and the patient's past medical, family, and social histories (See chart for details).    Results, exam findings, clinical impression, presumed diagnosis, treatment options, and strict return precautions were discussed with the patient, and they verbalized understanding, agreed with, and appreciated the plan of care.    Licha VALERA (Balaji), am scribing for, and in the presence of, Dread Lewis M.D..    Electronically signed by: Licha Jerez), 8/9/2019    SOTO  Dread Lewis M.D. personally performed the services described in this documentation, as scribed by Licha Avitia in my presence, and it is both accurate and complete.    C.    Portions of this record were made with voice recognition software.  Despite my review, spelling/grammar/context errors may still remain.  Interpretation of this chart should be taken in this context.    The note accurately reflects work and decisions made by me.  Dread Lewis  8/9/2019  7:05 AM

## 2019-08-09 NOTE — ED NOTES
Reviewed DC instructions with pt. Provided prescriptions X3. Pt verbalized understanding. DC'd IV, catheter intact. Pt ambulatory with steady gait to lobby be transported home by friend. VSS on room air. Pt A/Ox4 leaving unit. Abdominal pain resolved.

## 2019-08-09 NOTE — ED TRIAGE NOTES
"Mitzi Abel   18 y.o. female   Chief Complaint   Patient presents with   • N/V     says she has been throwing up and blood in stool for two weeks   • Blood in Vomit   • Abdominal Cramping     started tonight   • Bloody Stools      Pt amb to triage with steady gait for above complaint. Pt voice is very hoarse, she said this is off and on. Reports she has had the bloody stools and vomit for two weeks.      Pt is alert and oriented, speaking in full sentences, follows commands and responds appropriately to questions. NAD. Resp are even and unlabored.   Pt placed in lobby. Pt educated on triage process. Pt encouraged to alert staff for any changes.    /95   Pulse (!) 112   Temp 36.8 °C (98.2 °F) (Temporal)   Resp 18   Ht 1.626 m (5' 4\")   Wt (!) 152 kg (335 lb 1.6 oz)   SpO2 96%   BMI 57.52 kg/m²     "

## 2019-08-09 NOTE — DISCHARGE INSTRUCTIONS
You were seen and evaluated in the Emergency Department at [facility] for:     Abdominal pain and vomiting and diarrhea and bloody output and painful urination    You had the following tests and studies:    Thankfully her work-up today is reassuring, you do have evidence of a urine infection    You received the following medications:    Nausea medication, IV fluids    You received the following prescriptions:    Omeprazole to decrease stomach acid level  Hydrocortisone cream to apply to your hemorrhoids for symptom relief  Nitrofurantoin and antibiotic to treat her urine infection  ----------------------------    Please make sure to follow up with:    Your primary care provider or [facility] for recheck and routine health care, but if you have any worsening pain or fevers or vomiting or any other new or worsening symptoms please return to the ER immediately.    Good luck, we hope you get better soon!  ----------------------------    We always encourage patients to return IMMEDIATELY if they have:  Increased or changing pain, passing out, fevers over 100.4 (taken in your mouth or rectally) for more than 2 days, redness or swelling of skin or tissues, feeling like your heart is beating fast, chest pain that is new or worsening, trouble breathing, feeling like your throat is closing up and can not breath, inability to walk, weakness of any part of your body, new dizziness, severe bleeding that won't stop from any part of your body, if you can't eat or drink, or if you have any other concerns.   If you feel worse, please know that you can always return with any questions, concerns, worse symptoms, or you are feeling unsafe. We certainly cannot say for sure that we have ruled out every illness or dangerous disease, but we feel that at this specific time, your exam, tests, and vital signs like heart rate and blood pressure are safe for discharge.

## 2019-08-11 LAB
BACTERIA UR CULT: NORMAL
SIGNIFICANT IND 70042: NORMAL
SITE SITE: NORMAL
SOURCE SOURCE: NORMAL

## 2019-08-15 ENCOUNTER — HOSPITAL ENCOUNTER (EMERGENCY)
Facility: MEDICAL CENTER | Age: 19
End: 2019-08-15
Attending: EMERGENCY MEDICINE
Payer: MEDICAID

## 2019-08-15 ENCOUNTER — APPOINTMENT (OUTPATIENT)
Dept: RADIOLOGY | Facility: MEDICAL CENTER | Age: 19
End: 2019-08-15
Attending: EMERGENCY MEDICINE
Payer: MEDICAID

## 2019-08-15 VITALS
SYSTOLIC BLOOD PRESSURE: 126 MMHG | HEIGHT: 64 IN | BODY MASS INDEX: 50.02 KG/M2 | TEMPERATURE: 98.9 F | RESPIRATION RATE: 18 BRPM | WEIGHT: 293 LBS | OXYGEN SATURATION: 96 % | DIASTOLIC BLOOD PRESSURE: 75 MMHG | HEART RATE: 104 BPM

## 2019-08-15 DIAGNOSIS — I88.9 LYMPHADENITIS: ICD-10-CM

## 2019-08-15 DIAGNOSIS — N93.8 DYSFUNCTIONAL UTERINE BLEEDING: ICD-10-CM

## 2019-08-15 LAB
ALBUMIN SERPL BCP-MCNC: 4 G/DL (ref 3.2–4.9)
ALBUMIN/GLOB SERPL: 0.9 G/DL
ALP SERPL-CCNC: 92 U/L (ref 30–99)
ALT SERPL-CCNC: 22 U/L (ref 2–50)
ANION GAP SERPL CALC-SCNC: 9 MMOL/L (ref 0–11.9)
AST SERPL-CCNC: 15 U/L (ref 12–45)
BASOPHILS # BLD AUTO: 0.5 % (ref 0–1.8)
BASOPHILS # BLD: 0.05 K/UL (ref 0–0.12)
BILIRUB SERPL-MCNC: 1.1 MG/DL (ref 0.1–1.5)
BUN SERPL-MCNC: 8 MG/DL (ref 8–22)
CALCIUM SERPL-MCNC: 9.7 MG/DL (ref 8.5–10.5)
CHLORIDE SERPL-SCNC: 105 MMOL/L (ref 96–112)
CO2 SERPL-SCNC: 24 MMOL/L (ref 20–33)
CREAT SERPL-MCNC: 0.74 MG/DL (ref 0.5–1.4)
EKG IMPRESSION: NORMAL
EOSINOPHIL # BLD AUTO: 0.03 K/UL (ref 0–0.51)
EOSINOPHIL NFR BLD: 0.3 % (ref 0–6.9)
ERYTHROCYTE [DISTWIDTH] IN BLOOD BY AUTOMATED COUNT: 46 FL (ref 35.9–50)
GLOBULIN SER CALC-MCNC: 4.3 G/DL (ref 1.9–3.5)
GLUCOSE SERPL-MCNC: 97 MG/DL (ref 65–99)
HCG SERPL QL: NEGATIVE
HCT VFR BLD AUTO: 46.5 % (ref 37–47)
HGB BLD-MCNC: 14.8 G/DL (ref 12–16)
IMM GRANULOCYTES # BLD AUTO: 0.02 K/UL (ref 0–0.11)
IMM GRANULOCYTES NFR BLD AUTO: 0.2 % (ref 0–0.9)
LYMPHOCYTES # BLD AUTO: 1.91 K/UL (ref 1–4.8)
LYMPHOCYTES NFR BLD: 20.5 % (ref 22–41)
MCH RBC QN AUTO: 28.7 PG (ref 27–33)
MCHC RBC AUTO-ENTMCNC: 31.8 G/DL (ref 33.6–35)
MCV RBC AUTO: 90.1 FL (ref 81.4–97.8)
MONOCYTES # BLD AUTO: 0.75 K/UL (ref 0–0.85)
MONOCYTES NFR BLD AUTO: 8.1 % (ref 0–13.4)
NEUTROPHILS # BLD AUTO: 6.55 K/UL (ref 2–7.15)
NEUTROPHILS NFR BLD: 70.4 % (ref 44–72)
NRBC # BLD AUTO: 0 K/UL
NRBC BLD-RTO: 0 /100 WBC
PLATELET # BLD AUTO: 236 K/UL (ref 164–446)
PMV BLD AUTO: 10.4 FL (ref 9–12.9)
POTASSIUM SERPL-SCNC: 4 MMOL/L (ref 3.6–5.5)
PROT SERPL-MCNC: 8.3 G/DL (ref 6–8.2)
RBC # BLD AUTO: 5.16 M/UL (ref 4.2–5.4)
SODIUM SERPL-SCNC: 138 MMOL/L (ref 135–145)
WBC # BLD AUTO: 9.3 K/UL (ref 4.8–10.8)

## 2019-08-15 PROCEDURE — 99284 EMERGENCY DEPT VISIT MOD MDM: CPT

## 2019-08-15 PROCEDURE — 84703 CHORIONIC GONADOTROPIN ASSAY: CPT

## 2019-08-15 PROCEDURE — 36415 COLL VENOUS BLD VENIPUNCTURE: CPT

## 2019-08-15 PROCEDURE — 80053 COMPREHEN METABOLIC PANEL: CPT

## 2019-08-15 PROCEDURE — 93005 ELECTROCARDIOGRAM TRACING: CPT | Performed by: EMERGENCY MEDICINE

## 2019-08-15 PROCEDURE — 76856 US EXAM PELVIC COMPLETE: CPT

## 2019-08-15 PROCEDURE — 93005 ELECTROCARDIOGRAM TRACING: CPT

## 2019-08-15 PROCEDURE — 85025 COMPLETE CBC W/AUTO DIFF WBC: CPT

## 2019-08-15 RX ORDER — CLINDAMYCIN HYDROCHLORIDE 300 MG/1
300 CAPSULE ORAL 4 TIMES DAILY
Qty: 28 CAP | Refills: 0 | Status: SHIPPED | OUTPATIENT
Start: 2019-08-15 | End: 2019-08-22

## 2019-08-15 NOTE — ED TRIAGE NOTES
"Ambulates to triage after an EKG  Chief Complaint   Patient presents with   • Vaginal Bleeding   • Cramping   • Leg Pain     Pt has multiple complaints, vaginal bleeding started yesterday, LMP was the end of July, pt has been regular with her periods, today the bleeding is much worse.  Pt c/o of cramping around her umbilicus that comes and goes, \"stabbing on the L side.\"  Also c/o of bilateral upper leg pain and weakness, \"around my hips and the front of my thighs.\"    "

## 2019-08-16 NOTE — ED PROVIDER NOTES
ED Provider Note    Scribed for Michael Alvarez M.D. by Jerry Patterson. 8/15/2019  5:48 PM    Primary care provider: Nils Lambert M.D.  Means of arrival: Walk-in  History obtained from: patient  History limited by: None    CHIEF COMPLAINT  Chief Complaint   Patient presents with   • Vaginal Bleeding   • Cramping   • Leg Pain       HPI  Mitzi Abel is a 18 y.o. female who presents to the Emergency Department for evaluation of vaginal bleeding, onset two weeks ag. She states that she has experienced this for the past 2 weeks, but the bleeding has been progressively worsening prompting her visit today. She states that her last menstrual period was at the end of July. Prior to this she notes abnormalities stating that she didn't have a period for the period of October to May. She notes that the vaginal bleeding is exacerbated by sitting on the toilet. She notes associated cramping, chills, and leg pain. The cramping is moderate in severity and in genrally around her umbilical area. She denies dysuria or discharge. The patient reports being sexually active. She had previously been to this ED for evaluation of acid reflux bladder infection, and hemorrhoids.     The patient adds that her jaw hurts and she has been losing her voice for the past month.  Patient is complaining over pain in her left anterior neck       REVIEW OF SYSTEMS  Pertinent positives include cramping, vaginal bleeding, chills, leg pain, hoarse voice, and jaw pain. Pertinent negatives include no dysuria or vaginal discharge.  All other systems reviewed and negative.    PAST MEDICAL HISTORY   has a past medical history of Anxiety, Bipolar 2 disorder, Borderline personality disorder (HCC), Eczema (10/15/2010), H/O: Bell's palsy (12/26/08), Major depressive disorder, Myopia (10/15/2010), and Obesity.    SURGICAL HISTORY  patient denies any surgical history    SOCIAL HISTORY  Social History     Tobacco Use   • Smoking status: Current Every  "Day Smoker     Packs/day: 0.50     Years: 11.00     Pack years: 5.50     Types: Cigarettes   • Smokeless tobacco: Never Used   Substance Use Topics   • Alcohol use: Yes     Comment: occ   • Drug use: Not Currently     Comment: Hx of meth. last use 5/2017      Social History     Substance and Sexual Activity   Drug Use Not Currently    Comment: Hx of meth. last use 5/2017       FAMILY HISTORY  Family History   Problem Relation Age of Onset   • Diabetes Mother         Gestational DM   • Psychiatric Illness Mother         depression   • Arthritis Mother    • Hypertension Father    • Alcohol/Drug Father    • Psychiatric Illness Father         bipolar   • Thyroid Paternal Grandmother    • Diabetes Paternal Grandfather    • Heart Disease Paternal Grandfather    • Hypertension Paternal Grandfather    • Cancer Paternal Grandfather         prostate   • Psychiatric Illness Brother         Bipolar/Bipolar   • Arthritis Maternal Grandfather        CURRENT MEDICATIONS  Home Medications     Reviewed by Radha Rodriguez R.N. (Registered Nurse) on 08/15/19 at 1545  Med List Status: Partial   Medication Last Dose Status   Albuterol Sulfate 108 (90 Base) MCG/ACT AEROSOL POWDER, BREATH ACTIVATED  Active   fluconazole (DIFLUCAN) 150 MG tablet 8/11/2019 Active   hydrocortisone rectal (ANUSOL-HC) 2.5 % Cream  Active   omeprazole (PRILOSEC) 40 MG delayed-release capsule 8/15/2019 Active                ALLERGIES  Allergies   Allergen Reactions   • Penicillins        PHYSICAL EXAM  VITAL SIGNS: /93   Pulse (!) 131   Temp 36.6 °C (97.8 °F) (Temporal)   Resp 18   Ht 1.626 m (5' 4\")   Wt (!) 135.3 kg (298 lb 4.5 oz)   LMP 07/25/2019 (Exact Date)   SpO2 96%   BMI 51.20 kg/m²     Vital signs reviewed.  Constitutional:  Morbidly obese, Appears well-developed and well-nourished. No distress.   Head: Normocephalic.   Mouth/Throat: Hoarse voice, Oropharynx is clear and moist. Tender lymphadenitis in the left anterior cervical " chain  Eyes: EOM are normal. Pupils are equal, round, and reactive to light.   Neck: Normal range of motion. Neck supple.   Cardiovascular: Normal rate, regular rhythm and normal heart sounds.    Pulmonary/Chest: Effort normal and breath sounds normal. No wheezes.   Abdominal: Soft. There is no tenderness. There is no rebound and no guarding.   Musculoskeletal: Exhibits no edema. Negative Homans sign.  No swelling.  No pitting edema.  Lymphadenopathy: Adenopathy in left anterior neck,    Neurological: Patient is alert and oriented to person, place, and time. CNs II - XII intact. DTRs intact. Normal sensation and strength.  Skin: Skin is warm and dry.   Psychiatric: Patient has a normal mood and affect. Behavior is normal.     LABS  Results for orders placed or performed during the hospital encounter of 08/15/19   CBC with Differential   Result Value Ref Range    WBC 9.3 4.8 - 10.8 K/uL    RBC 5.16 4.20 - 5.40 M/uL    Hemoglobin 14.8 12.0 - 16.0 g/dL    Hematocrit 46.5 37.0 - 47.0 %    MCV 90.1 81.4 - 97.8 fL    MCH 28.7 27.0 - 33.0 pg    MCHC 31.8 (L) 33.6 - 35.0 g/dL    RDW 46.0 35.9 - 50.0 fL    Platelet Count 236 164 - 446 K/uL    MPV 10.4 9.0 - 12.9 fL    Neutrophils-Polys 70.40 44.00 - 72.00 %    Lymphocytes 20.50 (L) 22.00 - 41.00 %    Monocytes 8.10 0.00 - 13.40 %    Eosinophils 0.30 0.00 - 6.90 %    Basophils 0.50 0.00 - 1.80 %    Immature Granulocytes 0.20 0.00 - 0.90 %    Nucleated RBC 0.00 /100 WBC    Neutrophils (Absolute) 6.55 2.00 - 7.15 K/uL    Lymphs (Absolute) 1.91 1.00 - 4.80 K/uL    Monos (Absolute) 0.75 0.00 - 0.85 K/uL    Eos (Absolute) 0.03 0.00 - 0.51 K/uL    Baso (Absolute) 0.05 0.00 - 0.12 K/uL    Immature Granulocytes (abs) 0.02 0.00 - 0.11 K/uL    NRBC (Absolute) 0.00 K/uL   Comp Metabolic Panel   Result Value Ref Range    Sodium 138 135 - 145 mmol/L    Potassium 4.0 3.6 - 5.5 mmol/L    Chloride 105 96 - 112 mmol/L    Co2 24 20 - 33 mmol/L    Anion Gap 9.0 0.0 - 11.9    Glucose 97 65 - 99  mg/dL    Bun 8 8 - 22 mg/dL    Creatinine 0.74 0.50 - 1.40 mg/dL    Calcium 9.7 8.5 - 10.5 mg/dL    AST(SGOT) 15 12 - 45 U/L    ALT(SGPT) 22 2 - 50 U/L    Alkaline Phosphatase 92 30 - 99 U/L    Total Bilirubin 1.1 0.1 - 1.5 mg/dL    Albumin 4.0 3.2 - 4.9 g/dL    Total Protein 8.3 (H) 6.0 - 8.2 g/dL    Globulin 4.3 (H) 1.9 - 3.5 g/dL    A-G Ratio 0.9 g/dL   HCG Qual Serum   Result Value Ref Range    Beta-Hcg Qualitative Serum Negative Negative   ESTIMATED GFR   Result Value Ref Range    GFR If African American >60 >60 mL/min/1.73 m 2    GFR If Non African American >60 >60 mL/min/1.73 m 2   EKG (NOW)   Result Value Ref Range    Report       [facility] Emergency Dept.    Test Date:  2019-08-15  Pt Name:    SERENE TYLER                 Department: ER  MRN:        [id number]                      Room:  Gender:     Female                       Technician: [id number]  :        2000                   Requested By:ER TRIAGE PROTOCOL  Order #:    [id number]                    Reading MD:    Measurements  Intervals                                Axis  Rate:       124                          P:          50  GA:         124                          QRS:        85  QRSD:       82                           T:          17  QT:         320  QTc:        460    Interpretive Statements  SINUS TACHYCARDIA  Compared to ECG 2019 00:35:29  No significant changes        All labs reviewed by me.    EKG  12 Lead EKG interpreted by me to show sinus rhythm at 120.  Normal P waves.  Normal QRS.  Normal R wave progression.  Normal ST segments.  Borderline EKG showing sinus tachycardia    RADIOLOGY  US-PELVIC COMPLETE (TRANSABDOMINAL/TRANSVAGINAL) (COMBO)   Final Result      1.  2.5 x 2.2 x 1.9 cm sized left ovarian cyst.      2.  No other pelvic abnormality.        The radiologist's interpretation of all radiological studies have been reviewed by me.    COURSE & MEDICAL DECISION MAKING  Pertinent Labs & Imaging studies  reviewed. (See chart for details) The patient's [facility] and past medical  records were reviewed    5:48 PM - Patient seen and examined at bedside. We discussed the plan of care that includes antibiotics for her neck and an ultrasound to evaluate her cramping. Ordered US pelvic complete, CBC w/, CMP, HCG qual serum, POC UA, and EKG to evaluate her symptoms. The differential diagnoses include but are not limited to: Dysfunctional uterine bleeding, lymphadenitis, ovarian cyst versus torsion    7:36 PM - Patient was reevaluated at bedside. Discussed lab and radiology results with the patient and informed them of the findings. She was informed of the plan to be placed on antibiotics and discharged shortly.  There is nothing that will need acute follow-up.  Patient will need a primary care doctor.  She requested birth control pills but because of her tobacco abuse and morbid obesity I told the patient she would need to find a primary care physician for birth control.  She will be referred to the [facility] clinic given her multitude of complaints.    The patient will return for new or worsening symptoms and is stable at the time of discharge.    The patient is referred to a primary physician for blood pressure management, diabetic screening, and for all other preventative health concerns.      DISPOSITION:  Patient will be discharged home in stable condition.    FOLLOW UP:  No follow-up provider specified.    OUTPATIENT MEDICATIONS:  New Prescriptions    CLINDAMYCIN (CLEOCIN) 300 MG CAP    Take 1 Cap by mouth 4 times a day for 7 days.     FINAL IMPRESSION  1. Dysfunctional uterine bleeding    2. Lymphadenitis          Jerry VALERA (Balaji), am scribing for, and in the presence of, Michael Alvarez M.D..    Electronically signed by: Jerry Patterson (Balaji), 8/15/2019    Michael VALERA M.D. personally performed the services described in this documentation, as scribed by Jerry Patterson in my presence, and it is  both accurate and complete. C    The note accurately reflects work and decisions made by me.  Michael Alvarez  8/15/2019  8:07 PM

## 2019-08-16 NOTE — ED NOTES
Reviewed DC instructions with pt. Provided prescriptions X1. Pt verbalized understanding.  Pt ambulatory with steady gait to lobby be transported home by sister. VSS on room air. Pt A/Ox4 leaving unit/.

## 2019-10-04 ENCOUNTER — HOSPITAL ENCOUNTER (EMERGENCY)
Facility: MEDICAL CENTER | Age: 19
End: 2019-10-04
Attending: EMERGENCY MEDICINE
Payer: MEDICAID

## 2019-10-04 VITALS
HEIGHT: 64 IN | WEIGHT: 290.35 LBS | SYSTOLIC BLOOD PRESSURE: 133 MMHG | BODY MASS INDEX: 49.57 KG/M2 | OXYGEN SATURATION: 96 % | DIASTOLIC BLOOD PRESSURE: 67 MMHG | TEMPERATURE: 96.8 F | RESPIRATION RATE: 16 BRPM | HEART RATE: 118 BPM

## 2019-10-04 DIAGNOSIS — K08.89 PAIN, DENTAL: ICD-10-CM

## 2019-10-04 PROCEDURE — A9270 NON-COVERED ITEM OR SERVICE: HCPCS | Performed by: EMERGENCY MEDICINE

## 2019-10-04 PROCEDURE — 99283 EMERGENCY DEPT VISIT LOW MDM: CPT

## 2019-10-04 PROCEDURE — 700102 HCHG RX REV CODE 250 W/ 637 OVERRIDE(OP): Performed by: EMERGENCY MEDICINE

## 2019-10-04 RX ORDER — CLINDAMYCIN HYDROCHLORIDE 150 MG/1
300 CAPSULE ORAL ONCE
Status: COMPLETED | OUTPATIENT
Start: 2019-10-04 | End: 2019-10-04

## 2019-10-04 RX ORDER — CLINDAMYCIN HYDROCHLORIDE 300 MG/1
300 CAPSULE ORAL 4 TIMES DAILY
Qty: 28 CAP | Refills: 0 | Status: SHIPPED | OUTPATIENT
Start: 2019-10-04 | End: 2019-10-11

## 2019-10-04 RX ORDER — OXYCODONE HYDROCHLORIDE AND ACETAMINOPHEN 5; 325 MG/1; MG/1
1-2 TABLET ORAL EVERY 4 HOURS PRN
Qty: 10 TAB | Refills: 0 | Status: SHIPPED | OUTPATIENT
Start: 2019-10-04 | End: 2019-10-09

## 2019-10-04 RX ORDER — OXYCODONE HYDROCHLORIDE AND ACETAMINOPHEN 5; 325 MG/1; MG/1
2 TABLET ORAL ONCE
Status: COMPLETED | OUTPATIENT
Start: 2019-10-04 | End: 2019-10-04

## 2019-10-04 RX ORDER — IBUPROFEN 600 MG/1
600 TABLET ORAL EVERY 6 HOURS PRN
Qty: 20 TAB | Refills: 0 | Status: ON HOLD | OUTPATIENT
Start: 2019-10-04 | End: 2020-07-15

## 2019-10-04 RX ADMIN — CLINDAMYCIN HYDROCHLORIDE 300 MG: 150 CAPSULE ORAL at 05:22

## 2019-10-04 RX ADMIN — OXYCODONE HYDROCHLORIDE AND ACETAMINOPHEN 2 TABLET: 5; 325 TABLET ORAL at 05:22

## 2019-10-04 ASSESSMENT — LIFESTYLE VARIABLES
TOTAL SCORE: 0
CONSUMPTION TOTAL: INCOMPLETE
HAVE YOU EVER FELT YOU SHOULD CUT DOWN ON YOUR DRINKING: NO
TOTAL SCORE: 0
EVER HAD A DRINK FIRST THING IN THE MORNING TO STEADY YOUR NERVES TO GET RID OF A HANGOVER: NO
DO YOU DRINK ALCOHOL: NO
DOES PATIENT WANT TO STOP DRINKING: NO
HAVE PEOPLE ANNOYED YOU BY CRITICIZING YOUR DRINKING: NO
TOTAL SCORE: 0
EVER FELT BAD OR GUILTY ABOUT YOUR DRINKING: NO

## 2019-10-04 NOTE — ED NOTES
Patient verbalized understanding of discharge instructions, provided with discharge paperwork, gait steady, ambulated independently to [facility] santana.

## 2019-10-04 NOTE — ED NOTES
Patient awake alert and oriented x 4, Glascow 15, bed in low position, call light within reach, on room air, attached to cardiac monitor, unlabored breathing noted, no cough noted, interacts with staff, interactions noted as appropriate, Dr. Zelaya at bedside at this time.

## 2019-10-04 NOTE — ED TRIAGE NOTES
"Chief Complaint   Patient presents with   • Jaw Pain     Patient ambulatory to triage, c/o \"my jaw hurts, its fucking killing me, and my tooth, i can't chew, its so bad\" states the pain has been going on for approx 1 week \"its so bad\"   • Tooth Ache     /80   Pulse (!) 120   Temp 36 °C (96.8 °F) (Temporal)   Resp 16   Ht 1.626 m (5' 4\")   Wt (!) 131.7 kg (290 lb 5.5 oz)     Patient placed back in ed lobby, educated on ed triage process, instructed to notify staff of any new or worsening symptoms.   "

## 2019-10-04 NOTE — ED PROVIDER NOTES
"ED Provider Note    Scribed for Regan Zelaya M.D. by Angeles Marie. 10/4/2019, 5:13 AM.    Primary care provider: None noted  Means of arrival: Walk-in  History obtained from: Patient  History limited by: None    CHIEF COMPLAINT  Chief Complaint   Patient presents with   • Jaw Pain     Patient ambulatory to triage, c/o \"my jaw hurts, its fucking killing me, and my tooth, i can't chew, its so bad\" states the pain has been going on for approx 1 week \"its so bad\"   • Tooth Ache       HPI  Mitzi Abel is a 18 y.o. female who presents to the Emergency Department for evaluation of acute, constant, moderate left lower tooth pain onset one week ago. The patient reports that her pain is mainly located on her left lower tooth and radiates to the left side of the jaw and down the left side of her neck. She notes that she developed her tooth pain a few days ago and that her pain has not resolved since onset, which prompted her to visit the ED today. Exacerbating factors include movement of the head. No alleviating factors were reported. She does not report taking any over the counter medications for pain control. Negative bleeding from the gums. The patient does not report taking any daily medications and states that she is allergic to Penicillin.     REVIEW OF SYSTEMS  Pertinent positives include left lower tooth pain, left jaw pain, left neck pain.   Pertinent negatives include no bleeding from the gums.       PAST MEDICAL HISTORY   has a past medical history of Anxiety, Bipolar 2 disorder, Borderline personality disorder (HCC), Eczema (10/15/2010), H/O: Bell's palsy (12/26/08), Major depressive disorder, Myopia (10/15/2010), and Obesity.    SURGICAL HISTORY  patient denies any surgical history    SOCIAL HISTORY  Social History     Tobacco Use   • Smoking status: Current Every Day Smoker     Packs/day: 0.50     Years: 11.00     Pack years: 5.50     Types: Cigarettes   • Smokeless tobacco: Never Used   Substance " "Use Topics   • Alcohol use: Yes     Comment: occ   • Drug use: Not Currently     Comment: Hx of meth. last use 5/2017      Social History     Substance and Sexual Activity   Drug Use Not Currently    Comment: Hx of meth. last use 5/2017       FAMILY HISTORY  Family History   Problem Relation Age of Onset   • Diabetes Mother         Gestational DM   • Psychiatric Illness Mother         depression   • Arthritis Mother    • Hypertension Father    • Alcohol/Drug Father    • Psychiatric Illness Father         bipolar   • Thyroid Paternal Grandmother    • Diabetes Paternal Grandfather    • Heart Disease Paternal Grandfather    • Hypertension Paternal Grandfather    • Cancer Paternal Grandfather         prostate   • Psychiatric Illness Brother         Bipolar/Bipolar   • Arthritis Maternal Grandfather        CURRENT MEDICATIONS  The patient denies taking any daily medications    ALLERGIES  Allergies   Allergen Reactions   • Penicillins        PHYSICAL EXAM  VITAL SIGNS: /80   Pulse (!) 115   Temp 36 °C (96.8 °F) (Temporal)   Resp 16   Ht 1.626 m (5' 4\")   Wt (!) 131.7 kg (290 lb 5.5 oz)   SpO2 96%   BMI 49.84 kg/m²   Constitutional: Awake, alert, in no acute distress, Non-toxic appearance. Morbidly obese. Mild distress secondary to pain  HENT: Mucus membranes moist.  Oropharynx is clear. Mildly receding gum line. Tongue is normal.  Floor of the mouth is normal.  Submental space is soft.  Posterior pharynx is normal.  Patient is tolerating secretions without difficulty. There is no evidence of Cm's Angina.  Eyes: PERRL, EOMI, conjunctiva moist, noninjected.  Neck: Nontender, Normal range of motion, No nuchal rigidity, No stridor.   Lymphatic: No lymphadenopathy noted.   Extremities: No edema, No tenderness.   Skin: Warm, Dry, No rashes.   Neurologic: Alert, sensory and motor function normal. No focal deficits.   Psychiatric: Affect normal, Judgment normal, Mood normal. Appropriate for clinical " situation      COURSE & MEDICAL DECISION MAKING  Nursing notes, VS, PMSFHx reviewed in chart.    Review of past medical records shows the patient was last seen at this facility on 8/15/2019 for evaluation of vaginal bleeding.      5:13 AM - Patient seen and examined at bedside. I informed the patient that her dental pain is likely secondary to a bacterial infection and that she will be discharged with antibiotics. The patient is stable for discharge. She was given discharge instructions which includes taking her medications are prescribed, applying ice to the affected area, using Tylenol and Ibuprofen for pain control and gargling the mouth with warm salt-water. The patient will be discharged with a prescription for Cleocin 300 mg which is to be taken four times day for the next seven days, Motrin 600 mg which is to be taken every six hours as needed and Percocet 5-325 mg which is to be taken up to every four hours as needed. She was instructed to immediately return to the ED if her symptoms worsen. Patient verbalizes their understanding and agreement to plan of discharge.     In prescribing controlled substances to this patient, I certify that I have obtained and reviewed the medical history of Mitzi Abel. I have also made a good hector effort to obtain applicable records from other providers who have treated the patient and records did not demonstrate any increased risk of substance abuse that would prevent me from prescribing controlled substances.     I have conducted a physical exam and documented it. I have reviewed Ms. Abel’s prescription history as maintained by the [place] Prescription Monitoring Program.     I have assessed the patient’s risk for abuse, dependency, and addiction using the validated Opioid Risk Tool available at https://www.mdcalc.com/vmztim-hwht-kulg-ort-narcotic-abuse.     Given the above, I believe the benefits of controlled substance therapy outweigh the risks. The reasons  for prescribing controlled substances include non-narcotic, oral analgesic alternatives have been inadequate for pain control. Accordingly, I have discussed the risk and benefits, treatment plan, and alternative therapies with the patient.                   No Value Found: No Data Found.(Value from Balanced System.)  NARxSCORES can range from 000 to 999. This first two digits represent the composite percentile risk based on an overall analysis of prescription drug use. The third digit represents the number of active prescriptions. The distribution of scores in the population is such that approximately 75% fall below 200, 95% fall below 500 and 99% fall below 650.     SELECT THE LINK BELOW TO REVIEW THE [place] REPORT  or  SELECT THE 'ACCEPT' BUTTON TO CONTINUTE AFTER REVIEWING THE SCORES              DISPOSITION:  Patient will be discharged home in stable condition.    FOLLOW UP:  [facility], Emergency Dept  [address] [place] [id number] [phone]    If symptoms worsen      OUTPATIENT MEDICATIONS:  New Prescriptions    CLINDAMYCIN (CLEOCIN) 300 MG CAP    Take 1 Cap by mouth 4 times a day for 7 days.    IBUPROFEN (MOTRIN) 600 MG TAB    Take 1 Tab by mouth every 6 hours as needed.    OXYCODONE-ACETAMINOPHEN (PERCOCET) 5-325 MG TAB    Take 1-2 Tabs by mouth every four hours as needed for up to 5 days.       The patient was discharged home with an information sheet on dental pain and told to return immediately for any signs or symptoms listed.  The patient agreed to the discharge precautions and follow-up plan which is documented in EPIC.    FINAL IMPRESSION  1. Pain, dental          Angeles VALERA (Balaji), am scribing for, and in the presence of, Regan Zelaya M.D..    Electronically signed by: Angeles Marie (Balaji), 10/4/2019    Regan VALERA M.D. personally performed the services described in this documentation, as scribed by Angeles Marie in my presence, and it is both  accurate and complete.    E    The note accurately reflects work and decisions made by me.  Regan Zelaya  10/4/2019  11:17 AM

## 2019-10-20 ENCOUNTER — APPOINTMENT (OUTPATIENT)
Dept: RADIOLOGY | Facility: MEDICAL CENTER | Age: 19
End: 2019-10-20
Attending: EMERGENCY MEDICINE
Payer: MEDICAID

## 2019-10-20 ENCOUNTER — HOSPITAL ENCOUNTER (EMERGENCY)
Facility: MEDICAL CENTER | Age: 19
End: 2019-10-20
Attending: EMERGENCY MEDICINE
Payer: MEDICAID

## 2019-10-20 VITALS
RESPIRATION RATE: 16 BRPM | BODY MASS INDEX: 49.96 KG/M2 | HEIGHT: 63 IN | TEMPERATURE: 98.7 F | OXYGEN SATURATION: 98 % | HEART RATE: 91 BPM | WEIGHT: 281.97 LBS | DIASTOLIC BLOOD PRESSURE: 65 MMHG | SYSTOLIC BLOOD PRESSURE: 112 MMHG

## 2019-10-20 DIAGNOSIS — R11.2 NAUSEA AND VOMITING, INTRACTABILITY OF VOMITING NOT SPECIFIED, UNSPECIFIED VOMITING TYPE: ICD-10-CM

## 2019-10-20 DIAGNOSIS — M54.50 ACUTE LOW BACK PAIN, UNSPECIFIED BACK PAIN LATERALITY, UNSPECIFIED WHETHER SCIATICA PRESENT: ICD-10-CM

## 2019-10-20 DIAGNOSIS — N83.201 CYST OF RIGHT OVARY: ICD-10-CM

## 2019-10-20 LAB
ALBUMIN SERPL BCP-MCNC: 3.9 G/DL (ref 3.2–4.9)
ALBUMIN/GLOB SERPL: 1.1 G/DL
ALP SERPL-CCNC: 78 U/L (ref 30–99)
ALT SERPL-CCNC: 21 U/L (ref 2–50)
AMPHET UR QL SCN: POSITIVE
ANION GAP SERPL CALC-SCNC: 8 MMOL/L (ref 0–11.9)
APPEARANCE UR: ABNORMAL
AST SERPL-CCNC: 16 U/L (ref 12–45)
BACTERIA #/AREA URNS HPF: ABNORMAL /HPF
BARBITURATES UR QL SCN: NEGATIVE
BASOPHILS # BLD AUTO: 0.2 % (ref 0–1.8)
BASOPHILS # BLD: 0.01 K/UL (ref 0–0.12)
BENZODIAZ UR QL SCN: NEGATIVE
BILIRUB SERPL-MCNC: 1 MG/DL (ref 0.1–1.5)
BILIRUB UR QL STRIP.AUTO: NEGATIVE
BUN SERPL-MCNC: 7 MG/DL (ref 8–22)
BZE UR QL SCN: NEGATIVE
CALCIUM SERPL-MCNC: 9.2 MG/DL (ref 8.5–10.5)
CANNABINOIDS UR QL SCN: NEGATIVE
CAOX CRY #/AREA URNS HPF: ABNORMAL /HPF
CHLORIDE SERPL-SCNC: 107 MMOL/L (ref 96–112)
CO2 SERPL-SCNC: 22 MMOL/L (ref 20–33)
COLOR UR: YELLOW
CREAT SERPL-MCNC: 0.56 MG/DL (ref 0.5–1.4)
EOSINOPHIL # BLD AUTO: 0.07 K/UL (ref 0–0.51)
EOSINOPHIL NFR BLD: 1.4 % (ref 0–6.9)
EPI CELLS #/AREA URNS HPF: ABNORMAL /HPF
ERYTHROCYTE [DISTWIDTH] IN BLOOD BY AUTOMATED COUNT: 40.9 FL (ref 35.9–50)
GLOBULIN SER CALC-MCNC: 3.4 G/DL (ref 1.9–3.5)
GLUCOSE SERPL-MCNC: 102 MG/DL (ref 65–99)
GLUCOSE UR STRIP.AUTO-MCNC: NEGATIVE MG/DL
HCG SERPL QL: NEGATIVE
HCT VFR BLD AUTO: 43.4 % (ref 37–47)
HGB BLD-MCNC: 14.6 G/DL (ref 12–16)
HYALINE CASTS #/AREA URNS LPF: ABNORMAL /LPF
IMM GRANULOCYTES # BLD AUTO: 0.01 K/UL (ref 0–0.11)
IMM GRANULOCYTES NFR BLD AUTO: 0.2 % (ref 0–0.9)
KETONES UR STRIP.AUTO-MCNC: NEGATIVE MG/DL
LEUKOCYTE ESTERASE UR QL STRIP.AUTO: ABNORMAL
LIPASE SERPL-CCNC: 20 U/L (ref 11–82)
LYMPHOCYTES # BLD AUTO: 1.3 K/UL (ref 1–4.8)
LYMPHOCYTES NFR BLD: 25.8 % (ref 22–41)
MCH RBC QN AUTO: 30.4 PG (ref 27–33)
MCHC RBC AUTO-ENTMCNC: 33.6 G/DL (ref 33.6–35)
MCV RBC AUTO: 90.2 FL (ref 81.4–97.8)
METHADONE UR QL SCN: NEGATIVE
MICRO URNS: ABNORMAL
MONOCYTES # BLD AUTO: 0.34 K/UL (ref 0–0.85)
MONOCYTES NFR BLD AUTO: 6.8 % (ref 0–13.4)
MUCOUS THREADS #/AREA URNS HPF: ABNORMAL /HPF
NEUTROPHILS # BLD AUTO: 3.3 K/UL (ref 2–7.15)
NEUTROPHILS NFR BLD: 65.6 % (ref 44–72)
NITRITE UR QL STRIP.AUTO: NEGATIVE
NRBC # BLD AUTO: 0 K/UL
NRBC BLD-RTO: 0 /100 WBC
OPIATES UR QL SCN: NEGATIVE
OXYCODONE UR QL SCN: NEGATIVE
PCP UR QL SCN: NEGATIVE
PH UR STRIP.AUTO: 7 [PH] (ref 5–8)
PLATELET # BLD AUTO: 245 K/UL (ref 164–446)
PMV BLD AUTO: 10.7 FL (ref 9–12.9)
POTASSIUM SERPL-SCNC: 3.9 MMOL/L (ref 3.6–5.5)
PROPOXYPH UR QL SCN: NEGATIVE
PROT SERPL-MCNC: 7.3 G/DL (ref 6–8.2)
PROT UR QL STRIP: NEGATIVE MG/DL
RBC # BLD AUTO: 4.81 M/UL (ref 4.2–5.4)
RBC # URNS HPF: ABNORMAL /HPF
RBC UR QL AUTO: NEGATIVE
SODIUM SERPL-SCNC: 137 MMOL/L (ref 135–145)
SP GR UR STRIP.AUTO: 1.01
UROBILINOGEN UR STRIP.AUTO-MCNC: 1 MG/DL
WBC # BLD AUTO: 5 K/UL (ref 4.8–10.8)
WBC #/AREA URNS HPF: ABNORMAL /HPF

## 2019-10-20 PROCEDURE — 700111 HCHG RX REV CODE 636 W/ 250 OVERRIDE (IP): Performed by: EMERGENCY MEDICINE

## 2019-10-20 PROCEDURE — 99284 EMERGENCY DEPT VISIT MOD MDM: CPT

## 2019-10-20 PROCEDURE — 80053 COMPREHEN METABOLIC PANEL: CPT

## 2019-10-20 PROCEDURE — 83690 ASSAY OF LIPASE: CPT

## 2019-10-20 PROCEDURE — 80307 DRUG TEST PRSMV CHEM ANLYZR: CPT

## 2019-10-20 PROCEDURE — 76856 US EXAM PELVIC COMPLETE: CPT

## 2019-10-20 PROCEDURE — 96374 THER/PROPH/DIAG INJ IV PUSH: CPT

## 2019-10-20 PROCEDURE — 84703 CHORIONIC GONADOTROPIN ASSAY: CPT

## 2019-10-20 PROCEDURE — 81001 URINALYSIS AUTO W/SCOPE: CPT

## 2019-10-20 PROCEDURE — 85025 COMPLETE CBC W/AUTO DIFF WBC: CPT

## 2019-10-20 PROCEDURE — 700105 HCHG RX REV CODE 258: Performed by: EMERGENCY MEDICINE

## 2019-10-20 RX ORDER — ONDANSETRON 2 MG/ML
4 INJECTION INTRAMUSCULAR; INTRAVENOUS ONCE
Status: COMPLETED | OUTPATIENT
Start: 2019-10-20 | End: 2019-10-20

## 2019-10-20 RX ORDER — ONDANSETRON 4 MG/1
4 TABLET, ORALLY DISINTEGRATING ORAL EVERY 8 HOURS PRN
Qty: 10 TAB | Refills: 0 | Status: ON HOLD | OUTPATIENT
Start: 2019-10-20 | End: 2020-07-15

## 2019-10-20 RX ORDER — SODIUM CHLORIDE 9 MG/ML
1000 INJECTION, SOLUTION INTRAVENOUS ONCE
Status: COMPLETED | OUTPATIENT
Start: 2019-10-20 | End: 2019-10-20

## 2019-10-20 RX ADMIN — SODIUM CHLORIDE 1000 ML: 9 INJECTION, SOLUTION INTRAVENOUS at 10:48

## 2019-10-20 RX ADMIN — ONDANSETRON 4 MG: 2 INJECTION INTRAMUSCULAR; INTRAVENOUS at 10:48

## 2019-10-20 NOTE — ED TRIAGE NOTES
"Chief Complaint   Patient presents with   • Flank Pain     bilateral flank pain & N/V x1 day   • N/V     Pt to triage for above. Currently in rehab, has been clean from meth x4 days.     /101   Pulse (!) 104   Temp 37.1 °C (98.7 °F) (Temporal)   Resp 16   Ht 1.6 m (5' 3\")   Wt (!) 127.9 kg (281 lb 15.5 oz)   SpO2 96%   BMI 49.95 kg/m²     "

## 2019-10-20 NOTE — ED NOTES
Assist primary. Pt roomed from triage. Pt ambulated to restroom immediately to provide clean catch urine sample.

## 2019-10-20 NOTE — DISCHARGE INSTRUCTIONS
Please follow-up with a gynecologist regarding the cyst seen on today's ultrasound.    Take Tylenol and/or Motrin as needed for pain    Take Zofran as needed for nausea    Return to the ER for fever, increased pain, vomiting, or other concerns

## 2019-10-20 NOTE — ED NOTES
Pt given discharge instructions/prescription given to staff/ home care instructions explained, pt verbalized understanding of instructions given/pt understands the importance of follow up, pt ambulatory to ER max.

## 2019-10-20 NOTE — ED PROVIDER NOTES
"ED Provider Note    CHIEF COMPLAINT  Chief Complaint   Patient presents with   • Flank Pain     bilateral flank pain & N/V x1 day   • N/V     Patient accompanied by her chaperone from her rehab program    HPI  Mitzi Abel is a 18 y.o. female who presents complaining of bilateral lower back pain which she describes as \"kidney pain.\"  Patient states this began this morning.  She describes the pain as severe, dull and achy.  She denies alleviating or exacerbating factors per she also had nausea and vomiting overnight.  She denies diarrhea.  She admits to radiation into the bilateral lower quadrants and has a history of ovarian cysts.  Patient denies fever, chills, sick contacts, melena, hematochezia, diarrhea, dysuria, hematuria, and vaginal discharge.  She states her LMP was 1 week ago.  Patient admits to menometrorrhagia.    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  Patient denies opiate use.      ALLERGIES  Allergies   Allergen Reactions   • Penicillins        CURRENT MEDICATIONS  Home Medications     Reviewed by Roger Zurita R.N. (Registered Nurse) on 10/20/19 at 0948  Med List Status: Partial   Medication Last Dose Status   ibuprofen (MOTRIN) 600 MG Tab  Active                PAST MEDICAL HISTORY   has a past medical history of Anxiety, Bipolar 2 disorder, Borderline personality disorder (HCC), Eczema (10/15/2010), H/O: Bell's palsy (12/26/08), Major depressive disorder, Myopia (10/15/2010), and Obesity.    SURGICAL HISTORY  patient denies any surgical history    SOCIAL HISTORY  Social History     Tobacco Use   • Smoking status: Current Every Day Smoker     Packs/day: 0.50     Years: 11.00     Pack years: 5.50     Types: Cigarettes   • Smokeless tobacco: Never Used   Substance and Sexual Activity   • Alcohol use: Yes     Comment: occ   • Drug use: Not Currently     Comment: Hx of meth. last use 10/16   • Sexual activity: Yes     Partners: Male     Comment:        REVIEW " "OF SYSTEMS  See HPI for further details.  All other systems are negative except as above in HPI.      PHYSICAL EXAM  VITAL SIGNS: /65   Pulse 91   Temp 37.1 °C (98.7 °F) (Temporal)   Resp 16   Ht 1.6 m (5' 3\")   Wt (!) 127.9 kg (281 lb 15.5 oz)   SpO2 98%   BMI 49.95 kg/m²     General:  WD obese, nontoxic appearing in NAD; fatigued but arousable; V/S as above; afebrile  Skin: warm and dry; good color; no rash  HEENT: NCAT; EOMs intact; PERRL; no scleral icterus; dry lips  Neck: FROM; soft  Cardiovascular: Regular heart rate and rhythm.  No murmurs, rubs, or gallops; pulses 2+ bilaterally radially  Lungs: Clear to auscultation with good air movement bilaterally.  No wheezes, rhonchi, or rales.   Abdomen: BS present; soft; NTND; no rebound, guarding, or rigidity.  No organomegaly or pulsatile mass  Extremities: RODRIGUEZ x 4; no e/o trauma; no pedal edema  Neurologic: CNs III-XII grossly intact; speech clear; distal sensation intact; strength 5/5 UE/LEs  Psychiatric: Flat affect, normal mood    LABS  Results for orders placed or performed during the hospital encounter of 10/20/19   URINALYSIS   Result Value Ref Range    Color Yellow     Character Hazy (A)     Specific Gravity 1.015 <1.035    Ph 7.0 5.0 - 8.0    Glucose Negative Negative mg/dL    Ketones Negative Negative mg/dL    Protein Negative Negative mg/dL    Bilirubin Negative Negative    Urobilinogen, Urine 1.0 Negative    Nitrite Negative Negative    Leukocyte Esterase Trace (A) Negative    Occult Blood Negative Negative    Micro Urine Req Microscopic    CBC WITH DIFFERENTIAL   Result Value Ref Range    WBC 5.0 4.8 - 10.8 K/uL    RBC 4.81 4.20 - 5.40 M/uL    Hemoglobin 14.6 12.0 - 16.0 g/dL    Hematocrit 43.4 37.0 - 47.0 %    MCV 90.2 81.4 - 97.8 fL    MCH 30.4 27.0 - 33.0 pg    MCHC 33.6 33.6 - 35.0 g/dL    RDW 40.9 35.9 - 50.0 fL    Platelet Count 245 164 - 446 K/uL    MPV 10.7 9.0 - 12.9 fL    Neutrophils-Polys 65.60 44.00 - 72.00 %    Lymphocytes " 25.80 22.00 - 41.00 %    Monocytes 6.80 0.00 - 13.40 %    Eosinophils 1.40 0.00 - 6.90 %    Basophils 0.20 0.00 - 1.80 %    Immature Granulocytes 0.20 0.00 - 0.90 %    Nucleated RBC 0.00 /100 WBC    Neutrophils (Absolute) 3.30 2.00 - 7.15 K/uL    Lymphs (Absolute) 1.30 1.00 - 4.80 K/uL    Monos (Absolute) 0.34 0.00 - 0.85 K/uL    Eos (Absolute) 0.07 0.00 - 0.51 K/uL    Baso (Absolute) 0.01 0.00 - 0.12 K/uL    Immature Granulocytes (abs) 0.01 0.00 - 0.11 K/uL    NRBC (Absolute) 0.00 K/uL   CMP   Result Value Ref Range    Sodium 137 135 - 145 mmol/L    Potassium 3.9 3.6 - 5.5 mmol/L    Chloride 107 96 - 112 mmol/L    Co2 22 20 - 33 mmol/L    Anion Gap 8.0 0.0 - 11.9    Glucose 102 (H) 65 - 99 mg/dL    Bun 7 (L) 8 - 22 mg/dL    Creatinine 0.56 0.50 - 1.40 mg/dL    Calcium 9.2 8.5 - 10.5 mg/dL    AST(SGOT) 16 12 - 45 U/L    ALT(SGPT) 21 2 - 50 U/L    Alkaline Phosphatase 78 30 - 99 U/L    Total Bilirubin 1.0 0.1 - 1.5 mg/dL    Albumin 3.9 3.2 - 4.9 g/dL    Total Protein 7.3 6.0 - 8.2 g/dL    Globulin 3.4 1.9 - 3.5 g/dL    A-G Ratio 1.1 g/dL   LIPASE   Result Value Ref Range    Lipase 20 11 - 82 U/L   URINE DRUG SCREEN   Result Value Ref Range    Amphetamines Urine Positive (A) Negative    Barbiturates Negative Negative    Benzodiazepines Negative Negative    Cocaine Metabolite Negative Negative    Methadone Negative Negative    Opiates Negative Negative    Oxycodone Negative Negative    Phencyclidine -Pcp Negative Negative    Propoxyphene Negative Negative    Cannabinoid Metab Negative Negative   BETA-HCG QUALITATIVE SERUM   Result Value Ref Range    Beta-Hcg Qualitative Serum Negative Negative   URINE MICROSCOPIC (W/UA)   Result Value Ref Range    WBC 5-10 (A) /hpf    RBC 0-2 /hpf    Bacteria Few (A) None /hpf    Epithelial Cells Moderate (A) /hpf    Mucous Threads Moderate /hpf    Ca Oxalate Crystal Few /hpf    Hyaline Cast 0-2 /lpf   ESTIMATED GFR   Result Value Ref Range    GFR If  >60 >60  "mL/min/1.73 m 2    GFR If Non African American >60 >60 mL/min/1.73 m 2         IMAGING  US-PELVIC COMPLETE (TRANSABDOMINAL/TRANSVAGINAL) (COMBO)   Final Result      1.  Complex RIGHT ovarian cystic lesion measuring 1.7 cm, likely involuting follicle or resolving hemorrhagic cyst.  Follow-up recommended.   2.  No evidence for ovarian torsion.          MEDICAL RECORD  I have reviewed patient's medical record and pertinent results are listed below.      COURSE & MEDICAL DECISION MAKING  I have reviewed any medical record information, laboratory studies and radiographic results as noted.    Mitzi Abel is a 18 y.o. female who presents complaining of bilateral low back pain with nausea and vomiting.  Patient drifts off to sleep while I am interviewing her but states her pain is \"severe.\"  Patient is afebrile.  She is nontoxic-appearing.  She appears mildly dehydrated.  Differential diagnosis includes renal colic, musculoskeletal pain, ovarian cyst, ectopic pregnancy, UTI.    NS bolus was ordered for possible dehydration related to patient's sxs of vomiting.  Zofran ordered for nausea.    UDS positive for methamphetamines.  The patient reported she had been at the facility for 72 hours and that she last used meth 4 days ago.    Pt was re-evaluated at 12:58 PM  hCG negative.  UDS positive for methamphetamines.  Blood work is reassuring.  Pelvic ultrasound demonstrates a complex cyst measuring 1.7 cm on the right.  No torsion noted.  P.o. challenge ordered.    No vomiting here.  P.o. challenge tolerated.  At this time I am unclear what is causing the patient's low back pain but I suspect a musculoskeletal etiology.  Patient denies IV drug use.  She is advised to return to the ER for fever, persistent vomiting, increased pain, weakness, incontinence, or other concerns.  She is also advised to follow-up with her GYN regarding the ovarian cyst and establish care with a PCP for the back pain.  Imaging is not " indicated today.    FINAL IMPRESSION  1. Nausea and vomiting, intractability of vomiting not specified, unspecified vomiting type     2. Cyst of right ovary     3. Acute low back pain, unspecified back pain laterality, unspecified whether sciatica present         Electronically signed by: Amber Quan, 10/20/2019 10:32 AM

## 2019-10-25 ENCOUNTER — HOSPITAL ENCOUNTER (EMERGENCY)
Facility: MEDICAL CENTER | Age: 19
End: 2019-10-25
Attending: EMERGENCY MEDICINE
Payer: MEDICAID

## 2019-10-25 ENCOUNTER — HOSPITAL ENCOUNTER (EMERGENCY)
Dept: HOSPITAL 8 - ED | Age: 19
LOS: 1 days | Discharge: HOME | End: 2019-10-26
Payer: MEDICAID

## 2019-10-25 ENCOUNTER — APPOINTMENT (OUTPATIENT)
Dept: RADIOLOGY | Facility: MEDICAL CENTER | Age: 19
End: 2019-10-25
Attending: EMERGENCY MEDICINE
Payer: MEDICAID

## 2019-10-25 VITALS — WEIGHT: 286.6 LBS | BODY MASS INDEX: 48.93 KG/M2 | HEIGHT: 64 IN

## 2019-10-25 VITALS
SYSTOLIC BLOOD PRESSURE: 158 MMHG | RESPIRATION RATE: 20 BRPM | WEIGHT: 289.9 LBS | DIASTOLIC BLOOD PRESSURE: 102 MMHG | TEMPERATURE: 97.3 F | HEIGHT: 63 IN | HEART RATE: 90 BPM | BODY MASS INDEX: 51.37 KG/M2 | OXYGEN SATURATION: 100 %

## 2019-10-25 DIAGNOSIS — F17.200: ICD-10-CM

## 2019-10-25 DIAGNOSIS — J18.9: Primary | ICD-10-CM

## 2019-10-25 DIAGNOSIS — R10.9: ICD-10-CM

## 2019-10-25 DIAGNOSIS — R10.13 EPIGASTRIC PAIN: ICD-10-CM

## 2019-10-25 DIAGNOSIS — R07.82 INTERCOSTAL PAIN: ICD-10-CM

## 2019-10-25 DIAGNOSIS — R11.2 NON-INTRACTABLE VOMITING WITH NAUSEA, UNSPECIFIED VOMITING TYPE: ICD-10-CM

## 2019-10-25 LAB
ALBUMIN SERPL BCP-MCNC: 4 G/DL (ref 3.2–4.9)
ALBUMIN/GLOB SERPL: 1.1 G/DL
ALP SERPL-CCNC: 83 U/L (ref 30–99)
ALT SERPL-CCNC: 28 U/L (ref 2–50)
ANION GAP SERPL CALC-SCNC: 9 MMOL/L (ref 0–11.9)
APPEARANCE UR: CLEAR
AST SERPL-CCNC: 20 U/L (ref 12–45)
BACTERIA #/AREA URNS HPF: ABNORMAL /HPF
BASOPHILS # BLD AUTO: 0.3 % (ref 0–1.8)
BASOPHILS # BLD: 0.03 K/UL (ref 0–0.12)
BILIRUB SERPL-MCNC: 0.5 MG/DL (ref 0.1–1.5)
BILIRUB UR QL STRIP.AUTO: NEGATIVE
BUN SERPL-MCNC: 11 MG/DL (ref 8–22)
CALCIUM SERPL-MCNC: 9.4 MG/DL (ref 8.5–10.5)
CHLORIDE SERPL-SCNC: 107 MMOL/L (ref 96–112)
CO2 SERPL-SCNC: 24 MMOL/L (ref 20–33)
COLOR UR: YELLOW
CREAT SERPL-MCNC: 0.8 MG/DL (ref 0.5–1.4)
EKG IMPRESSION: NORMAL
EOSINOPHIL # BLD AUTO: 0.15 K/UL (ref 0–0.51)
EOSINOPHIL NFR BLD: 1.6 % (ref 0–6.9)
EPI CELLS #/AREA URNS HPF: ABNORMAL /HPF
ERYTHROCYTE [DISTWIDTH] IN BLOOD BY AUTOMATED COUNT: 42.6 FL (ref 35.9–50)
GLOBULIN SER CALC-MCNC: 3.6 G/DL (ref 1.9–3.5)
GLUCOSE SERPL-MCNC: 96 MG/DL (ref 65–99)
GLUCOSE UR STRIP.AUTO-MCNC: NEGATIVE MG/DL
HCG SERPL QL: NEGATIVE
HCT VFR BLD AUTO: 40.9 % (ref 37–47)
HGB BLD-MCNC: 13.9 G/DL (ref 12–16)
HYALINE CASTS #/AREA URNS LPF: ABNORMAL /LPF
IMM GRANULOCYTES # BLD AUTO: 0.02 K/UL (ref 0–0.11)
IMM GRANULOCYTES NFR BLD AUTO: 0.2 % (ref 0–0.9)
KETONES UR STRIP.AUTO-MCNC: NEGATIVE MG/DL
LEUKOCYTE ESTERASE UR QL STRIP.AUTO: ABNORMAL
LIPASE SERPL-CCNC: 50 U/L (ref 11–82)
LYMPHOCYTES # BLD AUTO: 3.04 K/UL (ref 1–4.8)
LYMPHOCYTES NFR BLD: 31.8 % (ref 22–41)
MCH RBC QN AUTO: 30.8 PG (ref 27–33)
MCHC RBC AUTO-ENTMCNC: 34 G/DL (ref 33.6–35)
MCV RBC AUTO: 90.5 FL (ref 81.4–97.8)
MICRO URNS: ABNORMAL
MONOCYTES # BLD AUTO: 0.56 K/UL (ref 0–0.85)
MONOCYTES NFR BLD AUTO: 5.9 % (ref 0–13.4)
MUCOUS THREADS #/AREA URNS HPF: ABNORMAL /HPF
NEUTROPHILS # BLD AUTO: 5.75 K/UL (ref 2–7.15)
NEUTROPHILS NFR BLD: 60.2 % (ref 44–72)
NITRITE UR QL STRIP.AUTO: NEGATIVE
NRBC # BLD AUTO: 0 K/UL
NRBC BLD-RTO: 0 /100 WBC
PH UR STRIP.AUTO: 6.5 [PH] (ref 5–8)
PLATELET # BLD AUTO: 267 K/UL (ref 164–446)
PMV BLD AUTO: 10.1 FL (ref 9–12.9)
POTASSIUM SERPL-SCNC: 4.1 MMOL/L (ref 3.6–5.5)
PROT SERPL-MCNC: 7.6 G/DL (ref 6–8.2)
PROT UR QL STRIP: NEGATIVE MG/DL
RBC # BLD AUTO: 4.52 M/UL (ref 4.2–5.4)
RBC # URNS HPF: ABNORMAL /HPF
RBC UR QL AUTO: NEGATIVE
SODIUM SERPL-SCNC: 140 MMOL/L (ref 135–145)
SP GR UR STRIP.AUTO: 1.02
TROPONIN T SERPL-MCNC: <6 NG/L (ref 6–19)
UROBILINOGEN UR STRIP.AUTO-MCNC: 1 MG/DL
WBC # BLD AUTO: 9.6 K/UL (ref 4.8–10.8)
WBC #/AREA URNS HPF: ABNORMAL /HPF

## 2019-10-25 PROCEDURE — 36415 COLL VENOUS BLD VENIPUNCTURE: CPT

## 2019-10-25 PROCEDURE — 74176 CT ABD & PELVIS W/O CONTRAST: CPT

## 2019-10-25 PROCEDURE — 71045 X-RAY EXAM CHEST 1 VIEW: CPT

## 2019-10-25 PROCEDURE — 71275 CT ANGIOGRAPHY CHEST: CPT

## 2019-10-25 PROCEDURE — 93005 ELECTROCARDIOGRAM TRACING: CPT

## 2019-10-25 PROCEDURE — 80053 COMPREHEN METABOLIC PANEL: CPT

## 2019-10-25 PROCEDURE — 83690 ASSAY OF LIPASE: CPT

## 2019-10-25 PROCEDURE — 700111 HCHG RX REV CODE 636 W/ 250 OVERRIDE (IP): Performed by: EMERGENCY MEDICINE

## 2019-10-25 PROCEDURE — 700102 HCHG RX REV CODE 250 W/ 637 OVERRIDE(OP): Performed by: EMERGENCY MEDICINE

## 2019-10-25 PROCEDURE — 85379 FIBRIN DEGRADATION QUANT: CPT

## 2019-10-25 PROCEDURE — 84703 CHORIONIC GONADOTROPIN ASSAY: CPT

## 2019-10-25 PROCEDURE — 81003 URINALYSIS AUTO W/O SCOPE: CPT

## 2019-10-25 PROCEDURE — 81001 URINALYSIS AUTO W/SCOPE: CPT

## 2019-10-25 PROCEDURE — 93005 ELECTROCARDIOGRAM TRACING: CPT | Performed by: EMERGENCY MEDICINE

## 2019-10-25 PROCEDURE — 85025 COMPLETE CBC W/AUTO DIFF WBC: CPT

## 2019-10-25 PROCEDURE — 99284 EMERGENCY DEPT VISIT MOD MDM: CPT

## 2019-10-25 PROCEDURE — 96375 TX/PRO/DX INJ NEW DRUG ADDON: CPT

## 2019-10-25 PROCEDURE — A9270 NON-COVERED ITEM OR SERVICE: HCPCS | Performed by: EMERGENCY MEDICINE

## 2019-10-25 PROCEDURE — 96374 THER/PROPH/DIAG INJ IV PUSH: CPT

## 2019-10-25 PROCEDURE — 99285 EMERGENCY DEPT VISIT HI MDM: CPT

## 2019-10-25 PROCEDURE — 84484 ASSAY OF TROPONIN QUANT: CPT

## 2019-10-25 PROCEDURE — 71046 X-RAY EXAM CHEST 2 VIEWS: CPT

## 2019-10-25 RX ORDER — ONDANSETRON 2 MG/ML
4 INJECTION INTRAMUSCULAR; INTRAVENOUS ONCE
Status: COMPLETED | OUTPATIENT
Start: 2019-10-25 | End: 2019-10-25

## 2019-10-25 RX ORDER — SUCRALFATE 1 G/1
1 TABLET ORAL
Qty: 56 TAB | Refills: 0 | Status: SHIPPED | OUTPATIENT
Start: 2019-10-25 | End: 2019-11-08

## 2019-10-25 RX ORDER — OMEPRAZOLE 20 MG/1
20 CAPSULE, DELAYED RELEASE ORAL DAILY
Qty: 14 CAP | Refills: 0 | Status: SHIPPED | OUTPATIENT
Start: 2019-10-25 | End: 2019-11-08

## 2019-10-25 RX ORDER — FAMOTIDINE 20 MG/1
20 TABLET, FILM COATED ORAL ONCE
Status: COMPLETED | OUTPATIENT
Start: 2019-10-25 | End: 2019-10-25

## 2019-10-25 RX ADMIN — ONDANSETRON 4 MG: 2 INJECTION INTRAMUSCULAR; INTRAVENOUS at 22:48

## 2019-10-25 RX ADMIN — LIDOCAINE HYDROCHLORIDE 30 ML: 20 SOLUTION OROPHARYNGEAL at 20:47

## 2019-10-25 RX ADMIN — FAMOTIDINE 20 MG: 20 TABLET ORAL at 20:46

## 2019-10-26 VITALS — DIASTOLIC BLOOD PRESSURE: 81 MMHG | SYSTOLIC BLOOD PRESSURE: 131 MMHG

## 2019-10-26 LAB
ALBUMIN SERPL-MCNC: 3.4 G/DL (ref 3.4–5)
ALP SERPL-CCNC: 81 U/L (ref 45–117)
ALT SERPL-CCNC: 36 U/L (ref 12–78)
ANION GAP SERPL CALC-SCNC: 7 MMOL/L (ref 5–15)
BASOPHILS # BLD AUTO: 0.05 X10^3/UL (ref 0–0.3)
BASOPHILS NFR BLD AUTO: 1 % (ref 0–1)
BILIRUB SERPL-MCNC: 0.6 MG/DL (ref 0.2–1)
CALCIUM SERPL-MCNC: 9 MG/DL (ref 8.5–10.1)
CHLORIDE SERPL-SCNC: 111 MMOL/L (ref 98–107)
CREAT SERPL-MCNC: 0.73 MG/DL (ref 0.55–1.02)
CULTURE INDICATED?: NO
D DIMER PPP IA.FEU-MCNC: 1.5 UG/ML (FEU) (ref 0–0.5)
EOSINOPHIL # BLD AUTO: 0.06 X10^3/UL (ref 0–0.8)
EOSINOPHIL NFR BLD AUTO: 1 % (ref 1–7)
ERYTHROCYTE [DISTWIDTH] IN BLOOD BY AUTOMATED COUNT: 13.4 % (ref 9.6–15.2)
LYMPHOCYTES # BLD AUTO: 1.98 X10^3/UL (ref 1–6.1)
LYMPHOCYTES NFR BLD AUTO: 20 % (ref 22–44)
MCH RBC QN AUTO: 30.3 PG (ref 27–34.8)
MCHC RBC AUTO-ENTMCNC: 32.9 G/DL (ref 32.4–35.8)
MCV RBC AUTO: 92.2 FL (ref 80–100)
MD: NO
MICROSCOPIC: (no result)
MONOCYTES # BLD AUTO: 0.5 X10^3/UL (ref 0–1.4)
MONOCYTES NFR BLD AUTO: 5 % (ref 2–9)
NEUTROPHILS # BLD AUTO: 7.32 X10^3/UL (ref 1.8–8)
NEUTROPHILS NFR BLD AUTO: 74 % (ref 42–75)
PLATELET # BLD AUTO: 267 X10^3/UL (ref 130–400)
PMV BLD AUTO: 8.4 FL (ref 7.4–10.4)
PROT SERPL-MCNC: 7.4 G/DL (ref 6.4–8.2)
RBC # BLD AUTO: 4.4 X10^6/UL (ref 3.82–5.3)

## 2019-10-26 NOTE — ED NOTES
Pt continues to call repeatedly reporting pain, [facility] notified, no pain meds ordered at this time.

## 2019-10-26 NOTE — ED NOTES
Pt reported immediate relief with GI cocktail.  A few minutes later decided to go to the restroom to give UA, upon getting back, she stated that the discomfort came back when she got up and walked.

## 2019-10-26 NOTE — ED TRIAGE NOTES
Pt to triage, pt appears anxious restless, c/o bilat flank/ chest pain, pt also c/o chest pain . Pt moaning in triage. EKG done. Protocol ordered

## 2019-10-26 NOTE — ED PROVIDER NOTES
"                                                                          ED Provider Note    Scribed for Librado Puga M.D. by Chitra Crespo. 10/25/2019  7:55 PM    CHIEF COMPLAINT  Chief Complaint   Patient presents with   • Back Pain   • Flank Pain   • Chest Pain       HPI  Mitzi Abel is a 18 y.o. female who presents to the Emergency Department for evaluation of back pain and chest pain onset 3 hours ago. Patient describes her pain as dull at first, but then becoming sharp and feels like \"someone stabbing me.\" Additionally, patient reports of flank pain, vomiting, and dyspnea, but she denies any dysuria or hematuria. Patient denies any recent injuries or traumas. She has had difficulty tolerating PO fluids/solids. Patient reports that she was seen in the ED 5 days ago for the same symptoms. Patient has a history of Meth use and alcohol intake, but states that she has been clean for 8 days. No history of kidney stones.     Chart is reviewed from previous visit for bilateral flank pain and lower back pain had positive UDS for amphetamines, reassuring labs, TV US showed 1.7 cm hemorraghic cyst    REVIEW OF SYSTEMS  Constitutional: No fevers, chills, or recent illness.  Skin: No rashes or diaphoresis.  Eyes: No change in vision, no discharge.  ENT: No hearing change. No rhinorrhea or nasal congestion, no ST or difficulty swallowing.  Respiratory: Dyspnea. No coughing or hemoptysis. No Wheezing.  Cardiac: CP. No palpitations, edema. No PND or orthopnea.  GI: Vomiting. + Abdominal Pain; +nausea; no diarrhea, constipation. No blood in stool.  : Back pain, flank pain. No dysuria. No D/C. No frequency or urgency. No hesitancy.   MSK: No pain in joints or muscles. No calf pain or swelling.  Neuro: No HA or paresthesias. No focal weakness.  Endocrine: No polyuria or polydipsia. No heat or cold intolerance.  Heme: No easy bruising. No history of bleeding disorders or anemia.  See HPI for further details. All " "other systems are negative.     PAST MEDICAL HISTORY   has a past medical history of Anxiety, Bipolar 2 disorder, Borderline personality disorder (HCC), Eczema (10/15/2010), H/O: Bell's palsy (12/26/08), Major depressive disorder, Myopia (10/15/2010), and Obesity.    SOCIAL HISTORY  Social History     Tobacco Use   • Smoking status: Current Every Day Smoker     Packs/day: 0.50     Years: 11.00     Pack years: 5.50     Types: Cigarettes   • Smokeless tobacco: Never Used   Substance and Sexual Activity   • Alcohol use: Yes     Comment: occ   • Drug use: Not Currently     Comment: Hx of meth. last use 10/16   • Sexual activity: Yes     Partners: Male     Comment:      SURGICAL HISTORY  patient denies any surgical history    CURRENT MEDICATIONS  Current Outpatient Medications:   •  ondansetron (ZOFRAN ODT) 4 MG TABLET DISPERSIBLE, Take 1 Tab by mouth every 8 hours as needed for Nausea., Disp: 10 Tab, Rfl: 0  •  ibuprofen (MOTRIN) 600 MG Tab, Take 1 Tab by mouth every 6 hours as needed., Disp: 20 Tab, Rfl: 0    ALLERGIES  Allergies   Allergen Reactions   • Penicillins      PHYSICAL EXAM  VITAL SIGNS: BP (!) 175/108   Pulse 100   Temp 36.3 °C (97.3 °F) (Temporal)   Resp 16   Ht 1.6 m (5' 3\")   Wt (!) 131.5 kg (289 lb 14.5 oz)   SpO2 100%   BMI 51.35 kg/m²    Pulse ox interpretation: I interpret this pulse ox as normal.  Genl: Female sitting in bed comfortably, speaking clearly, appears in no acute distress. Anxious, non-toxic appearing.   Head: NC/AT   ENT: Mucous membranes moist, posterior pharynx clear, uvula midline, nares patent bilaterally. no thyromegaly.  Eyes: Normal sclera, pupils equal round reactive to light  Neck: Supple, FROM, no LAD appreciated   Pulmonary: Lungs are clear to auscultation bilaterally  Chest: Symmetric pain is alleviated with palpation of paravertebral muscle groups  CV:  RRR, no murmur appreciated, pulses 2+ in both upper and lower extremities,  Abdomen: Epigastric TTP, no " fragoso's sign, soft, ND; no rebound/guarding, no masses palpated, no HSM  : No CVA tenderness or suprapubic tenderness. Mild nonspecific flank TTP.  Musculoskeletal: Pain free ROM of the neck. Moving upper and lower extremities and spontaneous in coordinated fashion  Neuro: A&Ox4 (person, place, time, situation), speech fluent, gait steady, no focal deficits appreciated, No cerebellar signs, Sensation is grossly intact in the distal upper and lower extremities.  5/5 strength in  and dorsiflexion/plantar flexion of the ankles  Psych: Patient has an appropriate affect and behavior  Skin: No rash or lesions.  No pallor or jaundice.  No cyanosis.  Warm and dry.     DIAGNOSTIC STUDIES / PROCEDURES    EKG  Results for orders placed or performed during the hospital encounter of 10/25/19   EKG   Result Value Ref Range    Report       [facility] Dept.    Test Date:  2019-10-25  Pt Name:    SERENE TYLER                 Department: ER  MRN:        [id number]                      Room:  Gender:     Female                       Technician: [id number]  :        2000                   Requested By:ER TRIAGE PROTOCOL  Order #:    [id number]                    Reading MD: Edd Pavon MD    Measurements  Intervals                                Axis  Rate:       88                           P:          41  CT:         140                          QRS:        65  QRSD:       86                           T:          34  QT:         372  QTc:        450    Interpretive Statements  SINUS RHYTHM  BASELINE WANDER IN LEAD(S) V3,V6  Compared to ECG 08/15/2019 15:35:53  Sinus tachycardia no longer present  Electronically Signed On 10- 21:29:34 PDT by Edd Pavon MD       LABS  Labs Reviewed   COMP METABOLIC PANEL - Abnormal; Notable for the following components:       Result Value    Globulin 3.6 (*)     All other components within normal limits   URINALYSIS,CULTURE IF INDICATED - Abnormal; Notable  for the following components:    Leukocyte Esterase Small (*)     All other components within normal limits   URINE MICROSCOPIC (W/UA) - Abnormal; Notable for the following components:    Bacteria Few (*)     All other components within normal limits   CBC WITH DIFFERENTIAL   LIPASE   HCG QUAL SERUM   ESTIMATED GFR   TROPONIN     RADIOLOGY  DX-CHEST-2 VIEWS   Final Result      Normal chest.               INTERPRETING LOCATION: [address] [place] [address] [place], [address]        COURSE & MEDICAL DECISION MAKING  Pertinent Labs & Imaging studies reviewed. (See chart for details)    Differential diagnoses include but not limited to: PUD, pancreatitis, gastritis, GERD, cholelithiasis, cholecystitis, choledocholithiasis vs dysrhythmia, atypical ACS, UTI, kidney stone, pyelonephritis, PE considered.    7:55 PM - Patient seen and examined at bedside. Patient will be treated with GI cocktail and Pepcid. Ordered DX-chest, troponin, urinalysis culture, hCG qual, lipase, CMP, CBC with differential and EKG to evaluate her symptoms.     10:55 PM - Patient was reevaluated at bedside. Discussed lab and radiology results with the patient.     Medical Decision Making:   Patient presents with a chief complaint of chest pain with my initial primary concern includes the differential listed above. Triage vital signs are reviewed and are noted to be initially positive for tachycardia into the 120s. PERC score is unable to be excluded secondary to tachycardia.  Patient's pain is mostly musculoskeletal and reproducible however she has been having increased amounts of this pleuritic leak reported chest discomfort worse with deep inspiration.  The patient has multiple varying chest discomforts and while on the lower suspicion for PE is considered after discussion with the patient dimer is ordered. I obtained intravenous access. I reviewed the patient's history reported in the chart and was noting a prior history of ovarian cyst, recurrent evaluations for  chest discomfort, flank discomfort with no recent positive work-up for stone or infectious pathology.    A chest xray obtained and is unremarkable for pneumonia, pneumothorax, or widened mediastinum on my read. Laboratory analysis is obtained to evaluate for myocardial ischemia, evidence of infection, electrolyte abnormality, and abdominal sources for a cause for the patient's chest pain which are reassuring.  Troponin not elevated. EKG without acute ischemia or signs of infarction.  The patient is given GI cocktail and Pepcid with near complete alleviation of her epigastric discomfort.  Upon ambulation to provide urine sample she had multiple areas of achy pain noted in the chest wall and reevaluation at the bedside demonstrated more musculoskeletal origin than likely pleuritic or true pleurisy.   ?  As there are no significant laboratory findings, a nonischemic EKG and troponin, it is felt that the patient is not experiencing acute myocardial infarction at this time.  With the dimer not being elevated PE is not worked up further and her vital signs improved after receiving the symptomatic GI meds with no hypoxia here in the emergency department.  The patient on subsequent re-evaluations was noted to be sleeping and tolerating p.o. intake without difficulty.  She then rapidly transition saying that she had profound pain discomfort though her vital signs were noted to be unchanging and she had no changes to her abdominal exam.  She had some lability in her mood was somewhat difficult to reason with.  After a period of observation again reassessed the patient and she wished to be discharged home.  Dimer testing has not been completed and we discussed the results thus far in the patients that she would like to go home with GI indications and follow-up as an outpatient.  Again, I discussed the possible ramifications and while the dimer testing have been ordered and is not completed I do agree that this is unlikely to  be a pulmonary embolus.  Patient is aware that this is not completed and understands that if she has worsening chest pain or discomfort she will return to the emergency department.  She also verbalized understanding that in nature and cause permanent disability.    Based on the patient's history, physical exam, and laboratory analysis, the patient's most likely diagnosis is costochondritis/musculoskeletal rib pain/viral syndrome/esophagitis and gastritis.  Symptom medic medications including anti-inflammatories in conjunction with p.o. medications for 2 weeks are provided.  Patient is aware of the side effects of nonsteroidal anti-inflammatory medications and the likely cause of her overall etiology.  ?  HEART SCORE:0    The patient will not drink alcohol nor drive with prescribed medications. The patient will return for worsening symptoms and is stable at the time of discharge. The patient verbalizes understanding and will comply.     FINAL IMPRESSION  Visit Diagnoses     ICD-10-CM   1. Intercostal pain R07.82   2. Epigastric pain R10.13   3. Non-intractable vomiting with nausea, unspecified vomiting type R11.2     IChitra (Balaji), am scribing for, and in the presence of, Librado Puga M.D..    Electronically signed by: Chitra Crespo (Balaji), 10/25/2019    ILibrado M.D. personally performed the services described in this documentation, as scribed by Chitra Crespo in my presence, and it is both accurate and complete.C.    The note accurately reflects work and decisions made by me.  Librado Puga  10/25/2019  9:06 PM

## 2019-11-03 ENCOUNTER — HOSPITAL ENCOUNTER (EMERGENCY)
Dept: HOSPITAL 8 - ED | Age: 19
Discharge: HOME | End: 2019-11-03
Payer: MEDICAID

## 2019-11-03 VITALS — WEIGHT: 264.55 LBS | HEIGHT: 64 IN | BODY MASS INDEX: 45.17 KG/M2

## 2019-11-03 VITALS — SYSTOLIC BLOOD PRESSURE: 134 MMHG | DIASTOLIC BLOOD PRESSURE: 56 MMHG

## 2019-11-03 DIAGNOSIS — O21.9: ICD-10-CM

## 2019-11-03 DIAGNOSIS — O99.611: Primary | ICD-10-CM

## 2019-11-03 DIAGNOSIS — R10.13: ICD-10-CM

## 2019-11-03 DIAGNOSIS — O99.341: ICD-10-CM

## 2019-11-03 DIAGNOSIS — F41.1: ICD-10-CM

## 2019-11-03 DIAGNOSIS — Z3A.01: ICD-10-CM

## 2019-11-03 LAB
ALBUMIN SERPL-MCNC: 3.6 G/DL (ref 3.4–5)
ALP SERPL-CCNC: 88 U/L (ref 45–117)
ALT SERPL-CCNC: 46 U/L (ref 12–78)
ANION GAP SERPL CALC-SCNC: 8 MMOL/L (ref 5–15)
BASOPHILS # BLD AUTO: 0.06 X10^3/UL (ref 0–0.3)
BASOPHILS NFR BLD AUTO: 1 % (ref 0–1)
BILIRUB SERPL-MCNC: 0.7 MG/DL (ref 0.2–1)
CALCIUM SERPL-MCNC: 8.8 MG/DL (ref 8.5–10.1)
CHLORIDE SERPL-SCNC: 111 MMOL/L (ref 98–107)
CREAT SERPL-MCNC: 0.71 MG/DL (ref 0.55–1.02)
EOSINOPHIL # BLD AUTO: 0.1 X10^3/UL (ref 0–0.8)
EOSINOPHIL NFR BLD AUTO: 1 % (ref 1–7)
ERYTHROCYTE [DISTWIDTH] IN BLOOD BY AUTOMATED COUNT: 13.4 % (ref 9.6–15.2)
LYMPHOCYTES # BLD AUTO: 2.28 X10^3/UL (ref 1–6.1)
LYMPHOCYTES NFR BLD AUTO: 27 % (ref 22–44)
MCH RBC QN AUTO: 30.4 PG (ref 27–34.8)
MCHC RBC AUTO-ENTMCNC: 33.4 G/DL (ref 32.4–35.8)
MCV RBC AUTO: 90.9 FL (ref 80–100)
MD: NO
MONOCYTES # BLD AUTO: 0.55 X10^3/UL (ref 0–1.4)
MONOCYTES NFR BLD AUTO: 7 % (ref 2–9)
NEUTROPHILS # BLD AUTO: 5.38 X10^3/UL (ref 1.8–8)
NEUTROPHILS NFR BLD AUTO: 64 % (ref 42–75)
PLATELET # BLD AUTO: 272 X10^3/UL (ref 130–400)
PMV BLD AUTO: 8.2 FL (ref 7.4–10.4)
PROT SERPL-MCNC: 7.8 G/DL (ref 6.4–8.2)
RBC # BLD AUTO: 4.52 X10^6/UL (ref 3.82–5.3)

## 2019-11-03 PROCEDURE — 36415 COLL VENOUS BLD VENIPUNCTURE: CPT

## 2019-11-03 PROCEDURE — 76700 US EXAM ABDOM COMPLETE: CPT

## 2019-11-03 PROCEDURE — 96374 THER/PROPH/DIAG INJ IV PUSH: CPT

## 2019-11-03 PROCEDURE — 96375 TX/PRO/DX INJ NEW DRUG ADDON: CPT

## 2019-11-03 PROCEDURE — 84702 CHORIONIC GONADOTROPIN TEST: CPT

## 2019-11-03 PROCEDURE — 85025 COMPLETE CBC W/AUTO DIFF WBC: CPT

## 2019-11-03 PROCEDURE — 83690 ASSAY OF LIPASE: CPT

## 2019-11-03 PROCEDURE — 76830 TRANSVAGINAL US NON-OB: CPT

## 2019-11-03 PROCEDURE — 86901 BLOOD TYPING SEROLOGIC RH(D): CPT

## 2019-11-03 PROCEDURE — 80053 COMPREHEN METABOLIC PANEL: CPT

## 2019-11-03 PROCEDURE — 96361 HYDRATE IV INFUSION ADD-ON: CPT

## 2019-11-03 PROCEDURE — 99284 EMERGENCY DEPT VISIT MOD MDM: CPT

## 2019-11-03 NOTE — NUR
PT WHEELED BACK FROM TRIAGE. CRYING, SHOUTING & CARRYING ON. PT THEN BEGAN 
SHOUTING/SWEARING AT STAFF & THROWING BELONGINGS. MULT STAFF MEMEBERS C 
SECURITY AT BS. ATTEMPTED TO REASON C PT, AND EXPLAIN PROCESS OF BEING REMOVED 
FROM PREMISIS IF BEHAVIOR CONTINUES. 



PT AMBUALTORY TO RESTROOM, REFUSING TO COME OUT AFTER SEVERAL MINUTES. SECURITY 
UNLOCKED DOOR, PT REFUSING TO GO BACK TO ROOM. ESCORTED BACK TO ROOM. AGAIN 
EXPLAINED IF BEHAVIOR DOESNT IMPROVE SHE WILL BE ESCORTED OUT OF ER. PT THEN 
CALMED DOWN. ALLOWED THIS RN TO START IV, MEDS PER MAR. & TAKEN TO U/S.

## 2019-11-30 ENCOUNTER — APPOINTMENT (OUTPATIENT)
Dept: RADIOLOGY | Facility: MEDICAL CENTER | Age: 19
End: 2019-11-30
Attending: STUDENT IN AN ORGANIZED HEALTH CARE EDUCATION/TRAINING PROGRAM
Payer: MEDICAID

## 2019-11-30 ENCOUNTER — HOSPITAL ENCOUNTER (EMERGENCY)
Facility: MEDICAL CENTER | Age: 19
End: 2019-11-30
Attending: EMERGENCY MEDICINE
Payer: MEDICAID

## 2019-11-30 VITALS
HEART RATE: 92 BPM | DIASTOLIC BLOOD PRESSURE: 68 MMHG | BODY MASS INDEX: 50.42 KG/M2 | WEIGHT: 284.61 LBS | TEMPERATURE: 96.8 F | SYSTOLIC BLOOD PRESSURE: 123 MMHG | RESPIRATION RATE: 14 BRPM | OXYGEN SATURATION: 98 %

## 2019-11-30 DIAGNOSIS — J06.9 VIRAL URI WITH COUGH: ICD-10-CM

## 2019-11-30 LAB
FLUAV RNA SPEC QL NAA+PROBE: NEGATIVE
FLUBV RNA SPEC QL NAA+PROBE: NEGATIVE

## 2019-11-30 PROCEDURE — 700102 HCHG RX REV CODE 250 W/ 637 OVERRIDE(OP): Performed by: EMERGENCY MEDICINE

## 2019-11-30 PROCEDURE — A9270 NON-COVERED ITEM OR SERVICE: HCPCS | Performed by: EMERGENCY MEDICINE

## 2019-11-30 PROCEDURE — 71046 X-RAY EXAM CHEST 2 VIEWS: CPT

## 2019-11-30 PROCEDURE — 99284 EMERGENCY DEPT VISIT MOD MDM: CPT

## 2019-11-30 PROCEDURE — 700102 HCHG RX REV CODE 250 W/ 637 OVERRIDE(OP): Performed by: STUDENT IN AN ORGANIZED HEALTH CARE EDUCATION/TRAINING PROGRAM

## 2019-11-30 PROCEDURE — A9270 NON-COVERED ITEM OR SERVICE: HCPCS | Performed by: STUDENT IN AN ORGANIZED HEALTH CARE EDUCATION/TRAINING PROGRAM

## 2019-11-30 PROCEDURE — 87502 INFLUENZA DNA AMP PROBE: CPT

## 2019-11-30 RX ORDER — ACETAMINOPHEN 325 MG/1
650 TABLET ORAL ONCE
Status: COMPLETED | OUTPATIENT
Start: 2019-11-30 | End: 2019-11-30

## 2019-11-30 RX ORDER — ALBUTEROL SULFATE 90 UG/1
2 AEROSOL, METERED RESPIRATORY (INHALATION) ONCE
Status: COMPLETED | OUTPATIENT
Start: 2019-11-30 | End: 2019-11-30

## 2019-11-30 RX ORDER — GUAIFENESIN 400 MG/1
1 TABLET ORAL EVERY 4 HOURS PRN
Qty: 42 TAB | Refills: 0 | Status: SHIPPED | OUTPATIENT
Start: 2019-11-30 | End: 2019-12-07

## 2019-11-30 RX ORDER — ALBUTEROL SULFATE 90 UG/1
2 AEROSOL, METERED RESPIRATORY (INHALATION) EVERY 6 HOURS PRN
Qty: 8.5 G | Refills: 0 | Status: SHIPPED | OUTPATIENT
Start: 2019-11-30 | End: 2021-08-21

## 2019-11-30 RX ADMIN — ALBUTEROL SULFATE 2 PUFF: 90 AEROSOL, METERED RESPIRATORY (INHALATION) at 02:29

## 2019-11-30 RX ADMIN — ACETAMINOPHEN 650 MG: 325 TABLET, FILM COATED ORAL at 02:27

## 2019-11-30 ASSESSMENT — LIFESTYLE VARIABLES: DO YOU DRINK ALCOHOL: NO

## 2019-11-30 NOTE — ED NOTES
Discharge instructions and prescriptions discussed with pt. Pt verbalized understanding. Pt discharged ambulatory.

## 2019-11-30 NOTE — ED PROVIDER NOTES
"CHIEF COMPLAINT(1/4)  Chief Complaint   Patient presents with   • Cough     Pt diagnosed with pneumonia in October, but never filled prescriptions for antibiotics.  Experiencing more severe coughing and malaise over last week.   • Pregnancy     Pt reports being 2 months pregnant and having \"a tear in my uterus.\"       HPI  Mitzi Abel is a 18 y.o. female, presenting with productive cough, nasal congestion, sore throat and SOB worsening for the past 6 days. Patients states she was seen at [facility] mid-October and diagnosed with pneumonia however she states her symptoms are starting to improve until this last week when they recurred. She was discharged with antibiotics (cannot recall which ones) and did not take them because she found out she was pregnant shortly after. Patient is approximately 2 months pregnant, last saw her OB - Dr. Rodriguez two weeks ago and was put on bed rest given that she had a \"uterine tear\".   PMH of asthma for which she uses albuterol as needed but has not refilled her inhaler, PUD - on PPI, stopped taking and ovarian cysts.   She is a current tobacco user, states she is trying to quit given her pregnancy but reports menthol cigarettes soothe her sore throat.      REVIEW OF SYSTEMS(1/10)  Pertinent positives include: sore throat, nasal congestion, sob, ear pain.  Pertinent negatives include: fever, chills, weight loss.  All other systems are negative.     PAST MEDICAL HISTORY(PFS1,2)  Past Medical History:   Diagnosis Date   • Anxiety    • Bipolar 2 disorder    • Borderline personality disorder (HCC)    • Eczema 10/15/2010   • H/O: Bell's palsy 12/26/08   • Major depressive disorder    • Myopia 10/15/2010   • Obesity      FAMILY HISTORY  Family History   Problem Relation Age of Onset   • Diabetes Mother         Gestational DM   • Psychiatric Illness Mother         depression   • Arthritis Mother    • Hypertension Father    • Alcohol/Drug Father    • Psychiatric Illness Father      "    bipolar   • Thyroid Paternal Grandmother    • Diabetes Paternal Grandfather    • Heart Disease Paternal Grandfather    • Hypertension Paternal Grandfather    • Cancer Paternal Grandfather         prostate   • Psychiatric Illness Brother         Bipolar/Bipolar   • Arthritis Maternal Grandfather      SOCIAL HISTORY  Social History     Tobacco Use   • Smoking status: Current Every Day Smoker     Packs/day: 0.50     Years: 11.00     Pack years: 5.50     Types: Cigarettes   • Smokeless tobacco: Never Used   Substance Use Topics   • Alcohol use: Yes     Comment: occ   • Drug use: Not Currently     Comment: Hx of meth. last use 10/16     Social History     Substance and Sexual Activity   Drug Use Not Currently    Comment: Hx of meth. last use 10/16     SURGICAL HISTORY  History reviewed. No pertinent surgical history.    CURRENT MEDICATIONS  Home Medications     Reviewed by Janneth Loomis R.N. (Registered Nurse) on 11/30/19 at 0124  Med List Status: Partial   Medication Last Dose Status   ibuprofen (MOTRIN) 600 MG Tab not taking Active   ondansetron (ZOFRAN ODT) 4 MG TABLET DISPERSIBLE not taking Active              ALLERGIES  Allergies   Allergen Reactions   • Penicillins      PHYSICAL EXAM  VITAL SIGNS: /68   Pulse 92   Temp 36 °C (96.8 °F) (Temporal)   Resp 14   Wt (!) 129.1 kg (284 lb 9.8 oz)   LMP 10/01/2019 (Approximate)   SpO2 98%   BMI 50.42 kg/m²    Constitutional: Well developed, Well nourished, In no acute ditress.  HENT: Normocephalic, atraumatic, bilateral external ears normal, dry mucous membranes, No exudates or erythema.   Eyes: PERRL, conjunctiva pink, no scleral icterus.   Cardiovascular: RRR, no murmurs, rubs or gallups.  Respiratory: Equal breath sounds bilateral, mild expiratory wheezes, no rales or ronchi.  Gastrointestinal: Soft, obese, nontender, nondistended.  Skin: No erythema, no rash.   Genitourinary:  No costovertebral angle tenderness.   Neurologic: Alert & oriented x 3,  cranial nerves 2-12 intact by passive exam.  No focal deficit noted.  Psychiatric: Affect normal, Judgment normal, Mood normal.     DIFFERENTIAL DIAGNOSIS:  Pneumonia, Influenza, Viral URI    RADIOLOGY/PROCEDURES  DX-CHEST-2 VIEWS   Final Result      No acute cardiopulmonary abnormality.        LABORATORY: Reviewed as below.  Results for orders placed or performed during the hospital encounter of 11/30/19   Influenza A/B By PCR (Adult - Flu Only)   Result Value Ref Range    Influenza virus A RNA Negative Negative    Influenza virus B, PCR Negative Negative       INTERVENTIONS:  Medications   acetaminophen (TYLENOL) tablet 650 mg (650 mg Oral Given 11/30/19 0227)   albuterol inhaler 2 Puff (2 Puffs Inhalation Given 11/30/19 0229)     COURSE & MEDICAL DECISION MAKING  Ms. Abel is 18 year old female, approximately 8 weeks pregnant, presenting with flu-like symptoms that started 6 days ago. CXR was negative for any acute cardiopulmonary process. Influenza panel was negative. She was given a breathing treatment in the ED which improved her wheezing. At this point, patient likely has a viral URI with onset more than 24hrs. Tamiflu was not given due to negative influenza PCR. She will be discharged and advised to treat symptoms at home with albuterol PRN, cough suppressants and avid hydration. Advised to return if symptoms worsen or don't improve in 24 hours.     /68   Pulse 92   Temp 36 °C (96.8 °F) (Temporal)   Resp 14   Wt (!) 129.1 kg (284 lb 9.8 oz)   LMP 10/01/2019 (Approximate)   SpO2 98%   BMI 50.42 kg/m²       Review nursing notes and vital signs a final time 1:57 AM    PLAN: Patient will be discharged home with a refill on her albuterol inhaler and Guaifenesin PRN. She is advised to follow up with Dr. Rodriguez and schedule an appointment. ED return precautions given.     CONDITION: Stable    FINAL IMPRESSION  1. Viral URI with cough      Electronically signed by: Josselyn Espinosa, 11/30/2019 1:57  AM    I was involved in the patient's evaluation care and physical examination and agree with the resident's evaluation history physical exam and dispo.  Patient is well-appearing without signs of community acquired pneumonia nor influenza and will be discharged with oral medications to help with viral center      Electronically signed by Dr. RAI Reyes MD 11/30/2019 for 12

## 2019-11-30 NOTE — ED TRIAGE NOTES
"Chief Complaint   Patient presents with   • Cough     Pt diagnosed with pneumonia in October, but never filled prescriptions for antibiotics.  Experiencing more severe coughing and malaise over last week.   • Pregnancy     Pt reports being 2 months pregnant and having \"a tear in my uterus.\"     Pt amb to triage with steady gait for above complaint.   Pt is alert and oriented, speaking in full sentences, follows commands and responds appropriately to questions. NAD. Demonstrates hoarse voice and cough.  Pt placed in lobby. Pt educated on triage process. Pt encouraged to alert staff for any changes.    "

## 2019-12-21 ENCOUNTER — HOSPITAL ENCOUNTER (EMERGENCY)
Dept: HOSPITAL 8 - ED | Age: 19
Discharge: LEFT BEFORE DISCHARGE | End: 2019-12-21
Payer: MEDICAID

## 2019-12-21 VITALS — BODY MASS INDEX: 48.93 KG/M2 | HEIGHT: 64 IN | WEIGHT: 286.6 LBS

## 2019-12-21 VITALS — DIASTOLIC BLOOD PRESSURE: 74 MMHG | SYSTOLIC BLOOD PRESSURE: 111 MMHG

## 2019-12-21 DIAGNOSIS — R42: Primary | ICD-10-CM

## 2019-12-21 DIAGNOSIS — Z53.21: ICD-10-CM

## 2020-01-09 ENCOUNTER — OFFICE VISIT (OUTPATIENT)
Dept: URGENT CARE | Facility: CLINIC | Age: 20
End: 2020-01-09
Payer: MEDICAID

## 2020-01-09 VITALS
HEART RATE: 94 BPM | HEIGHT: 64 IN | DIASTOLIC BLOOD PRESSURE: 64 MMHG | BODY MASS INDEX: 48.55 KG/M2 | WEIGHT: 284.4 LBS | SYSTOLIC BLOOD PRESSURE: 108 MMHG | RESPIRATION RATE: 14 BRPM | TEMPERATURE: 98.3 F | OXYGEN SATURATION: 95 %

## 2020-01-09 DIAGNOSIS — J06.9 VIRAL URI: ICD-10-CM

## 2020-01-09 DIAGNOSIS — B00.1 COLD SORE: ICD-10-CM

## 2020-01-09 PROCEDURE — 99214 OFFICE O/P EST MOD 30 MIN: CPT | Performed by: NURSE PRACTITIONER

## 2020-01-09 RX ORDER — ACYCLOVIR 800 MG/1
800 TABLET ORAL 3 TIMES DAILY
Qty: 21 TAB | Refills: 0 | Status: SHIPPED | OUTPATIENT
Start: 2020-01-09 | End: 2020-01-16

## 2020-01-09 RX ORDER — ACETAMINOPHEN 325 MG/1
650 TABLET ORAL EVERY 4 HOURS PRN
Qty: 30 TAB | Refills: 0 | Status: ON HOLD | OUTPATIENT
Start: 2020-01-09 | End: 2020-07-15

## 2020-01-09 ASSESSMENT — ENCOUNTER SYMPTOMS: COUGH: 1

## 2020-01-09 NOTE — PROGRESS NOTES
Subjective:      Mitzi Abel is a 19 y.o. female who presents with Body Aches (x 4 days.  Pt. complains of body aches, cold sores, cough, L ear ache and fevers. Pt. is 4 months pregnant.  Pt. has a Hx of Asthma.)            Sinus Problem   This is a new problem. Episode onset: pt reports new onset of sinus congestion, runny nose, mild ST and cold sore that started 3 days ago. +chills, tactile fevers. +hx of asthma and smoking. Currently 3 months pregnant. The problem is unchanged. There has been no fever. Associated symptoms include congestion, coughing and ear pain (left). Past treatments include acetaminophen (OTC benadryl). The treatment provided mild relief.       Review of Systems   HENT: Positive for congestion and ear pain (left).         Cold sore   Respiratory: Positive for cough.    All other systems reviewed and are negative.    Past Medical History:   Diagnosis Date   • Anxiety    • Bipolar 2 disorder    • Borderline personality disorder (HCC)    • Eczema 10/15/2010   • H/O: Bell's palsy 12/26/08   • Major depressive disorder    • Myopia 10/15/2010   • Obesity     No past surgical history on file.   Social History     Socioeconomic History   • Marital status: Single     Spouse name: Not on file   • Number of children: Not on file   • Years of education: Not on file   • Highest education level: Not on file   Occupational History   • Not on file   Social Needs   • Financial resource strain: Not on file   • Food insecurity:     Worry: Not on file     Inability: Not on file   • Transportation needs:     Medical: Not on file     Non-medical: Not on file   Tobacco Use   • Smoking status: Current Every Day Smoker     Packs/day: 0.50     Years: 11.00     Pack years: 5.50     Types: Cigarettes   • Smokeless tobacco: Never Used   Substance and Sexual Activity   • Alcohol use: Yes     Comment: occ   • Drug use: Not Currently     Comment: Hx of meth. last use 10/16   • Sexual activity: Yes     Partners:  "Male     Comment:    Lifestyle   • Physical activity:     Days per week: Not on file     Minutes per session: Not on file   • Stress: Not on file   Relationships   • Social connections:     Talks on phone: Not on file     Gets together: Not on file     Attends Che[employer] service: Not on file     Active member of club or organization: Not on file     Attends meetings of clubs or organizations: Not on file     Relationship status: Not on file   • Intimate partner violence:     Fear of current or ex partner: Not on file     Emotionally abused: Not on file     Physically abused: Not on file     Forced sexual activity: Not on file   Other Topics Concern   • Behavioral problems Not Asked   • Interpersonal relationships Not Asked   • Sad or not enjoying activities Not Asked   • Suicidal thoughts Not Asked   • Poor school performance Not Asked   • Reading difficulties Not Asked   • Speech difficulties Not Asked   • Writing difficulties Not Asked   • Inadequate sleep Not Asked   • Excessive TV viewing Not Asked   • Excessive video game use Not Asked   • Inadequate exercise Not Asked   • Sports related Not Asked   • Poor diet Not Asked   • Family concerns for drug/alcohol abuse Not Asked   • Poor oral hygiene Not Asked   • Bike safety Not Asked   • Family concerns vehicle safety Not Asked   Social History Narrative   • Not on file          Objective:     /64   Pulse 94   Temp 36.8 °C (98.3 °F) (Temporal)   Resp 14   Ht 1.626 m (5' 4\")   Wt (!) 129 kg (284 lb 6.4 oz)   LMP 10/01/2019 (Approximate)   SpO2 95%   BMI 48.82 kg/m²      Physical Exam  Vitals signs and nursing note reviewed.   Constitutional:       Appearance: Normal appearance. She is normal weight.   HENT:      Head: Normocephalic and atraumatic.      Right Ear: Tympanic membrane and external ear normal.      Left Ear: Tympanic membrane and external ear normal.      Nose: Congestion present.      Mouth/Throat:      Lips: Pink. Lesions present.   "      Mouth: Mucous membranes are moist.      Pharynx: Oropharynx is clear.     Eyes:      Extraocular Movements: Extraocular movements intact.      Pupils: Pupils are equal, round, and reactive to light.   Neck:      Musculoskeletal: Normal range of motion.   Cardiovascular:      Rate and Rhythm: Normal rate and regular rhythm.   Pulmonary:      Effort: Pulmonary effort is normal.      Breath sounds: Normal breath sounds.   Musculoskeletal: Normal range of motion.   Skin:     General: Skin is warm and dry.      Capillary Refill: Capillary refill takes less than 2 seconds.   Neurological:      General: No focal deficit present.      Mental Status: She is alert and oriented to person, place, and time. Mental status is at baseline.   Psychiatric:         Mood and Affect: Mood normal.         Speech: Speech normal.         Thought Content: Thought content normal.         Judgment: Judgment normal.                 Assessment/Plan:       1. Viral URI    2. Cold sore    Discussed with patient symptoms are viral in nature, there is low concern for any respiratory infection currently. Antibiotics are not advised at this time.  Warm salt water gargles  Alternate tylenol and ibuprofen for pain  Soft foods and cool liquids  Throat lozenges as directed  OTC abreva to help resolve cold sore  Encouraged rest, fluids and supportive care  Supportive care, differential diagnoses, and indications for immediate follow-up discussed with patient.    Pathogenesis of diagnosis discussed including typical length and natural progression.      Instructed to return to  or nearest emergency department if symptoms fail to improve, for any change in condition, further concerns, or new concerning symptoms.  Patient states understanding of the plan of care and discharge instructions.

## 2020-01-29 ENCOUNTER — HOSPITAL ENCOUNTER (EMERGENCY)
Dept: HOSPITAL 8 - ED | Age: 20
Discharge: HOME | End: 2020-01-29
Payer: MEDICAID

## 2020-01-29 VITALS — WEIGHT: 293 LBS | HEIGHT: 67 IN | BODY MASS INDEX: 45.99 KG/M2

## 2020-01-29 VITALS — DIASTOLIC BLOOD PRESSURE: 47 MMHG | SYSTOLIC BLOOD PRESSURE: 119 MMHG

## 2020-01-29 DIAGNOSIS — K29.00: Primary | ICD-10-CM

## 2020-01-29 DIAGNOSIS — R11.2: ICD-10-CM

## 2020-01-29 DIAGNOSIS — F17.210: ICD-10-CM

## 2020-01-29 LAB
ALBUMIN SERPL-MCNC: 2.7 G/DL (ref 3.4–5)
ALP SERPL-CCNC: 64 U/L (ref 45–117)
ALT SERPL-CCNC: 12 U/L (ref 12–78)
ANION GAP SERPL CALC-SCNC: 9 MMOL/L (ref 5–15)
BASOPHILS # BLD AUTO: 0.05 X10^3/UL (ref 0–0.1)
BASOPHILS NFR BLD AUTO: 1 % (ref 0–1)
BILIRUB SERPL-MCNC: 0.6 MG/DL (ref 0.2–1)
CALCIUM SERPL-MCNC: 8.7 MG/DL (ref 8.5–10.1)
CHLORIDE SERPL-SCNC: 109 MMOL/L (ref 98–107)
CREAT SERPL-MCNC: 0.56 MG/DL (ref 0.55–1.02)
EOSINOPHIL # BLD AUTO: 0.06 X10^3/UL (ref 0–0.4)
EOSINOPHIL NFR BLD AUTO: 1 % (ref 1–7)
ERYTHROCYTE [DISTWIDTH] IN BLOOD BY AUTOMATED COUNT: 13.3 % (ref 9.6–15.2)
LYMPHOCYTES # BLD AUTO: 2.45 X10^3/UL (ref 1–3.4)
LYMPHOCYTES NFR BLD AUTO: 27 % (ref 22–44)
MCH RBC QN AUTO: 28.4 PG (ref 27–34.8)
MCHC RBC AUTO-ENTMCNC: 33.7 G/DL (ref 32.4–35.8)
MCV RBC AUTO: 84.4 FL (ref 80–100)
MD: NO
MONOCYTES # BLD AUTO: 0.41 X10^3/UL (ref 0.2–0.8)
MONOCYTES NFR BLD AUTO: 5 % (ref 2–9)
NEUTROPHILS # BLD AUTO: 6.02 X10^3/UL (ref 1.8–6.8)
NEUTROPHILS NFR BLD AUTO: 67 % (ref 42–75)
PLATELET # BLD AUTO: 184 X10^3/UL (ref 130–400)
PMV BLD AUTO: 8.9 FL (ref 7.4–10.4)
PROT SERPL-MCNC: 6.8 G/DL (ref 6.4–8.2)
RBC # BLD AUTO: 4.4 X10^6/UL (ref 3.82–5.3)

## 2020-01-29 PROCEDURE — 99406 BEHAV CHNG SMOKING 3-10 MIN: CPT

## 2020-01-29 PROCEDURE — 83690 ASSAY OF LIPASE: CPT

## 2020-01-29 PROCEDURE — 99284 EMERGENCY DEPT VISIT MOD MDM: CPT

## 2020-01-29 PROCEDURE — 36415 COLL VENOUS BLD VENIPUNCTURE: CPT

## 2020-01-29 PROCEDURE — 80053 COMPREHEN METABOLIC PANEL: CPT

## 2020-01-29 PROCEDURE — 85025 COMPLETE CBC W/AUTO DIFF WBC: CPT

## 2020-01-29 PROCEDURE — 93005 ELECTROCARDIOGRAM TRACING: CPT

## 2020-01-29 NOTE — NUR
Pt alert and sitting up on gurney. Pt reports some improvement after GI 
cocktail but reports continued stomach cramping. 

Pt being medicated with benadryl and tylenol by AFSHIN Datlon 

Assumed care from AFSHIN Dalton

## 2020-01-29 NOTE — NUR
PT REPORTS EPIGASTRIC PAIN STARTING EARLIER TONIGHT WITH X2 EPISODES OF 
VOMITING, PT DENIES OTHER C/O AT THIS TIME. PT REPORTS BEING 17 WEEKS PREGNANT 
 4 PARA2. PT REPORTS HX  OF GASTRIC ULCERS, REPORTS TAKING PEPCID AROUND 
0300, GIVEN ZOFRAN 4MG EN ROUTE BY EMS. BS 76 PER EMS. PT CONNECTED TO 
MONITORING, CALL LIGHT WITHIN REACH, ALL SAFETY MEASURES IN PLACE. FAMILY AT 
BEDSIDE FOR SUPPORT.

## 2020-01-29 NOTE — NUR
PT d/c'd to self care. Pt awake, alert and oriented. NAD. Education provided on 
prescriptions, home care, follow-up and S/Sx to return. Pt VU. Pt ambulated out 
of ER.

## 2020-01-29 NOTE — NUR
Pt noted to be 78% RA while dozing off. Pt awakened. Pt reports she does have 
sleep apnea. Pt placed on 2.L NC for sleeping. 

-------------------------------------------------------------------------------

Addendum: 01/29/20 at 0417 by MRICH

-------------------------------------------------------------------------------

2.5L

## 2020-02-23 ENCOUNTER — HOSPITAL ENCOUNTER (OUTPATIENT)
Dept: HOSPITAL 8 - LDOP | Age: 20
Discharge: HOME | End: 2020-02-23
Attending: SPECIALIST
Payer: MEDICAID

## 2020-02-23 ENCOUNTER — HOSPITAL ENCOUNTER (EMERGENCY)
Dept: HOSPITAL 8 - ED | Age: 20
Discharge: HOME | End: 2020-02-23
Payer: MEDICAID

## 2020-02-23 VITALS — SYSTOLIC BLOOD PRESSURE: 132 MMHG | DIASTOLIC BLOOD PRESSURE: 73 MMHG

## 2020-02-23 VITALS — HEIGHT: 64 IN | BODY MASS INDEX: 49.95 KG/M2 | WEIGHT: 292.55 LBS

## 2020-02-23 DIAGNOSIS — E86.0: ICD-10-CM

## 2020-02-23 DIAGNOSIS — Z02.9: Primary | ICD-10-CM

## 2020-02-23 DIAGNOSIS — R10.32: ICD-10-CM

## 2020-02-23 DIAGNOSIS — O26.892: Primary | ICD-10-CM

## 2020-02-23 DIAGNOSIS — K21.9: ICD-10-CM

## 2020-02-23 DIAGNOSIS — R07.89: ICD-10-CM

## 2020-02-23 DIAGNOSIS — O99.332: ICD-10-CM

## 2020-02-23 DIAGNOSIS — R10.31: ICD-10-CM

## 2020-02-23 DIAGNOSIS — Z3A.22: ICD-10-CM

## 2020-02-23 LAB
ALBUMIN SERPL-MCNC: 2.6 G/DL (ref 3.4–5)
ANION GAP SERPL CALC-SCNC: 10 MMOL/L (ref 5–15)
BASOPHILS # BLD AUTO: 0.12 X10^3/UL (ref 0–0.1)
BASOPHILS NFR BLD AUTO: 2 % (ref 0–1)
CALCIUM SERPL-MCNC: 8.4 MG/DL (ref 8.5–10.1)
CHLORIDE SERPL-SCNC: 109 MMOL/L (ref 98–107)
CREAT SERPL-MCNC: 0.47 MG/DL (ref 0.55–1.02)
CULTURE INDICATED?: YES
EOSINOPHIL # BLD AUTO: 0.08 X10^3/UL (ref 0–0.4)
EOSINOPHIL NFR BLD AUTO: 1 % (ref 1–7)
ERYTHROCYTE [DISTWIDTH] IN BLOOD BY AUTOMATED COUNT: 14.1 % (ref 9.6–15.2)
LYMPHOCYTES # BLD AUTO: 1.87 X10^3/UL (ref 1–3.4)
LYMPHOCYTES NFR BLD AUTO: 25 % (ref 22–44)
MCH RBC QN AUTO: 28.4 PG (ref 27–34.8)
MCHC RBC AUTO-ENTMCNC: 33.7 G/DL (ref 32.4–35.8)
MCV RBC AUTO: 84.4 FL (ref 80–100)
MD: NO
MICROSCOPIC: (no result)
MONOCYTES # BLD AUTO: 0.46 X10^3/UL (ref 0.2–0.8)
MONOCYTES NFR BLD AUTO: 6 % (ref 2–9)
NEUTROPHILS # BLD AUTO: 4.95 X10^3/UL (ref 1.8–6.8)
NEUTROPHILS NFR BLD AUTO: 66 % (ref 42–75)
PLATELET # BLD AUTO: 178 X10^3/UL (ref 130–400)
PMV BLD AUTO: 8.7 FL (ref 7.4–10.4)
RBC # BLD AUTO: 4.13 X10^6/UL (ref 3.82–5.3)
TROPONIN I SERPL-MCNC: < 0.015 NG/ML (ref 0–0.04)

## 2020-02-23 PROCEDURE — 93005 ELECTROCARDIOGRAM TRACING: CPT

## 2020-02-23 PROCEDURE — 87086 URINE CULTURE/COLONY COUNT: CPT

## 2020-02-23 PROCEDURE — 87147 CULTURE TYPE IMMUNOLOGIC: CPT

## 2020-02-23 PROCEDURE — 80048 BASIC METABOLIC PNL TOTAL CA: CPT

## 2020-02-23 PROCEDURE — 99284 EMERGENCY DEPT VISIT MOD MDM: CPT

## 2020-02-23 PROCEDURE — 84484 ASSAY OF TROPONIN QUANT: CPT

## 2020-02-23 PROCEDURE — 36415 COLL VENOUS BLD VENIPUNCTURE: CPT

## 2020-02-23 PROCEDURE — 81001 URINALYSIS AUTO W/SCOPE: CPT

## 2020-02-23 PROCEDURE — 85379 FIBRIN DEGRADATION QUANT: CPT

## 2020-02-23 PROCEDURE — 82040 ASSAY OF SERUM ALBUMIN: CPT

## 2020-02-23 PROCEDURE — 85025 COMPLETE CBC W/AUTO DIFF WBC: CPT

## 2020-02-23 NOTE — NUR
Pt 22 weeks pregnant with 4th pregnancy. Pt reports receiving OB care. OB 
instructing pt to wean off from cigarettes. Education from RN and ERMD 
regarding smoking and smoking while pregnant in particular.

## 2020-02-23 NOTE — NUR
PT SITTING UP WATCHING TELEVISION, CUDDLING WITH PARTNER IN THE BED. NAD NOTED 
AT THIS TIME. SIDE RAIL UP, CALL LIGHT IN REACH.

## 2020-02-23 NOTE — NUR
Pt presents to ed with CP x15 min today and lower abdominal cramping x2-3 days 
when pt needs to urinate and following urination. Pt reports hx of acid reflux, 
which she experiences symptoms from daily. Relief with home medication. Pt 
ambulates well to bathroom for UA sample. Back to bed. ERMD at bedside for 
assessment. Side rail up, call light in reach. Partner at bedside.

## 2020-03-02 ENCOUNTER — HOSPITAL ENCOUNTER (EMERGENCY)
Dept: HOSPITAL 8 - ED | Age: 20
Discharge: LEFT BEFORE DISCHARGE | End: 2020-03-02
Payer: MEDICAID

## 2020-03-02 ENCOUNTER — HOSPITAL ENCOUNTER (OUTPATIENT)
Dept: HOSPITAL 8 - LDOP | Age: 20
Discharge: HOME | End: 2020-03-02
Attending: SPECIALIST
Payer: MEDICAID

## 2020-03-02 ENCOUNTER — HOSPITAL ENCOUNTER (EMERGENCY)
Dept: HOSPITAL 8 - ED | Age: 20
Discharge: HOME | End: 2020-03-02
Payer: MEDICAID

## 2020-03-02 VITALS — HEIGHT: 64 IN | BODY MASS INDEX: 49.76 KG/M2 | WEIGHT: 291.45 LBS

## 2020-03-02 VITALS — SYSTOLIC BLOOD PRESSURE: 129 MMHG | DIASTOLIC BLOOD PRESSURE: 73 MMHG

## 2020-03-02 VITALS — BODY MASS INDEX: 49.76 KG/M2 | WEIGHT: 291.45 LBS | HEIGHT: 64 IN

## 2020-03-02 VITALS — SYSTOLIC BLOOD PRESSURE: 147 MMHG | DIASTOLIC BLOOD PRESSURE: 65 MMHG

## 2020-03-02 VITALS — HEIGHT: 64 IN | BODY MASS INDEX: 49.64 KG/M2 | WEIGHT: 290.79 LBS

## 2020-03-02 VITALS — DIASTOLIC BLOOD PRESSURE: 75 MMHG | SYSTOLIC BLOOD PRESSURE: 13 MMHG

## 2020-03-02 DIAGNOSIS — Z3A.20: ICD-10-CM

## 2020-03-02 DIAGNOSIS — R10.9: ICD-10-CM

## 2020-03-02 DIAGNOSIS — Z53.21: ICD-10-CM

## 2020-03-02 DIAGNOSIS — K21.9: ICD-10-CM

## 2020-03-02 DIAGNOSIS — R10.9: Primary | ICD-10-CM

## 2020-03-02 DIAGNOSIS — Z88.0: ICD-10-CM

## 2020-03-02 DIAGNOSIS — O26.892: Primary | ICD-10-CM

## 2020-03-02 DIAGNOSIS — Z3A.21: ICD-10-CM

## 2020-03-02 DIAGNOSIS — O99.612: ICD-10-CM

## 2020-03-02 DIAGNOSIS — O21.8: Primary | ICD-10-CM

## 2020-03-02 DIAGNOSIS — R11.10: ICD-10-CM

## 2020-03-02 LAB
ALBUMIN SERPL-MCNC: 2.8 G/DL (ref 3.4–5)
ANION GAP SERPL CALC-SCNC: 8 MMOL/L (ref 5–15)
BASOPHILS # BLD AUTO: 0.04 X10^3/UL (ref 0–0.1)
BASOPHILS NFR BLD AUTO: 0 % (ref 0–1)
CALCIUM SERPL-MCNC: 8.4 MG/DL (ref 8.5–10.1)
CHLORIDE SERPL-SCNC: 110 MMOL/L (ref 98–107)
CREAT SERPL-MCNC: 0.5 MG/DL (ref 0.55–1.02)
CULTURE INDICATED?: YES
EOSINOPHIL # BLD AUTO: 0.04 X10^3/UL (ref 0–0.4)
EOSINOPHIL NFR BLD AUTO: 0 % (ref 1–7)
ERYTHROCYTE [DISTWIDTH] IN BLOOD BY AUTOMATED COUNT: 13.9 % (ref 9.6–15.2)
LYMPHOCYTES # BLD AUTO: 1 X10^3/UL (ref 1–3.4)
LYMPHOCYTES NFR BLD AUTO: 11 % (ref 22–44)
MCH RBC QN AUTO: 28.6 PG (ref 27–34.8)
MCHC RBC AUTO-ENTMCNC: 33.5 G/DL (ref 32.4–35.8)
MCV RBC AUTO: 85.5 FL (ref 80–100)
MD: NO
MICROSCOPIC: (no result)
MONOCYTES # BLD AUTO: 0.4 X10^3/UL (ref 0.2–0.8)
MONOCYTES NFR BLD AUTO: 5 % (ref 2–9)
NEUTROPHILS # BLD AUTO: 7.37 X10^3/UL (ref 1.8–6.8)
NEUTROPHILS NFR BLD AUTO: 83 % (ref 42–75)
PLATELET # BLD AUTO: 164 X10^3/UL (ref 130–400)
PMV BLD AUTO: 9.1 FL (ref 7.4–10.4)
RBC # BLD AUTO: 4.43 X10^6/UL (ref 3.82–5.3)

## 2020-03-02 PROCEDURE — 96375 TX/PRO/DX INJ NEW DRUG ADDON: CPT

## 2020-03-02 PROCEDURE — 80048 BASIC METABOLIC PNL TOTAL CA: CPT

## 2020-03-02 PROCEDURE — 87086 URINE CULTURE/COLONY COUNT: CPT

## 2020-03-02 PROCEDURE — 96374 THER/PROPH/DIAG INJ IV PUSH: CPT

## 2020-03-02 PROCEDURE — 85025 COMPLETE CBC W/AUTO DIFF WBC: CPT

## 2020-03-02 PROCEDURE — 82040 ASSAY OF SERUM ALBUMIN: CPT

## 2020-03-02 PROCEDURE — 99284 EMERGENCY DEPT VISIT MOD MDM: CPT

## 2020-03-02 PROCEDURE — G0463 HOSPITAL OUTPT CLINIC VISIT: HCPCS

## 2020-03-02 PROCEDURE — 99211 OFF/OP EST MAY X REQ PHY/QHP: CPT

## 2020-03-02 PROCEDURE — 81001 URINALYSIS AUTO W/SCOPE: CPT

## 2020-03-02 PROCEDURE — 87147 CULTURE TYPE IMMUNOLOGIC: CPT

## 2020-03-02 PROCEDURE — 36415 COLL VENOUS BLD VENIPUNCTURE: CPT

## 2020-03-02 PROCEDURE — 83690 ASSAY OF LIPASE: CPT

## 2020-05-06 ENCOUNTER — HOSPITAL ENCOUNTER (OUTPATIENT)
Facility: MEDICAL CENTER | Age: 20
End: 2020-05-06
Attending: SPECIALIST | Admitting: SPECIALIST
Payer: MEDICAID

## 2020-05-06 VITALS
HEIGHT: 64 IN | DIASTOLIC BLOOD PRESSURE: 61 MMHG | RESPIRATION RATE: 18 BRPM | OXYGEN SATURATION: 98 % | WEIGHT: 293 LBS | HEART RATE: 98 BPM | SYSTOLIC BLOOD PRESSURE: 132 MMHG | TEMPERATURE: 96.5 F | BODY MASS INDEX: 50.02 KG/M2

## 2020-05-06 LAB — CRYSTALS AMN MICRO: NORMAL

## 2020-05-06 PROCEDURE — 89060 EXAM SYNOVIAL FLUID CRYSTALS: CPT

## 2020-05-06 PROCEDURE — 59025 FETAL NON-STRESS TEST: CPT

## 2020-05-06 ASSESSMENT — PAIN SCALES - GENERAL: PAINLEVEL: 2

## 2020-05-06 ASSESSMENT — FIBROSIS 4 INDEX: FIB4 SCORE: 0.3

## 2020-05-06 NOTE — PROGRESS NOTES
"Pt presents to L&D with complaints of LOF and decreased FM. Pt ambulated to S230 for assessment.     1416 TOCO and EFM applied, VSS. Pt states that she noticed an increase in her discharge as of yesterday morning but did not think much of it at the time. Pt states she started having back pain last night and started to wonder if maybe her water broke. Pt states she has placed a \"piddle pad\" this morning and has noticed it wet each time she has gone to the restroom. Pt states she has had problems with leaky bladder before but this doesn't seem to be the same. Pt states she also has not noticed that baby moving as much today as normal. While RN at bedside, audible FM heard by RN, pt denies being able to feel this FM.     1445 RN at bedside, SSE performed, small amount of clear/white discharge seen, no obvious signs of SROM. BERNARDINON collected.     1525 Piedad with SW notified of pt being in hospital.     1604 SOCORRO negative.     1605 Dr. Rodriguez updated on pt status. Orders to check SVE, if closed okay to discharge home and have her follow up with Dr. Rodriguez tomorrow in the office.     1615 Message left with Piedad with SW regarding pt being discharged.     1620 RN at bedside, POC discussed.  labor precautions given and all questions answered. Pt instructed to follow up with Dr. Rodriguez in the morning. Pt verbalized understanding.     1625 Pt discharged home in stable condition.       "

## 2020-05-23 ENCOUNTER — HOSPITAL ENCOUNTER (EMERGENCY)
Facility: MEDICAL CENTER | Age: 20
End: 2020-05-23
Attending: EMERGENCY MEDICINE | Admitting: EMERGENCY MEDICINE
Payer: MEDICAID

## 2020-05-23 VITALS
TEMPERATURE: 97 F | WEIGHT: 293 LBS | HEART RATE: 70 BPM | OXYGEN SATURATION: 99 % | DIASTOLIC BLOOD PRESSURE: 49 MMHG | HEIGHT: 64 IN | RESPIRATION RATE: 20 BRPM | BODY MASS INDEX: 50.02 KG/M2 | SYSTOLIC BLOOD PRESSURE: 128 MMHG

## 2020-05-23 DIAGNOSIS — K21.9 GASTROESOPHAGEAL REFLUX DISEASE, ESOPHAGITIS PRESENCE NOT SPECIFIED: ICD-10-CM

## 2020-05-23 DIAGNOSIS — R11.2 NON-INTRACTABLE VOMITING WITH NAUSEA, UNSPECIFIED VOMITING TYPE: ICD-10-CM

## 2020-05-23 PROCEDURE — 99284 EMERGENCY DEPT VISIT MOD MDM: CPT

## 2020-05-23 PROCEDURE — A9270 NON-COVERED ITEM OR SERVICE: HCPCS | Performed by: EMERGENCY MEDICINE

## 2020-05-23 PROCEDURE — 700102 HCHG RX REV CODE 250 W/ 637 OVERRIDE(OP): Performed by: EMERGENCY MEDICINE

## 2020-05-23 PROCEDURE — 700111 HCHG RX REV CODE 636 W/ 250 OVERRIDE (IP): Performed by: EMERGENCY MEDICINE

## 2020-05-23 RX ORDER — ONDANSETRON 4 MG/1
4 TABLET, ORALLY DISINTEGRATING ORAL ONCE
Status: COMPLETED | OUTPATIENT
Start: 2020-05-23 | End: 2020-05-23

## 2020-05-23 RX ADMIN — LIDOCAINE HYDROCHLORIDE 30 ML: 20 SOLUTION OROPHARYNGEAL at 15:25

## 2020-05-23 RX ADMIN — ONDANSETRON 4 MG: 4 TABLET, ORALLY DISINTEGRATING ORAL at 15:24

## 2020-05-23 ASSESSMENT — PAIN DESCRIPTION - DESCRIPTORS: DESCRIPTORS: ACHING

## 2020-05-23 ASSESSMENT — FIBROSIS 4 INDEX: FIB4 SCORE: 0.3

## 2020-05-23 NOTE — ED NOTES
Pt given discharge instructions. RN answered questions. VSS. Pt ambulated steadily out to [facility].

## 2020-05-23 NOTE — ED TRIAGE NOTES
"Pt presents complaining of acute onset of diffuse upper abdominal pain with associated transitory N/V recurring since this AM. She states, incidentally that she is 32 week pregnant, and denies similar past symptomatology.   Pt denies any domestic high risk areas, or international travel within the past 14 days.  He has been encouraged to keep the cell phone readily available since a pharmacy tech is likely to call to reconcile home medications.  The pharmacy of choice has been updated.   Chief Complaint   Patient presents with   • Abdominal Pain   • N/V   • Pregnancy       /65   Pulse 70   Temp 36.1 °C (97 °F) (Temporal)   Resp 20   Ht 1.626 m (5' 4\")   Wt (!) 144 kg (317 lb 7.4 oz)   LMP 10/01/2019 (Approximate)   SpO2 99%   BMI 54.49 kg/m²     "

## 2020-05-23 NOTE — ED PROVIDER NOTES
ED Provider Note    CHIEF COMPLAINT  Chief Complaint   Patient presents with   • Abdominal Pain   • N/V   • Pregnancy       HPI  Mitzi Abel is a 19 y.o. female who presents with epigastric abdominal pain and heartburn.  The pain radiates to her back behind her shoulder blades.  She has had this multiple times in the past it started last October.  She has been told she has gastritis and heartburn.  She is also 32 weeks pregnant.  She says it was worse this morning around 9 AM and she took a famotidine which made it get better.  And then it came on again quite strong over the last hour she took another famotidine while she is at the store about 45 minutes ago.  She had an episode of vomiting here in the emergency department but she currently is feeling significantly better.  She says she came in for evaluation because the last time she had this bad of an attack she had decreased fetal movements.  She denies any lower abdominal pain she denies any cramping she denies any bleeding or leakage of fluids.    REVIEW OF SYSTEMS  Positive for epigastric abdominal pain/heartburn, negative for abdominal cramping vaginal bleeding vaginal discharge.     PAST MEDICAL HISTORY   has a past medical history of Anxiety, Bipolar 2 disorder, Borderline personality disorder (HCC), Eczema (10/15/2010), H/O: Bell's palsy (12/26/08), Major depressive disorder, Myopia (10/15/2010), and Obesity.    SOCIAL HISTORY  Social History     Tobacco Use   • Smoking status: Former Smoker     Packs/day: 0.50     Years: 11.00     Pack years: 5.50     Types: Cigarettes   • Smokeless tobacco: Never Used   Substance and Sexual Activity   • Alcohol use: Not Currently   • Drug use: Not Currently     Comment: Hx of meth. last use 10/16   • Sexual activity: Yes     Partners: Male     Comment:        SURGICAL HISTORY  patient denies any surgical history    CURRENT MEDICATIONS  Home Medications    **Home medications have not yet been reviewed  "for this encounter**         ALLERGIES  Allergies   Allergen Reactions   • Penicillins        PHYSICAL EXAM  VITAL SIGNS: /49   Pulse 70   Temp 36.1 °C (97 °F) (Temporal)   Resp 20   Ht 1.626 m (5' 4\")   Wt (!) 144 kg (317 lb 7.4 oz)   LMP 10/01/2019 (Approximate)   SpO2 99%   BMI 54.49 kg/m²   Constitutional: Alert in no apparent distress.  HENT: Normocephalic, Atraumatic, Bilateral external ears normal. Nose normal.   Eyes: Pupils are equal and reactive. Conjunctiva normal, non-icteric.   Heart: Regular rate and rythm, no murmurs.    Lungs: Clear to auscultation bilaterally.  Epigastric tenderness, abdomen is gravid  Skin: Warm, Dry, No erythema, No rash.   Neurologic: Alert, Grossly non-focal.   Psychiatric: Affect normal, Judgment normal, Mood normal, Appears appropriate and not intoxicated.   Extremities: Intact distal pulses, No edema, No tenderness    DIAGNOSTIC STUDIES / PROCEDURES      COURSE & MEDICAL DECISION MAKING  Pertinent Labs & Imaging studies reviewed. (See chart for details)  This is a 19-year-old female who is 32 weeks pregnant presenting with epigastric abdominal pain radiating to her back.  I suspect that this is more gastritis/heartburn especially since it is getting better while she was talking to me.  I recommended she take famotidine twice a day as directed regardless of her symptoms.  She will be given a Zofran and a GI cocktail.  I did inform her that we do not have any fetal monitoring capacity at this hospital and she should go to L&D for monitoring if she has decreased fetal movement.  She denies feeling decreased fetal movement at this time however.  Given that her symptoms are improving she will be discharged.     The patient will return for new or worsening symptoms and is stable at the time of discharge. Patient was given return precautions. Patient and/or family member verbalizes understanding and will comply.    DISPOSITION:  Patient will be discharged home in " stable condition.    FOLLOW UP:  [facility], Emergency Dept  [address]  [place] [id number] [phone]    Return for worsening pain fever or other concerns.  Please go to labor and delivery for evaluation of your pregnancy.        OUTPATIENT MEDICATIONS:  Discharge Medication List as of 5/23/2020  3:39 PM          FINAL IMPRESSION  1. Non-intractable vomiting with nausea, unspecified vomiting type     2. Gastroesophageal reflux disease, esophagitis presence not specified         2.   3.     This dictation has been creating using voice recognition software. The accuracy of the dictation is limited the abilities of the software.  I expect there may be some errors of grammar and possibly content. I made every attempt to manually correct the errors within my dictation. However errors related to this voice recognition software may still exist and should be interpreted within the appropriate context.        The note accurately reflects work and decisions made by me.  Glenny Hubbard M.D.  5/23/2020  6:49 PM

## 2020-07-01 NOTE — H&P
DATE OF ADMISSION:  07/15/2020    REASON FOR ADMISSION:  Augmentation of labor with Pitocin per protocol.    HISTORY OF PRESENT ILLNESS:  This is a 19-year-old  4, para 1,   spontaneous  2, at 40 and 1/7th weeks' gestation based on a 6-week   ultrasound, who states that she now wishes to proceed forward with an   augmentation of labor with Pitocin per protocol.  Risks, benefits and   alternatives have been addressed.  She has asked appropriate questions, signed   the appropriate consents and wishes to proceed forward with admission as   planned.  Of note, she is group B strep positive and will be started on   cefazolin as she does have a PENICILLIN ALLERGY.    PAST MEDICAL HISTORY:  Remarkable for asthma, hepatitis, bipolar disorder,   posttraumatic stress disorder, smoker, history of methamphetamines.    PAST SURGICAL HISTORY:  None.    OBSTETRICAL HISTORY:  In 2018, at 40 and 5/7th weeks gestation, after 16-hour   labor, the patient had a spontaneous vaginal delivery, 6 pound 7 ounce infant.    This is her fourth pregnancy.  She has had 2 previous spontaneous abortions.    SOCIAL HISTORY:  She denies the use of any alcohol, tobacco, or recreational   drug use and as stated above, she was smoking a third of a pack of cigarettes   per day.  She also does report methamphetamine use in the early part of this   pregnancy up until she found out she was pregnant at approximately 1 month or   4 weeks along.    MEDICATIONS:  Prenatal vitamins.  She was given ceftriaxone and azithromycin   for a positive gonorrhea culture with a negative test of cure.    ALLERGIES:  TO PENICILLIN.    PHYSICAL EXAMINATION:  GENERAL:  She is afebrile, hemodynamically stable.  CURRENT VITAL SIGNS:  Can be seen in electronic medical record.  HEART:  Regular rate and rhythm.  CHEST:  Clear to auscultation bilaterally.  ABDOMEN:  Soft, morbidly obese, nontender.  PELVIC:  Shows 1, 50%, -2 to -3 station.  EXTREMITIES:   Nontender.    LABORATORY DATA:  Prenatal care labs are all in order.  She is group B strep   positive.    ASSESSMENT AND PLAN:  A 19-year-old  4, para 1, at 40 and 1/7th weeks'   gestation now electing to proceed forward with augmentation of labor with   Pitocin per protocol.  She will also be started on antibiotics per the group B   strep protocol in the form of cefazolin, as she has a PENICILLIN ALLERGY.             ____________________________________     MD JORDAN JUAREZ / NTS    DD:  2020 13:41:24  DT:  2020 13:56:25    D#:  [id number]  Job#:  [id number]

## 2020-07-01 NOTE — H&P
This version of the note has been redacted during the course of a chart correction case. If you need access to the original text of this version of the note, please contact the Health Information Management department at [phone].

## 2020-07-12 ENCOUNTER — HOSPITAL ENCOUNTER (OUTPATIENT)
Facility: MEDICAL CENTER | Age: 20
End: 2020-07-12
Attending: SPECIALIST | Admitting: SPECIALIST
Payer: MEDICAID

## 2020-07-12 ENCOUNTER — APPOINTMENT (OUTPATIENT)
Dept: OBGYN | Facility: MEDICAL CENTER | Age: 20
End: 2020-07-12
Attending: SPECIALIST
Payer: MEDICAID

## 2020-07-12 VITALS — WEIGHT: 293 LBS | BODY MASS INDEX: 50.02 KG/M2 | HEIGHT: 64 IN

## 2020-07-12 LAB
COVID ORDER STATUS COVID19: NORMAL
SARS-COV-2 RNA RESP QL NAA+PROBE: NOTDETECTED
SPECIMEN SOURCE: NORMAL

## 2020-07-12 PROCEDURE — U0003 INFECTIOUS AGENT DETECTION BY NUCLEIC ACID (DNA OR RNA); SEVERE ACUTE RESPIRATORY SYNDROME CORONAVIRUS 2 (SARS-COV-2) (CORONAVIRUS DISEASE [COVID-19]), AMPLIFIED PROBE TECHNIQUE, MAKING USE OF HIGH THROUGHPUT TECHNOLOGIES AS DESCRIBED BY CMS-2020-01-R: HCPCS

## 2020-07-12 PROCEDURE — C9803 HOPD COVID-19 SPEC COLLECT: HCPCS | Performed by: SPECIALIST

## 2020-07-12 ASSESSMENT — FIBROSIS 4 INDEX: FIB4 SCORE: 0.3

## 2020-07-13 ENCOUNTER — HOSPITAL ENCOUNTER (OUTPATIENT)
Facility: MEDICAL CENTER | Age: 20
End: 2020-07-14
Attending: SPECIALIST | Admitting: SPECIALIST
Payer: MEDICAID

## 2020-07-14 VITALS
WEIGHT: 293 LBS | DIASTOLIC BLOOD PRESSURE: 61 MMHG | HEIGHT: 64 IN | SYSTOLIC BLOOD PRESSURE: 134 MMHG | OXYGEN SATURATION: 94 % | HEART RATE: 92 BPM | RESPIRATION RATE: 15 BRPM | TEMPERATURE: 97.5 F | BODY MASS INDEX: 50.02 KG/M2

## 2020-07-14 PROCEDURE — 59025 FETAL NON-STRESS TEST: CPT

## 2020-07-14 ASSESSMENT — FIBROSIS 4 INDEX: FIB4 SCORE: 0.3

## 2020-07-14 NOTE — PROGRESS NOTES
2338- Pt to LnD for contractions. She is 39.6 weeks with an EDC of 7/13/20. She denies VB, LOF and feels +FM. VSS, TOCO and US applied.   SVE done, 2/40/-3.   0111- RN calls Dr Rodriguez regarding pt and BP. Orders to recheck pt and send home once reactive NST obtained and if pts cervix has not changed.   0120- SVE remains the same at 2/40/-3.   0144- Reactive NST obtained.   0150- Discharge instructions given to pt. Pt verbalizes understanding. Pt ambulates out of triage.

## 2020-07-15 ENCOUNTER — HOSPITAL ENCOUNTER (INPATIENT)
Facility: MEDICAL CENTER | Age: 20
LOS: 2 days | End: 2020-07-17
Attending: SPECIALIST | Admitting: SPECIALIST
Payer: MEDICAID

## 2020-07-15 ENCOUNTER — HOSPITAL ENCOUNTER (OUTPATIENT)
Dept: OBGYN | Facility: MEDICAL CENTER | Age: 20
End: 2020-07-15
Attending: SPECIALIST | Admitting: SPECIALIST
Payer: MEDICAID

## 2020-07-15 ENCOUNTER — ANESTHESIA EVENT (OUTPATIENT)
Dept: ANESTHESIOLOGY | Facility: MEDICAL CENTER | Age: 20
End: 2020-07-15
Payer: MEDICAID

## 2020-07-15 ENCOUNTER — ANESTHESIA (OUTPATIENT)
Dept: ANESTHESIOLOGY | Facility: MEDICAL CENTER | Age: 20
End: 2020-07-15
Payer: MEDICAID

## 2020-07-15 LAB
ANISOCYTOSIS BLD QL SMEAR: ABNORMAL
BASOPHILS # BLD AUTO: 0 % (ref 0–1.8)
BASOPHILS # BLD: 0 K/UL (ref 0–0.12)
EOSINOPHIL # BLD AUTO: 0.08 K/UL (ref 0–0.51)
EOSINOPHIL NFR BLD: 0.9 % (ref 0–6.9)
ERYTHROCYTE [DISTWIDTH] IN BLOOD BY AUTOMATED COUNT: 42.2 FL (ref 35.9–50)
HCT VFR BLD AUTO: 38.1 % (ref 37–47)
HGB BLD-MCNC: 12.6 G/DL (ref 12–16)
HOLDING TUBE BB 8507: NORMAL
LYMPHOCYTES # BLD AUTO: 2.56 K/UL (ref 1–4.8)
LYMPHOCYTES NFR BLD: 28.4 % (ref 22–41)
MANUAL DIFF BLD: NORMAL
MCH RBC QN AUTO: 28.6 PG (ref 27–33)
MCHC RBC AUTO-ENTMCNC: 33.1 G/DL (ref 33.6–35)
MCV RBC AUTO: 86.6 FL (ref 81.4–97.8)
MICROCYTES BLD QL SMEAR: ABNORMAL
MONOCYTES # BLD AUTO: 0.54 K/UL (ref 0–0.85)
MONOCYTES NFR BLD AUTO: 6 % (ref 0–13.4)
MORPHOLOGY BLD-IMP: NORMAL
NEUTROPHILS # BLD AUTO: 5.82 K/UL (ref 2–7.15)
NEUTROPHILS NFR BLD: 63.8 % (ref 44–72)
NEUTS BAND NFR BLD MANUAL: 0.9 % (ref 0–10)
NRBC # BLD AUTO: 0 K/UL
NRBC BLD-RTO: 0 /100 WBC
PLATELET # BLD AUTO: 156 K/UL (ref 164–446)
PLATELET BLD QL SMEAR: NORMAL
PMV BLD AUTO: 10.7 FL (ref 9–12.9)
RBC # BLD AUTO: 4.4 M/UL (ref 4.2–5.4)
RBC BLD AUTO: PRESENT
WBC # BLD AUTO: 9 K/UL (ref 4.8–10.8)

## 2020-07-15 PROCEDURE — 304965 HCHG RECOVERY SERVICES

## 2020-07-15 PROCEDURE — 700111 HCHG RX REV CODE 636 W/ 250 OVERRIDE (IP): Performed by: ANESTHESIOLOGY

## 2020-07-15 PROCEDURE — 700111 HCHG RX REV CODE 636 W/ 250 OVERRIDE (IP)

## 2020-07-15 PROCEDURE — 10H073Z INSERTION OF MONITORING ELECTRODE INTO PRODUCTS OF CONCEPTION, VIA NATURAL OR ARTIFICIAL OPENING: ICD-10-PCS | Performed by: SPECIALIST

## 2020-07-15 PROCEDURE — 700111 HCHG RX REV CODE 636 W/ 250 OVERRIDE (IP): Performed by: SPECIALIST

## 2020-07-15 PROCEDURE — 10H07YZ INSERTION OF OTHER DEVICE INTO PRODUCTS OF CONCEPTION, VIA NATURAL OR ARTIFICIAL OPENING: ICD-10-PCS | Performed by: SPECIALIST

## 2020-07-15 PROCEDURE — A9270 NON-COVERED ITEM OR SERVICE: HCPCS | Performed by: SPECIALIST

## 2020-07-15 PROCEDURE — 0UC97ZZ EXTIRPATION OF MATTER FROM UTERUS, VIA NATURAL OR ARTIFICIAL OPENING: ICD-10-PCS | Performed by: SPECIALIST

## 2020-07-15 PROCEDURE — 85027 COMPLETE CBC AUTOMATED: CPT

## 2020-07-15 PROCEDURE — 59409 OBSTETRICAL CARE: CPT

## 2020-07-15 PROCEDURE — 770002 HCHG ROOM/CARE - OB PRIVATE (112)

## 2020-07-15 PROCEDURE — 3E033VJ INTRODUCTION OF OTHER HORMONE INTO PERIPHERAL VEIN, PERCUTANEOUS APPROACH: ICD-10-PCS | Performed by: SPECIALIST

## 2020-07-15 PROCEDURE — 85007 BL SMEAR W/DIFF WBC COUNT: CPT

## 2020-07-15 PROCEDURE — 700105 HCHG RX REV CODE 258: Performed by: ANESTHESIOLOGY

## 2020-07-15 PROCEDURE — 10907ZC DRAINAGE OF AMNIOTIC FLUID, THERAPEUTIC FROM PRODUCTS OF CONCEPTION, VIA NATURAL OR ARTIFICIAL OPENING: ICD-10-PCS | Performed by: SPECIALIST

## 2020-07-15 PROCEDURE — 700102 HCHG RX REV CODE 250 W/ 637 OVERRIDE(OP): Performed by: SPECIALIST

## 2020-07-15 PROCEDURE — 700105 HCHG RX REV CODE 258: Performed by: SPECIALIST

## 2020-07-15 PROCEDURE — 36415 COLL VENOUS BLD VENIPUNCTURE: CPT

## 2020-07-15 PROCEDURE — 700101 HCHG RX REV CODE 250: Performed by: SPECIALIST

## 2020-07-15 PROCEDURE — 4A1H7CZ MONITORING OF PRODUCTS OF CONCEPTION, CARDIAC RATE, VIA NATURAL OR ARTIFICIAL OPENING: ICD-10-PCS | Performed by: SPECIALIST

## 2020-07-15 RX ORDER — ONDANSETRON 4 MG/1
4 TABLET, ORALLY DISINTEGRATING ORAL EVERY 6 HOURS PRN
Status: DISCONTINUED | OUTPATIENT
Start: 2020-07-15 | End: 2020-07-17 | Stop reason: HOSPADM

## 2020-07-15 RX ORDER — ROPIVACAINE HYDROCHLORIDE 2 MG/ML
INJECTION, SOLUTION EPIDURAL; INFILTRATION; PERINEURAL CONTINUOUS
Status: DISCONTINUED | OUTPATIENT
Start: 2020-07-15 | End: 2020-07-16

## 2020-07-15 RX ORDER — HYDROCODONE BITARTRATE AND ACETAMINOPHEN 5; 325 MG/1; MG/1
1 TABLET ORAL EVERY 4 HOURS PRN
Status: DISCONTINUED | OUTPATIENT
Start: 2020-07-15 | End: 2020-07-17 | Stop reason: HOSPADM

## 2020-07-15 RX ORDER — CITRIC ACID/SODIUM CITRATE 334-500MG
30 SOLUTION, ORAL ORAL EVERY 6 HOURS PRN
Status: DISCONTINUED | OUTPATIENT
Start: 2020-07-15 | End: 2020-07-16 | Stop reason: HOSPADM

## 2020-07-15 RX ORDER — IBUPROFEN 600 MG/1
600 TABLET ORAL EVERY 6 HOURS PRN
Status: DISCONTINUED | OUTPATIENT
Start: 2020-07-15 | End: 2020-07-17 | Stop reason: HOSPADM

## 2020-07-15 RX ORDER — TERBUTALINE SULFATE 1 MG/ML
0.25 INJECTION, SOLUTION SUBCUTANEOUS PRN
Status: DISCONTINUED | OUTPATIENT
Start: 2020-07-15 | End: 2020-07-16 | Stop reason: HOSPADM

## 2020-07-15 RX ORDER — ONDANSETRON 2 MG/ML
4 INJECTION INTRAMUSCULAR; INTRAVENOUS EVERY 6 HOURS PRN
Status: DISCONTINUED | OUTPATIENT
Start: 2020-07-15 | End: 2020-07-17 | Stop reason: HOSPADM

## 2020-07-15 RX ORDER — SODIUM CHLORIDE, SODIUM LACTATE, POTASSIUM CHLORIDE, CALCIUM CHLORIDE 600; 310; 30; 20 MG/100ML; MG/100ML; MG/100ML; MG/100ML
1000 INJECTION, SOLUTION INTRAVENOUS CONTINUOUS
Status: DISCONTINUED | OUTPATIENT
Start: 2020-07-15 | End: 2020-07-16

## 2020-07-15 RX ORDER — CLINDAMYCIN PHOSPHATE 900 MG/50ML
900 INJECTION, SOLUTION INTRAVENOUS EVERY 8 HOURS
Status: DISCONTINUED | OUTPATIENT
Start: 2020-07-15 | End: 2020-07-16

## 2020-07-15 RX ORDER — BUPIVACAINE HYDROCHLORIDE 2.5 MG/ML
INJECTION, SOLUTION EPIDURAL; INFILTRATION; INTRACAUDAL PRN
Status: DISCONTINUED | OUTPATIENT
Start: 2020-07-15 | End: 2020-07-15 | Stop reason: SURG

## 2020-07-15 RX ORDER — SODIUM CHLORIDE, SODIUM LACTATE, POTASSIUM CHLORIDE, AND CALCIUM CHLORIDE .6; .31; .03; .02 G/100ML; G/100ML; G/100ML; G/100ML
250 INJECTION, SOLUTION INTRAVENOUS PRN
Status: DISCONTINUED | OUTPATIENT
Start: 2020-07-15 | End: 2020-07-16 | Stop reason: HOSPADM

## 2020-07-15 RX ORDER — SODIUM CHLORIDE, SODIUM LACTATE, POTASSIUM CHLORIDE, AND CALCIUM CHLORIDE .6; .31; .03; .02 G/100ML; G/100ML; G/100ML; G/100ML
1000 INJECTION, SOLUTION INTRAVENOUS
Status: COMPLETED | OUTPATIENT
Start: 2020-07-15 | End: 2020-07-16

## 2020-07-15 RX ORDER — HYDROCODONE BITARTRATE AND ACETAMINOPHEN 5; 325 MG/1; MG/1
2 TABLET ORAL EVERY 4 HOURS PRN
Status: DISCONTINUED | OUTPATIENT
Start: 2020-07-15 | End: 2020-07-17 | Stop reason: HOSPADM

## 2020-07-15 RX ORDER — CITRIC ACID/SODIUM CITRATE 334-500MG
SOLUTION, ORAL ORAL
Status: ACTIVE
Start: 2020-07-15 | End: 2020-07-15

## 2020-07-15 RX ORDER — CEFAZOLIN SODIUM 1 G/50ML
1 INJECTION, SOLUTION INTRAVENOUS EVERY 8 HOURS
Status: DISCONTINUED | OUTPATIENT
Start: 2020-07-15 | End: 2020-07-15

## 2020-07-15 RX ORDER — CEFAZOLIN SODIUM 2 G/100ML
2 INJECTION, SOLUTION INTRAVENOUS ONCE
Status: DISCONTINUED | OUTPATIENT
Start: 2020-07-15 | End: 2020-07-15

## 2020-07-15 RX ORDER — MISOPROSTOL 200 UG/1
800 TABLET ORAL
Status: COMPLETED | OUTPATIENT
Start: 2020-07-15 | End: 2020-07-15

## 2020-07-15 RX ORDER — HYDROXYZINE 50 MG/1
50 TABLET, FILM COATED ORAL EVERY 6 HOURS PRN
Status: DISCONTINUED | OUTPATIENT
Start: 2020-07-15 | End: 2020-07-16 | Stop reason: HOSPADM

## 2020-07-15 RX ORDER — ROPIVACAINE HYDROCHLORIDE 2 MG/ML
INJECTION, SOLUTION EPIDURAL; INFILTRATION; PERINEURAL
Status: COMPLETED
Start: 2020-07-15 | End: 2020-07-15

## 2020-07-15 RX ADMIN — SODIUM CHLORIDE, POTASSIUM CHLORIDE, SODIUM LACTATE AND CALCIUM CHLORIDE 1000 ML: 600; 310; 30; 20 INJECTION, SOLUTION INTRAVENOUS at 14:22

## 2020-07-15 RX ADMIN — SODIUM CHLORIDE, POTASSIUM CHLORIDE, SODIUM LACTATE AND CALCIUM CHLORIDE 1000 ML: 600; 310; 30; 20 INJECTION, SOLUTION INTRAVENOUS at 11:15

## 2020-07-15 RX ADMIN — CLINDAMYCIN IN 5 PERCENT DEXTROSE 900 MG: 18 INJECTION, SOLUTION INTRAVENOUS at 23:14

## 2020-07-15 RX ADMIN — ROPIVACAINE HYDROCHLORIDE: 2 INJECTION, SOLUTION EPIDURAL; INFILTRATION; PERINEURAL at 11:03

## 2020-07-15 RX ADMIN — SODIUM CHLORIDE, POTASSIUM CHLORIDE, SODIUM LACTATE AND CALCIUM CHLORIDE 1000 ML: 600; 310; 30; 20 INJECTION, SOLUTION INTRAVENOUS at 10:40

## 2020-07-15 RX ADMIN — MISOPROSTOL 800 MCG: 200 TABLET ORAL at 20:54

## 2020-07-15 RX ADMIN — SODIUM CHLORIDE, POTASSIUM CHLORIDE, SODIUM LACTATE AND CALCIUM CHLORIDE 1000 ML: 600; 310; 30; 20 INJECTION, SOLUTION INTRAVENOUS at 06:19

## 2020-07-15 RX ADMIN — CLINDAMYCIN IN 5 PERCENT DEXTROSE 900 MG: 18 INJECTION, SOLUTION INTRAVENOUS at 14:59

## 2020-07-15 RX ADMIN — OXYTOCIN 2 MILLI-UNITS/MIN: 10 INJECTION, SOLUTION INTRAMUSCULAR; INTRAVENOUS at 06:25

## 2020-07-15 RX ADMIN — BUPIVACAINE HYDROCHLORIDE 5 ML: 2.5 INJECTION, SOLUTION EPIDURAL; INFILTRATION; INTRACAUDAL; PERINEURAL at 11:00

## 2020-07-15 RX ADMIN — FENTANYL CITRATE 100 MCG: 50 INJECTION INTRAMUSCULAR; INTRAVENOUS at 21:55

## 2020-07-15 RX ADMIN — OXYTOCIN 125 ML/HR: 10 INJECTION, SOLUTION INTRAMUSCULAR; INTRAVENOUS at 19:36

## 2020-07-15 RX ADMIN — CLINDAMYCIN IN 5 PERCENT DEXTROSE 900 MG: 18 INJECTION, SOLUTION INTRAVENOUS at 08:21

## 2020-07-15 RX ADMIN — ROPIVACAINE HYDROCHLORIDE: 2 INJECTION, SOLUTION EPIDURAL; INFILTRATION at 11:03

## 2020-07-15 ASSESSMENT — LIFESTYLE VARIABLES: EVER_SMOKED: YES

## 2020-07-15 ASSESSMENT — PATIENT HEALTH QUESTIONNAIRE - PHQ9
2. FEELING DOWN, DEPRESSED, IRRITABLE, OR HOPELESS: NOT AT ALL
SUM OF ALL RESPONSES TO PHQ9 QUESTIONS 1 AND 2: 0
1. LITTLE INTEREST OR PLEASURE IN DOING THINGS: NOT AT ALL

## 2020-07-15 ASSESSMENT — PAIN SCALES - GENERAL: PAIN_LEVEL: 4

## 2020-07-15 ASSESSMENT — FIBROSIS 4 INDEX: FIB4 SCORE: 0.3

## 2020-07-15 NOTE — ANESTHESIA PREPROCEDURE EVALUATION
@ 40w1d, IUP, [place]  Morbid Obesity  Hx Meth Abuse  Asthma  Relevant Problems   NEURO   (+) History of suicidal tendencies      Other   (+) Anxiety   (+) Bipolar 2 disorder   (+) Borderline personality disorder (HCC)   (+) Class 3 severe obesity    (+) Tobacco dependence       Physical Exam    Airway   Mallampati: II  TM distance: >3 FB  Neck ROM: full       Cardiovascular - normal exam  Rhythm: regular  Rate: normal  (-) murmur     Dental - normal exam           Pulmonary - normal exam  Breath sounds clear to auscultation     Abdominal   (+) obese     Neurological - normal exam                 Anesthesia Plan    ASA 3   ASA physical status 3 criteria: morbid obesity - BMI greater than or equal to 40    Plan - epidural   Neuraxial block will be labor analgesia              Pertinent diagnostic labs and testing reviewed    Informed Consent:    Anesthetic plan and risks discussed with patient.

## 2020-07-15 NOTE — CARE PLAN
Problem: Safety  Goal: Will remain free from falls  Outcome: PROGRESSING AS EXPECTED  Note: Pt has remained free from falls     Problem: Infection  Goal: Will remain free from infection  Outcome: PROGRESSING AS EXPECTED  Note: Pt has remained free from s/sx of infection

## 2020-07-15 NOTE — PROGRESS NOTES
"0415- pt to LnD for schedule IOL for post dates. She states no VB, contractions, or LOF. She states +FM. Paper orders from Dr. Rodriguez. Pt states \"my mom told me I have been given amoxicillin and I was fine\"     SVE done, see flowsheets.   0655- Report given to LISETTE Clements RN. POC discussed. RN aware to call for hard copy of labs from [facility] and verify ABX with Dr. Rodriguez before administering it.  "

## 2020-07-15 NOTE — PROGRESS NOTES
0700 - Report from FILIPPO Torres RN. Pt resting in bed, EFM and toco in place. POC discussed, questions answered. Dr Rodriguez updated regarding exam (2/40/-4 per previous RN) and antibiotics. Orders received for clindamycin (see MAR)  0728 - RN at bedside to adjust EFM, difficult to monitor baby due to maternal body habitus  0820 - Multiple RNs at bedside to adjust EFM  1050 - Dr Herr at bedside to place epidural  1059 - test dose given, pt tolerated well  1115 - Pt positioned to left side. EFM and toco repositioned  1155 - LR bolus initiated. Pt rotated to right side. EFM and toco repositioned  1220 - Pitocin off. Coelho placed, pt tolerated well. SVE 3/50/-4. Pt rotated to left side. EFM and toco repositioned   1250 - Dr Rodriguez updated via phone regarding pt status and fetal tracing  1309 - Dr Rodriguez at bedside. Tracing reviewed by provider. SVE 3/70/-3. AROM, clear fluid. IUPC and FSE placed. Continue with POC. Pt positioned in semi-fowlers.   1505 - Pt positioned on left side  1515 - Pt positioned on right side.   1530 - Pt positioned in throne  1710 - Dr Rodriguez at bedside. MD reviewed tracing. SVE 4/70/-2. Continue with pitocin  1825 - Pt feeling pressure. Deep Variables. Dr Chacon called. SVE by provider 8-9/100/0.   1840 - SVE C/+1. Pushing with MD and RN at bedside.   1900 - Report to PAVITHRA Liu

## 2020-07-15 NOTE — PROGRESS NOTES
Progress Note    Subjective:   Doing well. Comfortable with the SARAH    Objective Data:  Recent Labs     07/15/20  0458   WBC 9.0   RBC 4.40   HEMOGLOBIN 12.6   HEMATOCRIT 38.1   MCV 86.6   MCH 28.6   MCHC 33.1*   RDW 42.2   PLATELETCT 156*   MPV 10.7           Vitals:    07/15/20 1151 07/15/20 1155 07/15/20 1156 07/15/20 1223   BP:    137/81   Pulse: 88 82 76 88   Resp:       Temp:       TempSrc:       SpO2: 96%  97%    Weight:       Height:         Abdomen: soft morbidly obese non tender fundus  SVE : 3cm/70%/-2 to -3  VTX S/P AROM Clear fluid  FSE and IUPC placed  Ext:non tender calves    FHTs: 140s with GBTBV now. S/P run of late decelerations now reassuring  [place]: q 2 min    No intake or output data in the 24 hours ending 07/15/20 1316    Current Facility-Administered Medications   Medication Dose Route Frequency Provider Last Rate Last Dose   • fentaNYL (SUBLIMAZE) injection 50 mcg  50 mcg Intravenous Q HOUR PRN Sancho Rodriguez M.D.       • fentaNYL (SUBLIMAZE) injection 100 mcg  100 mcg Intravenous Q HOUR PRN Sancho Rodriguez M.D.       • terbutaline (BRETHINE) injection 0.25 mg  0.25 mg Intravenous PRN Sancho Rodriguez M.D.       • hydrOXYzine HCl (ATARAX) tablet 50 mg  50 mg Oral Q6HRS PRN Sancho Rodriguez M.D.       • Na citrate-citric acid (BICITRA) 500-334 MG/5ML solution 30 mL  30 mL Oral Q6HRS PRN Sancho Rodriguez M.D.       • oxytocin (PITOCIN) infusion (for induction)  0.5-20 shaw-units/min Intravenous Continuous Sancho Rodriguez M.D. 24 mL/hr at 07/15/20 0822 8 shaw-units/min at 07/15/20 0822   • miSOPROStol (CYTOTEC) tablet 800 mcg  800 mcg Rectal Once PRN Sancho Rodriguez M.D.       • lactated ringers infusion  1,000 mL Intravenous Continuous Sancho Rodriguez M.D. 125 mL/hr at 07/15/20 1115 1,000 mL at 07/15/20 1115   • clindamycin (CLEOCIN) IVPB premix 900 mg  900 mg Intravenous Q8HRS Sancho Rodriguez M.D.   Stopped at 07/15/20 0921   • SOD CITRATE-CITRIC ACID 500-334 MG/5ML PO SOLN             • ropivacaine (NAROPIN) injection   Epidural Continuous Star Herr D.O.       • ePHEDrine injection 5 mg  5 mg Intravenous Q5 MIN PRN Star Herr D.O.        And   • lactated ringers infusion (BOLUS)  250 mL Intravenous PRN Star Herr D.O.         Facility-Administered Medications Ordered in Other Encounters   Medication Dose Route Frequency Provider Last Rate Last Dose   • bupivacaine 0.25% (SENSORCAINE-MARCAINE) pf injection    PRN CAT LopezO.   5 mL at 07/15/20 1100       A/P 20 yo  at term with reassuring fetal status on Pitocin augmentation. Plan to proceed with the present management and will monitor fetal status closely

## 2020-07-15 NOTE — ANESTHESIA PROCEDURE NOTES
Epidural Block    Date/Time: 7/15/2020 10:54 AM  Performed by: Star Herr D.O.  Authorized by: Star Herr D.O.     Patient Location:  OB  Start Time:  7/15/2020 10:54 AM  End Time:  7/15/2020 11:00 AM  Reason for Block: labor analgesia    patient identified, IV checked, site marked, risks and benefits discussed, surgical consent, monitors and equipment checked, pre-op evaluation and timeout performed    Patient Position:  Sitting  Prep: ChloraPrep, patient draped and sterile technique    Monitoring:  Blood pressure, continuous pulse oximetry and heart rate  Approach:  Midline  Location:  L2-L3  Injection Technique:  DONNA air  Skin infiltration:  Lidocaine  Strength:  1%  Dose:  3ml  Needle Type:  Tuohy  Needle Gauge:  17 G  Needle Length:  3.5 in  Loss of resistance::  8  Catheter Size:  19 G  Catheter at Skin Depth:  14  Test Dose Result:  Negative

## 2020-07-16 LAB
AMPHET UR QL SCN: NEGATIVE
BARBITURATES UR QL SCN: NEGATIVE
BENZODIAZ UR QL SCN: NEGATIVE
BZE UR QL SCN: NEGATIVE
CANNABINOIDS UR QL SCN: NEGATIVE
ERYTHROCYTE [DISTWIDTH] IN BLOOD BY AUTOMATED COUNT: 42.3 FL (ref 35.9–50)
HCT VFR BLD AUTO: 33.9 % (ref 37–47)
HGB BLD-MCNC: 11 G/DL (ref 12–16)
MCH RBC QN AUTO: 28.1 PG (ref 27–33)
MCHC RBC AUTO-ENTMCNC: 32.4 G/DL (ref 33.6–35)
MCV RBC AUTO: 86.7 FL (ref 81.4–97.8)
METHADONE UR QL SCN: NEGATIVE
OPIATES UR QL SCN: NEGATIVE
OXYCODONE UR QL SCN: NEGATIVE
PCP UR QL SCN: NEGATIVE
PLATELET # BLD AUTO: 137 K/UL (ref 164–446)
PMV BLD AUTO: 11.2 FL (ref 9–12.9)
PROPOXYPH UR QL SCN: NEGATIVE
RBC # BLD AUTO: 3.91 M/UL (ref 4.2–5.4)
WBC # BLD AUTO: 9.4 K/UL (ref 4.8–10.8)

## 2020-07-16 PROCEDURE — 85027 COMPLETE CBC AUTOMATED: CPT

## 2020-07-16 PROCEDURE — A9270 NON-COVERED ITEM OR SERVICE: HCPCS | Performed by: SPECIALIST

## 2020-07-16 PROCEDURE — 36415 COLL VENOUS BLD VENIPUNCTURE: CPT

## 2020-07-16 PROCEDURE — 303615 HCHG EPIDURAL/SPINAL ANESTHESIA FOR LABOR

## 2020-07-16 PROCEDURE — 80307 DRUG TEST PRSMV CHEM ANLYZR: CPT

## 2020-07-16 PROCEDURE — 700102 HCHG RX REV CODE 250 W/ 637 OVERRIDE(OP): Performed by: SPECIALIST

## 2020-07-16 PROCEDURE — 770002 HCHG ROOM/CARE - OB PRIVATE (112)

## 2020-07-16 RX ORDER — SODIUM CHLORIDE, SODIUM LACTATE, POTASSIUM CHLORIDE, CALCIUM CHLORIDE 600; 310; 30; 20 MG/100ML; MG/100ML; MG/100ML; MG/100ML
INJECTION, SOLUTION INTRAVENOUS PRN
Status: DISCONTINUED | OUTPATIENT
Start: 2020-07-16 | End: 2020-07-17 | Stop reason: HOSPADM

## 2020-07-16 RX ADMIN — IBUPROFEN 600 MG: 600 TABLET ORAL at 20:22

## 2020-07-16 RX ADMIN — IBUPROFEN 600 MG: 600 TABLET ORAL at 13:43

## 2020-07-16 RX ADMIN — IBUPROFEN 600 MG: 600 TABLET ORAL at 06:32

## 2020-07-16 ASSESSMENT — EDINBURGH POSTNATAL DEPRESSION SCALE (EPDS)
I HAVE BLAMED MYSELF UNNECESSARILY WHEN THINGS WENT WRONG: NOT VERY OFTEN
I HAVE BEEN ANXIOUS OR WORRIED FOR NO GOOD REASON: YES, SOMETIMES
I HAVE FELT SAD OR MISERABLE: NO, NOT AT ALL
I HAVE LOOKED FORWARD WITH ENJOYMENT TO THINGS: AS MUCH AS I EVER DID
THINGS HAVE BEEN GETTING ON TOP OF ME: NO, MOST OF THE TIME I HAVE COPED QUITE WELL
I HAVE BEEN SO UNHAPPY THAT I HAVE BEEN CRYING: NO, NEVER
THE THOUGHT OF HARMING MYSELF HAS OCCURRED TO ME: NEVER
I HAVE BEEN SO UNHAPPY THAT I HAVE HAD DIFFICULTY SLEEPING: NOT AT ALL
I HAVE FELT SCARED OR PANICKY FOR NO GOOD REASON: YES, SOMETIMES
I HAVE BEEN ABLE TO LAUGH AND SEE THE FUNNY SIDE OF THINGS: AS MUCH AS I ALWAYS COULD

## 2020-07-16 NOTE — CARE PLAN
Problem: Potential for postpartum infection related to presence of episiotomy/vaginal tear and/or uterine contamination  Goal: Patient will be absent from signs and symptoms of infection  Outcome: PROGRESSING AS EXPECTED  Note: Patient vitals stable.temp wnl. Patient has no fever or chills. Patient instructed in pericare with otis bottle and q3-4 hour pad changes. Cbc shows no signs of infection.      Problem: Potential anxiety related to difficulty adapting to parental role  Goal: Patient will verbalize and demonstrate effective bonding and parenting behavior  Outcome: PROGRESSING AS EXPECTED  Note: Patient bonding well with baby.holding and comforting baby when needed.

## 2020-07-16 NOTE — DISCHARGE PLANNING
Discharge Planning Assessment Post Partum    Reason for Referral: Hx of meth use, Open CPS case with other child  Address: [address] [place] [address] [address], [place] [address]  Type of Living Situation: Apartment with FOB.   Mom Diagnosis: Pregnancy   Baby Diagnosis:    Primary Language: English     Name of Baby: Prince Rip Abel   Father of the Baby: Dontae Abel   Involved in baby’s care? 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 MOB stated that they are on track to get her back in August.    Financial Hardship/Income: No  Mom's Mental status: Alert and Oriented x 4  Services used prior to admit: WIC, Medicaid.     CPS History: Yes, MOB/FOB have an open case with Amna Calzada with [facility].   Psychiatric History: Yes, MOB/FOB both go to counseling through [facility]. MOB works with [employer] and FOB works with [facility]. MOB has a history of depression, borderline personality and bipolar. MOB stated she is doing well right now.   Domestic Violence History: No  Drug/ETOH History: Yes, Hx of meth use. NICOLASA stated she used meth up until 2019. MOB relapsed in 2020 but has been clean since. UDS was negative.     LSW made a report with Param Valentin with [facility].     Resources Provided: MTM information   Referrals Made: Diaper Referral      Clearance for Discharge: Baby is not clear to discharge home with MOB/FOB at this time.     Ongoing Plan: Awaiting clearance for discharge from CPS.

## 2020-07-16 NOTE — L&D DELIVERY NOTE
DATE OF SERVICE:  07/15/2020    DELIVERY NOTE    Briefly, this is a 19-year-old  4, para 1, spontaneous  2, at   40-1/7th weeks' gestation who did present with a desire to proceed forward   with a post-term augmentation of labor with Pitocin per protocol.  The patient   initially had a nonreactive tracing and after a period of observation did   have a reactive tracing and thus Pitocin was started, increased per protocol.    The patient also was started on antibiotics given her group B strep positive   status with clindamycin which she was given 2 doses prior to delivery.  She   had increasing amounts of Pitocin per protocol; however, began having   repetitive late decelerations, the Pitocin was turned off.  Once reassuring   fetal status was noted, the patient had also had a continuous lumbar epidural   to optimize pain management.  Pitocin was started once again.  The patient did   have an artificial rupture of membranes performed at approximately 3 cm.  An   intrauterine pressure catheter and fetal scalp electrode were placed.    Approximately 4 hours later, she was examined, had only made 1 cm of cervical   change.  She was having some mild-to-moderate variable decelerations as well   as intermittent late decelerations; however, reassuring tracing between the   decelerations.  Thus the Pitocin was increased per protocol and over 1 hour   she went from 4 cm to an anterior lip.  She continued to have   moderate-to-severe variable decelerations with intermittent late decelerations   slow return to the baseline.  However, the patient was progressing well,   subsequently underwent a spontaneous vaginal delivery with a nuchal cord   reduced on the perineum.  Alphonso maneuver in order to deliver the   shoulders.  Shoulders and body were then delivered easily.  Cord was clamped   and cut.  There was terminal meconium noted.  The infant was handed to the   awaiting nursing and respiratory staff.  Apgars  were assigned at 5 and 9 at 1   and 5 minutes respectively.  Cord gases were saved and sent and are currently   pending.  The estimated blood loss for the delivery was 100 mL.  There were no   lacerations for repair.  The placenta was delivered spontaneous and intact   with 3-vessel cord.  Patient tolerated labor and delivery well.             ____________________________________     MD JORDAN JUAREZ / MAYO    DD:  07/15/2020 19:22:37  DT:  07/16/2020 02:28:04    D#:  [id number]  Job#:  [id number]

## 2020-07-16 NOTE — CONSULTS
Mom states that baby has had several breastfeeding sessions of 15 minutes per breast. Baby being bathed at present so unable to observe a latch at this time.    This is mom's second baby but first breastfeeding experience.  Mom denies any discomfort during feedings and states she has been leaking colostrum during pregnancy.    Mom will offer the breast every 3-4 hours and whenever Hood is looking to suckle. Mom was asked to call for Lactation at next feeding for LATCH assessment.

## 2020-07-16 NOTE — ANESTHESIA QCDR
2019 [facility] Clinical Data Registry (for Quality Improvement)     Postoperative nausea/vomiting risk protocol (Adult = 18 yrs and Pediatric 3-17 yrs)- (430 and 463)  General inhalation anesthetic (NOT TIVA) with PONV risk factors: No  Provision of anti-emetic therapy with at least 2 different classes of agents: N/A  Patient DID NOT receive anti-emetic therapy and reason is documented in Medical Record: N/A    Multimodal Pain Management- (477)  Non-emergent surgery AND patient age >= 18: No  Use of Multimodal Pain Management, two or more drugs and/or interventions, NOT including systemic opioids:   Exception: Documented allergy to multiple classes of analgesics:     Smoking Abstinence (404)  Patient is current smoker (cigarette, pipe, e-cig, marijuanna): No  Elective Surgery:   Abstinence instructions provided prior to day of surgery:   Patient abstained from smoking on day of surgery:     Pre-Op Beta-Blocker in Isolated CABG (44)  Isolated CABG AND patient age >= 18: No  Beta-blocker admin within 24 hours of surgical incision:   Exception:of medical reason(s) for not administering beta blocker within 24 hours prior to surgical incision (e.g., not  indicated,other medical reason):     PACU assessment of acute postoperative pain prior to Anesthesia Care End- Applies to Patients Age = 18- (ABG7)  Initial PACU pain score is which of the following: < 7/10  Patient unable to report pain score: N/A    Post-anesthetic transfer of care checklist/protocol to PACU/ICU- (426 and 427)  Upon conclusion of case, patient transferred to which of the following locations: PACU/Non-ICU  Use of transfer checklist/protocol: Yes  Exclusion: Service Performed in Patient Hospital Room (and thus did not require transfer): N/A  Unplanned admission to ICU related to anesthesia service up through end of PACU care- (MD51)  Unplanned admission to ICU (not initially anticipated at anesthesia start time): No

## 2020-07-16 NOTE — PROGRESS NOTES
Progress Note    Subjective:   Comfortable with SARAH.     Objective Data:  Recent Labs     07/15/20  0458   WBC 9.0   RBC 4.40   HEMOGLOBIN 12.6   HEMATOCRIT 38.1   MCV 86.6   MCH 28.6   MCHC 33.1*   RDW 42.2   PLATELETCT 156*   MPV 10.7           Vitals:    07/15/20 1441 07/15/20 1502 07/15/20 1522 07/15/20 1542   BP: 102/53 108/56 (!) 93/47 103/56   Pulse: 68 86 96 96   Resp:       Temp:       TempSrc:       SpO2:       Weight:       Height:         Abdomen: soft gravid non tender  SVE: 4/80%/-2 Vtx IUPC and FSE in place  Ext: non tender calves    FHTs: 140s with intermitttent late decelerations and mild variable decelerations  [place]: q 2 - 4 min     No intake or output data in the 24 hours ending 07/15/20 1724    Current Facility-Administered Medications   Medication Dose Route Frequency Provider Last Rate Last Dose   • fentaNYL (SUBLIMAZE) injection 50 mcg  50 mcg Intravenous Q HOUR PRN Sancho Rodriguez M.D.       • fentaNYL (SUBLIMAZE) injection 100 mcg  100 mcg Intravenous Q HOUR PRN Sancho Rodriguez M.D.       • terbutaline (BRETHINE) injection 0.25 mg  0.25 mg Intravenous PRN Sancho Rodriguez M.D.       • hydrOXYzine HCl (ATARAX) tablet 50 mg  50 mg Oral Q6HRS PRN Sancho Rodriguez M.D.       • Na citrate-citric acid (BICITRA) 500-334 MG/5ML solution 30 mL  30 mL Oral Q6HRS PRN Sancho Rodriguez M.D.       • oxytocin (PITOCIN) infusion (for induction)  0.5-20 shaw-units/min Intravenous Continuous Sancho Rodriguez M.D. 12 mL/hr at 07/15/20 1550 4 shaw-units/min at 07/15/20 1550   • miSOPROStol (CYTOTEC) tablet 800 mcg  800 mcg Rectal Once PRN Sancho Rodriguez M.D.       • lactated ringers infusion  1,000 mL Intravenous Continuous Sancho Rodriguez M.D. 125 mL/hr at 07/15/20 1422 1,000 mL at 07/15/20 1422   • clindamycin (CLEOCIN) IVPB premix 900 mg  900 mg Intravenous Q8HRS Sancho Rodriguez M.D. 50 mL/hr at 07/15/20 1459 900 mg at 07/15/20 1459   • SOD CITRATE-CITRIC ACID 500-334 MG/5ML PO SOLN            •  ropivacaine (NAROPIN) injection   Epidural Continuous Star Herr D.O.       • ePHEDrine injection 5 mg  5 mg Intravenous Q5 MIN PRN Star Herr D.O.        And   • lactated ringers infusion (BOLUS)  250 mL Intravenous PRN Star Herr D.O.         Facility-Administered Medications Ordered in Other Encounters   Medication Dose Route Frequency Provider Last Rate Last Dose   • bupivacaine 0.25% (SENSORCAINE-MARCAINE) pf injection    PRN Star Herr D.O.   5 mL at 07/15/20 1100       A/P 20 yo  at 40 1/7 weeks gestation with intermittent late decelerations and consistent variable decelerations now with irregular uterine contractions. The Pitocin has been shut off already secondary to repetitive late decelerations and now with increasing Pitocin once again only at 4 MIU the patient has late decelerations that are still intermittent. We did discuss the possibility of the need to consider a C/S if repetitive late decelerations noted once again with the lack of any sig cervical change as she has made only 1cm progress in 6 hours. Patient has been counseled regarding this possibility and will reassess in one hour or earlier if necessary.

## 2020-07-16 NOTE — ANESTHESIA POSTPROCEDURE EVALUATION
Patient: Mitzi Abel    Procedure Summary     Date:  07/15/20 Room / Location:      Anesthesia Start:  1048 Anesthesia Stop:  1906    Procedure:  Labor Epidural Diagnosis:      Scheduled Providers:   Responsible Provider:  Star Herr D.O.    Anesthesia Type:  epidural ASA Status:  3          Final Anesthesia Type: epidural  Last vitals  BP   Blood Pressure: 104/52    Temp   36.8 °C (98.3 °F)    Pulse   Pulse: (!) 103   Resp   16    SpO2   97 %      Anesthesia Post Evaluation    Patient location during evaluation: floor  Patient participation: complete - patient participated  Level of consciousness: awake and alert  Pain score: 4    Airway patency: patent  Anesthetic complications: no  Cardiovascular status: hemodynamically stable  Respiratory status: acceptable  Hydration status: euvolemic    PONV: none           Nurse Pain Score: 4 (NPRS)

## 2020-07-16 NOTE — OR SURGEON
Immediate Delivery Note        Estimated Blood Loss: 100ccs    Findings:  over an intact perineum with nuchal cord reduced on the perineum with Alphonso to delivery the head with easy delivery of the shoulders and body with terminal meconium noted with Apgars of 5/9 at one and five minutes respectively. No lacerations for repair. Placenta delivered spontaneous and intact with 3vc. Cord gases are currently pending.    Complications: None        7/15/2020 7:16 PM Sancho Rodriguez M.D.

## 2020-07-16 NOTE — PROGRESS NOTES
Progress Note    Subjective:   Doing well without any complaints. No sig bleeding or discharge this am. Pain well controlled.     Objective Data:  Recent Labs     07/15/20  0458 07/16/20  0323   WBC 9.0 9.4   RBC 4.40 3.91*   HEMOGLOBIN 12.6 11.0*   HEMATOCRIT 38.1 33.9*   MCV 86.6 86.7   MCH 28.6 28.1   MCHC 33.1* 32.4*   RDW 42.2 42.3   PLATELETCT 156* 137*   MPV 10.7 11.2           Vitals:    07/15/20 2345 07/16/20 0015 07/16/20 0400 07/16/20 0600   BP: 109/56 131/84 (!) 98/57 134/71   Pulse: 97 86 85 73   Resp:  18 16 18   Temp:  37.1 °C (98.8 °F) 36 °C (96.8 °F) 36.6 °C (97.9 °F)   TempSrc:  Temporal Temporal Temporal   SpO2:       Weight:       Height:         ABdomen: soft obese non tender   Perineum: no sig bleeding or discharge  Ext: non tender calves    Intake/Output Summary (Last 24 hours) at 7/16/2020 0808  Last data filed at 7/15/2020 2159  Gross per 24 hour   Intake --   Output 1106 ml   Net -1106 ml       Current Facility-Administered Medications   Medication Dose Route Frequency Provider Last Rate Last Dose   • LR infusion   Intravenous PRN Sancho Rodriguez M.D.       • tetanus-dipth-acell pertussis (Tdap) inj 0.5 mL  0.5 mL Intramuscular Once PRN Sancho Rodriguez M.D.       • measles, mumps and rubella vaccine (MMR) injection 0.5 mL  0.5 mL Subcutaneous Once PRN Sancho Rodriguez M.D.       • ondansetron (ZOFRAN ODT) dispertab 4 mg  4 mg Oral Q6HRS PRN Sancho Rodriguez M.D.        Or   • ondansetron (ZOFRAN) syringe/vial injection 4 mg  4 mg Intravenous Q6HRS PRN Sancho Rodriguez M.D.       • oxytocin (PITOCIN) infusion (for postpartum)  2,000 mL/hr Intravenous Once Sancho Rodriguez M.D. 2,000 mL/hr at 07/15/20 1910 2,000 mL/hr at 07/15/20 1910    Followed by   • oxytocin (PITOCIN) infusion (for postpartum)   mL/hr Intravenous Continuous Sancho Rodriguez M.D. 999 mL/hr at 07/15/20 2125 999 mL/hr at 07/15/20 2125   • ibuprofen (MOTRIN) tablet 600 mg  600 mg Oral Q6HRS PRN Sancho Rodriguez M.D.    600 mg at 20 0632   • HYDROcodone-acetaminophen (NORCO) 5-325 MG per tablet 1 Tab  1 Tab Oral Q4HRS PRN Sancho Rodriguez M.D.       • HYDROcodone-acetaminophen (NORCO) 5-325 MG per tablet 2 Tab  2 Tab Oral Q4HRS PRN Sancho Rodriguez M.D.           A/P S/P . Doing well on PPD #1. Plan to proceed with the usual pp management. All questions or concerns. Continue with routine pp management.

## 2020-07-16 NOTE — PROGRESS NOTES
Progress Note    Subjective:   No sig complaints of pelvic pain or pressure.    Objective Data:  Recent Labs     07/15/20  0458   WBC 9.0   RBC 4.40   HEMOGLOBIN 12.6   HEMATOCRIT 38.1   MCV 86.6   MCH 28.6   MCHC 33.1*   RDW 42.2   PLATELETCT 156*   MPV 10.7           Vitals:    07/15/20 2041 07/15/20 2101 07/15/20 2121 07/15/20 2141   BP: 107/62 132/53 128/66 121/58   Pulse: (!) 114 (!) 103 (!) 110 96   Resp:       Temp:    36.4 °C (97.5 °F)   TempSrc:    Temporal   SpO2:       Weight:       Height:         Abdomen: soft morbidly obese and non tender fundus which is difficult to palpate given the increased BMI  Perineum: evacuated 236cc of clot on BME and evacuation of clot from the uterus with no active bleeding after evacuation  Ext:non tender calves    Intake/Output Summary (Last 24 hours) at 7/15/2020 2202  Last data filed at 7/15/2020 2159  Gross per 24 hour   Intake --   Output 1106 ml   Net -1106 ml       Current Facility-Administered Medications   Medication Dose Route Frequency Provider Last Rate Last Dose   • fentaNYL (SUBLIMAZE) injection 50 mcg  50 mcg Intravenous Q HOUR PRN Sancho Rodriguez M.D.       • fentaNYL (SUBLIMAZE) injection 100 mcg  100 mcg Intravenous Q HOUR PRRUT Rodriguez M.D.   100 mcg at 07/15/20 2155   • terbutaline (BRETHINE) injection 0.25 mg  0.25 mg Intravenous PRN Sancho Rodriguez M.D.       • hydrOXYzine HCl (ATARAX) tablet 50 mg  50 mg Oral Q6HRS PRN Sancho Rodriguez M.D.       • Na citrate-citric acid (BICITRA) 500-334 MG/5ML solution 30 mL  30 mL Oral Q6HRS PRN Sancho Rodriguez M.D.       • ondansetron (ZOFRAN ODT) dispertab 4 mg  4 mg Oral Q6HRS PRN Sancho Rodriguez M.D.        Or   • ondansetron (ZOFRAN) syringe/vial injection 4 mg  4 mg Intravenous Q6HRS PRN Sancho Rodriguez M.D.       • oxytocin (PITOCIN) infusion (for induction)  0.5-20 shaw-units/min Intravenous Continuous Sancho Rodriguez M.D. 18 mL/hr at 07/15/20 1710 6 shaw-units/min at 07/15/20 1710   •  oxytocin (PITOCIN) infusion (for postpartum)  2,000 mL/hr Intravenous Once Sancho Rodriguez M.D. 2,000 mL/hr at 07/15/20 1910 2,000 mL/hr at 07/15/20 1910    Followed by   • oxytocin (PITOCIN) infusion (for postpartum)   mL/hr Intravenous Continuous Sancho Rodriguez M.D. 125 mL/hr at 07/15/20 193 125 mL/hr at 07/15/20 193   • ibuprofen (MOTRIN) tablet 600 mg  600 mg Oral Q6HRS PRN Sancho Rodriguez M.D.       • HYDROcodone-acetaminophen (NORCO) 5-325 MG per tablet 1 Tab  1 Tab Oral Q4HRS PRN Sancho Rodriguez M.D.       • HYDROcodone-acetaminophen (NORCO) 5-325 MG per tablet 2 Tab  2 Tab Oral Q4HRS PRN Sancho Rodriguez M.D.       • lactated ringers infusion  1,000 mL Intravenous Continuous Sancho Rodriguez M.D. 125 mL/hr at 07/15/20 1422 1,000 mL at 07/15/20 1422   • clindamycin (CLEOCIN) IVPB premix 900 mg  900 mg Intravenous Q8HRS Sancho Rodriguez M.D.   Stopped at 07/15/20 1559   • SOD CITRATE-CITRIC ACID 500-334 MG/5ML PO SOLN            • ropivacaine (NAROPIN) injection   Epidural Continuous RAF Lopez.O.       • ePHEDrine injection 5 mg  5 mg Intravenous Q5 MIN PRN RAF Lopez.O.        And   • lactated ringers infusion (BOLUS)  250 mL Intravenous PRN RAF Lopez.O.           A/P S/P  and now PPH with approximate 800ccs of blood loss after delivery. She was given Pitocin and Cytotec but continued with oozing and probably secondary to inability to properly do fundal massage given the increased BMI and thus was given Fentanyl so that a manual attempt at evacuation of clot was done from the vaginal vault and lower uterine segment. Will continue with the increased Pitocin and need for continued attempts to give fundal uterine massage.

## 2020-07-16 NOTE — PROGRESS NOTES
Assessment done. Pt.states pain at 3 and denies need for medication. Fundus firm.Lochia light. Fob at bedside,asleep. . Breastfeeding assessment done.plan of care for today discussed.

## 2020-07-16 NOTE — PROGRESS NOTES
: Report received from Yovani ARSHAD. Dr Rodriguez at bedside. Pt pushing.     : RT called to delivery room.  called into room for possible vacuum delivery.     :  of viable male infant. Alphonso and suprapubic pressure to delivery. Mild shoulder dystocia x30 seconds. Terminal meconium. Apgars 5,9. Cord gases collected and sent.     : Spontaneous delivery of intact placenta.     : Per social work recommendation, pt should have urine drug screen for hx of drug use earlier in pregnancy. Call made to Dr Rodriguez with recommendation, order received for urine drug screen.     : Moderate bleeding noted. Cytotec placed per order. Dr Chacon called with update.     : RN continuously at bedside for moderate bleeding with clots. S.Threats charge RN called to bedside to assess. Pitocin bolusing. Blod/clots weighed, 670g. Bladder emptied. Call made to Dr Rodriguez to come to bedside for bleeding. MD to come.     : Dr Rodriguez at bedside. Bimanual performed. Pads weighed, 236 ml blood/clots.     : Order from Dr Rodriguez to give one time additional dose of clindamycin. MD updated on pt status.     0000: Pt up to bathroom, steady gait. Able to void. Yu care done.     0010: Pt up to postpartum. Report to Patrica ARSHAD. POC discussed.

## 2020-07-16 NOTE — ANESTHESIA TIME REPORT
Anesthesia Start and Stop Event Times     Date Time Event    7/15/2020 1042 Ready for Procedure     1048 Anesthesia Start     1906 Anesthesia Stop        Responsible Staff  07/15/20    Name Role Begin End    Star Herr D.O. Anesth 1048 1906        Preop Diagnosis (Free Text):  Pre-op Diagnosis             Preop Diagnosis (Codes):    Post op Diagnosis  Normal delivery      Premium Reason  A. 3PM - 7AM    Comments:

## 2020-07-17 VITALS
TEMPERATURE: 97.8 F | WEIGHT: 293 LBS | DIASTOLIC BLOOD PRESSURE: 85 MMHG | BODY MASS INDEX: 50.02 KG/M2 | HEART RATE: 97 BPM | HEIGHT: 64 IN | OXYGEN SATURATION: 95 % | SYSTOLIC BLOOD PRESSURE: 134 MMHG | RESPIRATION RATE: 17 BRPM

## 2020-07-17 RX ORDER — IBUPROFEN 600 MG/1
600 TABLET ORAL EVERY 6 HOURS PRN
Qty: 30 TAB | Refills: 0 | Status: SHIPPED | OUTPATIENT
Start: 2020-07-17 | End: 2021-08-21

## 2020-07-17 NOTE — LACTATION NOTE
Was able to assist mom with positioning and latch this am.  Breasts very wide spaced, asymetrical, and tubular.    Mom states she was diagnosed with PCOS when she was 9. Mom also has a medical history of insulin resistance.     Mom's first baby had a weight loss of over 10% and she needed to supplement early on. She pumped for 3 weeks with a manual pump but never produced well.  Mom has a double electric pump but is unsure of what kind.    Baby has had one meconium since birth, weight loss as of last pm was 3.7%.  Will get weight check prior to discharge. Urine diapers are adequate.    Mom urged to call WIC this am and [facility] to arrange f/u for early next week. Supplementation guidelines give if baby becomes fussy and diaper output diminishes. Mom has log in information for Zoom meeting.    See infant's chart for Assessment and LATCH score.

## 2020-07-17 NOTE — PROGRESS NOTES
Assessment complete. Fundus firm, lochia light. Pain 1/10, medicated per MAR. Discussed POC for the night. All questions answered at this time. Call light within reach. Encouraged patient to call with any further questions or concerns.

## 2020-07-17 NOTE — CARE PLAN
Problem: Alteration in comfort related to episiotomy, vaginal repair and/or after birth pains  Goal: Patient is able to ambulate, care for self and infant  Outcome: PROGRESSING AS EXPECTED  Note: Patient ambulates comfortably and cares for self and infant     Problem: Potential knowledge deficit related to lack of understanding of self and  care  Goal: Patient will demonstrate ability to care for self and infant  Outcome: PROGRESSING AS EXPECTED  Note: Patient able to care for self and infant appropraitely

## 2020-07-17 NOTE — DISCHARGE INSTRUCTIONS
POSTPARTUM DISCHARGE INSTRUCTIONS FOR MOM    YOB: 2000   Age: 19 y.o.               Admit Date: 7/15/2020     Discharge Date: 2020  Attending Doctor:  Sancho Rodriguez M.D.                  Allergies:  Penicillins    Discharged to home by car. Discharged via wheelchair, hospital escort: Yes.  Special equipment needed: Not Applicable  Belongings with: Personal  Be sure to schedule a follow-up appointment with your primary care doctor or any specialists as instructed.     Discharge Plan:   Diet Plan: Discussed  Activity Level: Discussed  Smoking Cessation Offered: Patient Refused  Confirmed Follow up Appointment: Patient to Call and Schedule Appointment  Confirmed Symptoms Management: Discussed  Medication Reconciliation Updated: Yes    REASONS TO CALL YOUR OBSTETRICIAN:  1.   Persistent fever or shaking chills (Temperature higher than 100.4)  2.   Heavy bleeding (soaking more than 1 pad per hour); Passing clots  3.   Foul odor from vagina  4.   Mastitis (Breast infection; breast pain, chills, fever, redness)  5.   Urinary pain, burning or frequency  6.   Episiotomy infection  7.   Abdominal incision infection  8.   Severe depression longer than 24 hours    HAND WASHING  · Prior to handling the baby.  · Before breastfeeding or bottle feeding baby.  · After using the bathroom or changing the baby's diaper.    WOUND CARE  Ask your physician for additional care instructions.  In general:    ·  Incision:      · Keep clean and dry.    · Do NOT lift anything heavier than your baby for up to 6 weeks.    · There should not be any opening or pus.      VAGINAL CARE  · Nothing inside vagina for 6 weeks: no sexual intercourse, tampons or douching.  · Bleeding may continue for 2-4 weeks.  Amount may vary.    · Call your physician for heavy bleeding which means soaking more than 1 pad per hour    BIRTH CONTROL  · It is possible to become pregnant at any time after delivery and while breastfeeding.  · Plan  "to discuss a method of birth control with your physician at your follow up visit. visit.    DIET AND ELIMINATION  · Eating more fiber (bran cereal, fruits, and vegetables) and drinking plenty of fluids will help to avoid constipation.  · Urinary frequency after childbirth is normal.    POSTPARTUM BLUES  During the first few days after birth, you may experience a sense of the \"blues\" which may include impatience, irritability or even crying.  These feeling come and go quickly.  However, as many as 1 in 10 women experience emotional symptoms known as postpartum depression.    Postpartum depression:  May start as early as the second or third day after delivery or take several weeks or months to develop.  Symptoms of \"blues\" are present, but are more intense:  Crying spells; loss of appetite; feelings of hopelessness or loss of control; fear of touching the baby; over concern or no concern at all about the baby; little or no concern about your own appearance/caring for yourself; and/or inability to sleep or excessive sleeping.  Contact your physician if you are experiencing any of these symptoms.    Crisis Hotline:  · [place] Crisis Hotline:  8-692-XNMCDCO  Or 1-314.678.9748  · [place] Crisis Hotline:  1-476.641.9056  Or 067-096-7644    PREVENTING SHAKEN BABY:  If you are angry or stressed, PUT THE BABY IN THE CRIB, step away, take some deep breaths, and wait until you are calm to care for the baby.  DO NOT SHAKE THE BABY.  You are not alone, call a supporter for help.    · Crisis Call Center 24/7 crisis line 945-558-2965 or 1-371.760.4015  · You can also text them, text \"ANSWER\" to 506552    QUIT SMOKING/TOBACCO USE:  I understand the use of any tobacco products increases my chance of suffering from future heart disease and could cause other illnesses which may shorten my life. Quitting the use of tobacco products is the single most important thing I can do to improve my health. For further information on smoking / " tobacco cessation call a Toll Free Quit Line at 1-647.791.9884 (*[employer] [place]) or 1-216.977.8860 (American Lung Association) or you can access the web based program at www.lungusa.org.    · [place] Tobacco Users Help Line:  (933) 986-6305       Toll Free: 1-880.848.3570  · Quit Tobacco Program [facility] (865)684-0837    DEPRESSION / SUICIDE RISK:  As you are discharged from this [facility], it is important to learn how to keep safe from harming yourself.    Recognize the warning signs:  · Abrupt changes in personality, positive or negative- including increase in energy   · Giving away possessions  · Change in eating patterns- significant weight changes-  positive or negative  · Change in sleeping patterns- unable to sleep or sleeping all the time   · Unwillingness or inability to communicate  · Depression  · Unusual sadness, discouragement and loneliness  · Talk of wanting to die  · Neglect of personal appearance   · Rebelliousness- reckless behavior  · Withdrawal from people/activities they love  · Confusion- inability to concentrate     If you or a loved one observes any of these behaviors or has concerns about self-harm, here's what you can do:  · Talk about it- your feelings and reasons for harming yourself  · Remove any means that you might use to hurt yourself (examples: pills, rope, extension cords, firearm)  · Get professional help from the community (Mental Health, Substance Abuse, psychological counseling)  · Do not be alone:Call your Safe Contact- someone whom you trust who will be there for you.  · Call your local CRISIS HOTLINE 296-4912 or 601-085-2990  · Call your local Children's Mobile Crisis Response Team [place] (572) 995-8372 or www.[employer]  · Call the toll free National Suicide Prevention Hotlines   · National Suicide Prevention Lifeline 587-246-TLGP (7360)  · National Hope Line Network 800-SUICIDE (223-5821)    DISCHARGE  SURVEY:  [facility].  We hope we provided you with very good care.  You may be receiving a survey in the mail.  Please fill it out.  Your opinion is valuable to us.    ADDITIONAL EDUCATIONAL MATERIALS GIVEN TO PATIENT:        My signature on this form indicates that:  1.  I have reviewed and understand the above information  2.  My questions regarding this information have been answered to my satisfaction.  3.  I have formulated a plan with my discharge nurse to obtain my prescribed medication for home.

## 2020-07-17 NOTE — PROGRESS NOTES
Progress Note    Subjective:   Doing well. No issues or concerns. Pain well controlled. No isg bleeding was appreciated. BF well.     Objective Data:  Recent Labs     07/15/20  0458 07/16/20  0323   WBC 9.0 9.4   RBC 4.40 3.91*   HEMOGLOBIN 12.6 11.0*   HEMATOCRIT 38.1 33.9*   MCV 86.6 86.7   MCH 28.6 28.1   MCHC 33.1* 32.4*   RDW 42.2 42.3   PLATELETCT 156* 137*   MPV 10.7 11.2           Vitals:    07/16/20 0400 07/16/20 0600 07/16/20 1800 07/17/20 0600   BP: (!) 98/57 134/71 136/87 134/85   Pulse: 85 73 83 97   Resp: 16 18 17 17   Temp: 36 °C (96.8 °F) 36.6 °C (97.9 °F) 35.9 °C (96.7 °F) 36.6 °C (97.8 °F)   TempSrc: Temporal Temporal Temporal Temporal   SpO2:   95% 95%   Weight:       Height:         Abdomen: soft obese and non tender fundus  Perineum: no sig bleeding or discharge  Ext : non tender calves    No intake or output data in the 24 hours ending 07/17/20 0819    Current Facility-Administered Medications   Medication Dose Route Frequency Provider Last Rate Last Dose   • LR infusion   Intravenous PRN Sancho Rodriguez M.D.       • tetanus-dipth-acell pertussis (Tdap) inj 0.5 mL  0.5 mL Intramuscular Once PRN Sancho Rodriguez M.D.       • measles, mumps and rubella vaccine (MMR) injection 0.5 mL  0.5 mL Subcutaneous Once PRN Sancho Rodriguez M.D.       • ondansetron (ZOFRAN ODT) dispertab 4 mg  4 mg Oral Q6HRS PRN Sancho Rodriguez M.D.        Or   • ondansetron (ZOFRAN) syringe/vial injection 4 mg  4 mg Intravenous Q6HRS PRN Sancho Rodriguez M.D.       • oxytocin (PITOCIN) infusion (for postpartum)   mL/hr Intravenous Continuous Sancho Rodriguez M.D. 999 mL/hr at 07/15/20 2125 999 mL/hr at 07/15/20 2125   • ibuprofen (MOTRIN) tablet 600 mg  600 mg Oral Q6HRS PRN Sancho Rodriguez M.D.   600 mg at 07/16/20 2022   • HYDROcodone-acetaminophen (NORCO) 5-325 MG per tablet 1 Tab  1 Tab Oral Q4HRS PRN Sancho Rodriguez M.D.       • HYDROcodone-acetaminophen (NORCO) 5-325 MG per tablet 2 Tab  2 Tab Oral Q4HRS  SAMMY Rodriguez M.D.           A/P S/P  now doing well on PPD #1. Plan to proceed with the usual pp management. All questions were answered and she will proceed with the usual pp management and will be discharged home today.

## 2020-07-18 NOTE — DISCHARGE SUMMARY
DATE OF ADMISSION:  07/15/2020    DATE OF DISCHARGE:  2020    DISCHARGE DIAGNOSES:  1.  Status post spontaneous vaginal delivery.  2.  Uncomplicated postpartum course.    HISTORY OF PRESENT ILLNESS:  Briefly, this is a 19-year-old  4, para 1,   spontaneous  2 at 40 and 1/7th weeks' gestation who had a desire to   proceed forward with post-term augmentation of labor with Pitocin per   protocol.  Risks, benefits and alternatives have been addressed.  She asked   appropriate questions, signed the appropriate consents, wished to proceed   forward with admission.    PAST MEDICAL HISTORY AND PHYSICAL EXAM:  Can be found in dictated history and   physical.    ASSESSMENT AND PLAN:  A 19-year-old  4, para 1 post-term, now electing   to proceed forward with Pitocin augmentation.  She was group B streptococcus   positive.  DID HAVE ALLERGY TO PENICILLIN WHICH WAS DESCRIBED AS SEVERE and   thus was started on clindamycin.    HOSPITAL COURSE:  As stated above, the patient was initially observed.  She   did not have a reactive heart rate tracing to start the Pitocin; however, once   this was reassuring, the Pitocin was started, increased per protocol.  She   was started on clindamycin for which she had been given 2 doses prior to her   delivery.  With increasing amounts of Pitocin per protocol, she began having   repetitive late decelerations and the Pitocin was turned off.  Once the fetal   status was reassuring and after a period of allowing the fetus to rest, the   Pitocin was started, increased per protocol.   Once again, she was granted   continuous lumbar epidural.  She did have an artificial rupture of membranes   performed at approximately 3 cm dilation with an intrauterine pressure   catheter placed as well as a fetal scalp electrode.  Four hours later she was   examined, was found to have made only 1 cm of cervical change.  She had   mild-to-moderate variable decelerations as well as  intermittent late   decelerations, however, overall the tracing was reassuring.  Pitocin was   increased per protocol.  Patient progressed rapidly to anterior lip.  She   continued to have moderate-to-severe variable decelerations with intermittent   late decelerations; however, the patient was making progress, began to push,   and after a period of time subsequently underwent a spontaneous vaginal   delivery with a nuchal cord reduced on the perineum and over approximately 30   seconds Alphonso maneuver was performed, an attempt initially at the   possibility of a need of suprapubic pressure, however, given the patient's   morbid obesity, this was not felt to be successful; however, there was   subsequent easy delivery of the shoulders and body after approximately 30   seconds.  There was terminal meconium noted.  Apgars were assigned at 5 and 9   at one and five minutes, respectively.  The estimated blood loss for the   delivery was 100  mL.    In the postpartum period of time, the patient was initially observed and began   passing large clots.  She was given a dose of Cytotec, however, she continued   to pass large clots, and as stated previously, the patient is morbidly obese   and was very difficult to apply significant fundal pressure from the time of   delivery and at this time, and thus I did an examination at that time and   evacuated approximately 236 mL of blood clot on bimanual examination of the   vaginal vault and uterus with an estimated blood loss of approximately 800 mL   after delivery.  The patient was given fentanyl for this manual extraction of   the clot and appeared to tolerate this well.  In the postpartum period of   time, she had no further significant bleeding and on postpartum day #1, she   was doing well.  She was afebrile.  Her hemoglobin and hematocrit had changed   from prior at the time of admission of hemoglobin of 12.6 and hematocrit of   38.1 to a change of 11.0 and 33.9.  She  was hemodynamically stable, was   observed for an additional 24 hours, continued to remain afebrile.  The fundus   was now able to be palpated and was firm.  No significant bleeding was   appreciated and she was felt to be appropriate for discharge.    DISCHARGE PLAN:  To follow up in 6 weeks.    DISCHARGE INSTRUCTIONS:  She is to call with any increased temperature greater   than 100.4, increasing vaginal bleeding, abdominal pain unrelieved with any   p.o. pain medication.  Call with any other questions or concerns.       ____________________________________     MD JORDAN JUAREZ / MAYO    DD:  07/17/2020 08:18:53  DT:  07/18/2020 01:34:01    D#:  [id number]  Job#:  [id number]

## 2020-08-07 PROCEDURE — RXMED WILLOW AMBULATORY MEDICATION CHARGE: Performed by: SPECIALIST

## 2020-08-20 ENCOUNTER — PHARMACY VISIT (OUTPATIENT)
Dept: PHARMACY | Facility: MEDICAL CENTER | Age: 20
End: 2020-08-20
Payer: COMMERCIAL

## 2021-02-06 NOTE — ED PROVIDER NOTES
ED Provider Note    CHIEF COMPLAINT  Chief Complaint   Patient presents with   • Arm Swelling   • Arm Pain       HPI  Mitzi Castaneda is a 16 y.o. female who presents to the emergency department for evaluation of arm pain and swelling. Patient believes she had a bug bite in these locations 2 days ago. Patient has minimal swelling and minimal pain. No fevers chills. Also so some swelling to the 2nd digit.    Patient has history of methamphetamine abuse and states she is not use for the last 11 days. Last marijuana 2 days ago. Patient arrives in the emergency department without fever.    REVIEW OF SYSTEMS  See HPI for further details. All other systems are negative.     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   • Myopia 10/15/2010   • Wheeze 10/15/2010   • Eczema 10/15/2010   • H/O: Bell's palsy 12/26/08   • Insulin resistance 12/7/2011   • Major depressive disorder    • Borderline personality disorder        FAMILY HISTORY  No significant family history    SOCIAL HISTORY  Social History     Social History   • Marital Status: Single     Spouse Name: N/A   • Number of Children: N/A   • Years of Education: N/A     Social History Main Topics   • Smoking status: Current Every Day Smoker -- 1.00 packs/day     Types: Cigarettes   • Smokeless tobacco: Never Used      Comment: Mom smokes   • Alcohol Use: Yes      Comment: rare   • Drug Use: Yes     Special: Methamphetamines      Comment: Clean from meth for 11 days - Marijuana use was 2 days ago   • Sexual Activity:     Partners: Male     Birth Control/ Protection: Condom      Comment: 1 month ago     Other Topics Concern   • None     Social History Narrative       SURGICAL HISTORY  History reviewed. No pertinent past surgical history.    CURRENT MEDICATIONS  Home Medications     Reviewed by Alma Delia Torres R.N. (Registered Nurse) on 03/09/17 at 1751  Med List Status:  "Complete    Medication Last Dose Status          Patient Uli Taking any Medications                        ALLERGIES  Allergies   Allergen Reactions   • Penicillins        PHYSICAL EXAM  VITAL SIGNS: /68 mmHg  Pulse 100  Temp(Src) 36.6 °C (97.9 °F)  Resp 16  Ht 1.626 m (5' 4.02\")  Wt 113.9 kg (251 lb 1.7 oz)  BMI 43.08 kg/m2  SpO2 96%  Constitutional: Well developed, Well nourished, No acute distress, Non-toxic appearance.   HENT: Normocephalic, Atraumatic, Bilateral external ears normal, Oropharynx moist, no evidence of dehydration, No oral exudates, Nose normal.   Eyes: PERRLA, EOMI, Conjunctiva normal, No discharge.   Neck: Normal range of motion, No tenderness, Supple, No stridor. No masses. No evidence of meningitis or meningismus.   Lymphatic: No lymphadenopathy noted.    Thorax & Lungs: Nonlabored respirations   Abdomen: Nonsurgical abdomen findings.  Skin: Warm, Dry, minimal erythema to the upper arm and finger. No abscess. .   Extremities:  No edema, No tenderness, No cyanosis, No clubbing.   Musculoskeletal: Good range of motion in all major joints. No major deformities noted.   Neurologic: Alert & oriented x 3, Normal motor function, No focal deficits noted.   Psychiatric: Affect normal, mood normal.                        RADIOLOGY/PROCEDURES      COURSE & MEDICAL DECISION MAKING  Pertinent Labs & Imaging studies reviewed. (See chart for details)  Patient appears well no evidence of significant cellulitis. Patient discharged on clindamycin. Patient's follow-up increased redness or swelling. Instruction sheet and skin infections.     FINAL IMPRESSION  1. Cellulitis of left upper extremity              Electronically signed by: Juan Alberto Alexander, 3/20/2017 9:03 PM      " 90

## 2021-08-21 ENCOUNTER — OFFICE VISIT (OUTPATIENT)
Dept: URGENT CARE | Facility: CLINIC | Age: 21
End: 2021-08-21
Payer: COMMERCIAL

## 2021-08-21 ENCOUNTER — HOSPITAL ENCOUNTER (OUTPATIENT)
Facility: MEDICAL CENTER | Age: 21
End: 2021-08-21
Attending: PHYSICIAN ASSISTANT
Payer: MEDICAID

## 2021-08-21 VITALS
DIASTOLIC BLOOD PRESSURE: 80 MMHG | OXYGEN SATURATION: 96 % | BODY MASS INDEX: 50.02 KG/M2 | SYSTOLIC BLOOD PRESSURE: 140 MMHG | HEIGHT: 64 IN | WEIGHT: 293 LBS | RESPIRATION RATE: 16 BRPM | TEMPERATURE: 97.6 F | HEART RATE: 115 BPM

## 2021-08-21 DIAGNOSIS — R19.7 DIARRHEA, UNSPECIFIED TYPE: ICD-10-CM

## 2021-08-21 DIAGNOSIS — R53.83 FATIGUE, UNSPECIFIED TYPE: ICD-10-CM

## 2021-08-21 PROBLEM — F41.9 ANXIETY: Status: ACTIVE | Noted: 2019-03-15

## 2021-08-21 PROBLEM — F31.81 BIPOLAR 2 DISORDER (HCC): Status: ACTIVE | Noted: 2019-03-15

## 2021-08-21 PROCEDURE — U0003 INFECTIOUS AGENT DETECTION BY NUCLEIC ACID (DNA OR RNA); SEVERE ACUTE RESPIRATORY SYNDROME CORONAVIRUS 2 (SARS-COV-2) (CORONAVIRUS DISEASE [COVID-19]), AMPLIFIED PROBE TECHNIQUE, MAKING USE OF HIGH THROUGHPUT TECHNOLOGIES AS DESCRIBED BY CMS-2020-01-R: HCPCS

## 2021-08-21 PROCEDURE — U0005 INFEC AGEN DETEC AMPLI PROBE: HCPCS

## 2021-08-21 PROCEDURE — 99213 OFFICE O/P EST LOW 20 MIN: CPT | Performed by: PHYSICIAN ASSISTANT

## 2021-08-21 RX ORDER — ARIPIPRAZOLE 400 MG
KIT INTRAMUSCULAR
COMMUNITY
Start: 2021-08-02 | End: 2022-08-06

## 2021-08-21 ASSESSMENT — ENCOUNTER SYMPTOMS
HEADACHES: 0
VOMITING: 0
FEVER: 0
CHILLS: 0
MYALGIAS: 1
ABDOMINAL PAIN: 1
COUGH: 0
SORE THROAT: 0
NAUSEA: 0
PALPITATIONS: 0
SHORTNESS OF BREATH: 0
DIZZINESS: 0
DIARRHEA: 1

## 2021-08-21 ASSESSMENT — FIBROSIS 4 INDEX: FIB4 SCORE: 0.61

## 2021-08-21 NOTE — PROGRESS NOTES
Subjective     Mitzi Abel is a 20 y.o. female who presents with Diarrhea (stomach pain x 2 days)    HPI:  Mitzi Abel is a 20 y.o. female who presents today for evaluation of diarrhea.  Patient reports that yesterday she started to develop a few episodes of diarrhea with associated abdominal cramping.  She also reports that she has had some mild body aches and general feeling of malaise over the past 24 hours.  She has not had any fever/chills.  No nausea or vomiting.  Her  has been sick for the past week with symptoms of diarrhea.  She is not vaccinated for COVID-19 virus.      Review of Systems   Constitutional: Positive for malaise/fatigue. Negative for chills and fever.   HENT: Negative for congestion and sore throat.    Respiratory: Negative for cough and shortness of breath.    Cardiovascular: Negative for chest pain and palpitations.   Gastrointestinal: Positive for abdominal pain and diarrhea. Negative for nausea and vomiting.   Musculoskeletal: Positive for myalgias.   Skin: Negative for rash.   Neurological: Negative for dizziness and headaches.         PMH:  has a past medical history of Anxiety, Asthma, Bipolar 2 disorder, Borderline personality disorder (HCC), Eczema (10/15/2010), H/O: Bell's palsy (12/26/08), Major depressive disorder, Myopia (10/15/2010), and Obesity.  MEDS: No current outpatient medications on file.  ALLERGIES:   Allergies   Allergen Reactions   • Penicillins Anaphylaxis     SURGHX: History reviewed. No pertinent surgical history.  SOCHX:  reports that she has quit smoking. Her smoking use included cigarettes. She has a 5.50 pack-year smoking history. She has never used smokeless tobacco. She reports previous alcohol use. She reports previous drug use.  FH: Family history was reviewed, no pertinent findings to report    Objective     /80 (BP Location: Left arm, Patient Position: Sitting, BP Cuff Size: Adult)   Pulse (!) 115   Temp 36.4 °C (97.6  "°F) (Temporal)   Resp 16   Ht 1.626 m (5' 4\")   Wt (!) 158 kg (349 lb)   SpO2 96%   BMI 59.91 kg/m²      Physical Exam  Constitutional:       Appearance: She is well-developed. She is obese.   HENT:      Head: Normocephalic and atraumatic.      Right Ear: Tympanic membrane, ear canal and external ear normal.      Left Ear: Tympanic membrane, ear canal and external ear normal.      Nose: Nose normal. No congestion.      Mouth/Throat:      Lips: Pink.      Mouth: Mucous membranes are moist.      Pharynx: Oropharynx is clear.   Eyes:      Conjunctiva/sclera: Conjunctivae normal.      Pupils: Pupils are equal, round, and reactive to light.   Cardiovascular:      Rate and Rhythm: Regular rhythm. Tachycardia present.      Heart sounds: Normal heart sounds. No murmur heard.     Pulmonary:      Effort: Pulmonary effort is normal.      Breath sounds: Normal breath sounds. No decreased breath sounds, wheezing, rhonchi or rales.   Musculoskeletal:      Cervical back: Normal range of motion.   Lymphadenopathy:      Cervical: No cervical adenopathy.   Skin:     General: Skin is warm and dry.      Capillary Refill: Capillary refill takes less than 2 seconds.   Neurological:      Mental Status: She is alert and oriented to person, place, and time.   Psychiatric:         Behavior: Behavior normal.         Judgment: Judgment normal.              Assessment & Plan        1. Diarrhea, unspecified type  - COVID/SARS CoV-2 PCR; Future  - PO fluids    2. Fatigue, unspecified type  - COVID/SARS CoV-2 PCR; Future      - OTC cold/flu medications  - PO fluids  - Rest  - Tylenol or ibuprofen as needed for fever > 100.4 F        *Patient had a nasal swab to test for COVID-19 virus.  Patient was advised to stay home and self isolate/self quarantine while awaiting the results.  Supportive care was reiterated.  Return/ER precautions discussed.       Differential Diagnosis, natural history, and supportive care discussed. Return to the Urgent " Care or follow up with your PCP if symptoms fail to resolve, or for any new or worsening symptoms. Emergency room precautions discussed. Patient and/or family appears understanding of information.

## 2021-08-22 DIAGNOSIS — R53.83 FATIGUE, UNSPECIFIED TYPE: ICD-10-CM

## 2021-08-22 DIAGNOSIS — R19.7 DIARRHEA, UNSPECIFIED TYPE: ICD-10-CM

## 2022-01-24 ENCOUNTER — HOSPITAL ENCOUNTER (OUTPATIENT)
Facility: MEDICAL CENTER | Age: 22
End: 2022-01-24
Attending: SPECIALIST
Payer: COMMERCIAL

## 2022-01-24 PROCEDURE — 87591 N.GONORRHOEAE DNA AMP PROB: CPT

## 2022-01-24 PROCEDURE — 87491 CHLMYD TRACH DNA AMP PROBE: CPT

## 2022-01-24 PROCEDURE — 88175 CYTOPATH C/V AUTO FLUID REDO: CPT

## 2022-01-24 PROCEDURE — 87624 HPV HI-RISK TYP POOLED RSLT: CPT

## 2022-01-27 LAB — AMBIGUOUS DTTM AMBI4: NORMAL

## 2022-01-30 LAB
C TRACH DNA GENITAL QL NAA+PROBE: NEGATIVE
CYTOLOGY REG CYTOL: ABNORMAL
HPV HR 12 DNA CVX QL NAA+PROBE: POSITIVE
HPV16 DNA SPEC QL NAA+PROBE: NEGATIVE
HPV18 DNA SPEC QL NAA+PROBE: NEGATIVE
N GONORRHOEA DNA GENITAL QL NAA+PROBE: NEGATIVE
SPECIMEN SOURCE: ABNORMAL
SPECIMEN SOURCE: ABNORMAL

## 2022-01-31 ENCOUNTER — HOSPITAL ENCOUNTER (OUTPATIENT)
Facility: MEDICAL CENTER | Age: 22
End: 2022-01-31
Attending: SPECIALIST
Payer: COMMERCIAL

## 2022-01-31 LAB — PATHOLOGY CONSULT NOTE: NORMAL

## 2022-01-31 PROCEDURE — 88305 TISSUE EXAM BY PATHOLOGIST: CPT | Mod: 59

## 2022-03-14 ENCOUNTER — OFFICE VISIT (OUTPATIENT)
Dept: URGENT CARE | Facility: CLINIC | Age: 22
End: 2022-03-14
Payer: COMMERCIAL

## 2022-03-14 VITALS
DIASTOLIC BLOOD PRESSURE: 90 MMHG | TEMPERATURE: 97.5 F | OXYGEN SATURATION: 97 % | RESPIRATION RATE: 16 BRPM | WEIGHT: 293 LBS | HEART RATE: 120 BPM | HEIGHT: 64 IN | SYSTOLIC BLOOD PRESSURE: 132 MMHG | BODY MASS INDEX: 50.02 KG/M2

## 2022-03-14 DIAGNOSIS — J98.8 RTI (RESPIRATORY TRACT INFECTION): ICD-10-CM

## 2022-03-14 DIAGNOSIS — J45.21 MILD INTERMITTENT ASTHMA WITH EXACERBATION: ICD-10-CM

## 2022-03-14 PROCEDURE — 99214 OFFICE O/P EST MOD 30 MIN: CPT | Performed by: PHYSICIAN ASSISTANT

## 2022-03-14 RX ORDER — METHYLPREDNISOLONE 4 MG/1
TABLET ORAL
Qty: 21 TABLET | Refills: 0 | Status: SHIPPED | OUTPATIENT
Start: 2022-03-14 | End: 2022-10-29

## 2022-03-14 RX ORDER — ALBUTEROL SULFATE 90 UG/1
2 AEROSOL, METERED RESPIRATORY (INHALATION) EVERY 6 HOURS PRN
Qty: 8.5 G | Refills: 0 | Status: SHIPPED | OUTPATIENT
Start: 2022-03-14 | End: 2022-10-29

## 2022-03-14 RX ORDER — DOXYCYCLINE HYCLATE 100 MG
100 TABLET ORAL 2 TIMES DAILY
Qty: 14 TABLET | Refills: 0 | Status: SHIPPED | OUTPATIENT
Start: 2022-03-14 | End: 2022-03-21

## 2022-03-14 ASSESSMENT — ENCOUNTER SYMPTOMS
FEVER: 0
SINUS PAIN: 1
VOMITING: 0
SORE THROAT: 1
SHORTNESS OF BREATH: 1
NAUSEA: 0
COUGH: 1
CHILLS: 0
WHEEZING: 1

## 2022-03-14 NOTE — PROGRESS NOTES
Subjective:   Mitzi Abel is a 21 y.o. female who presents for Otalgia (X 3 days (L) with nasal congestion and cough.  Denies fever or chills.  Unvaccinated for Covid. Hx of exercise induced Asthma.)        Patient presents with concerns of nasal congestion and cough for the last week, left-sided ear pain for the last 3 days as well as laryngitis for the last several weeks.  She also endorses sinus pressure.  Cough is nonproductive.  Overall symptoms have been worsening.  She endorses mild shortness of breath and occasional wheezing.  She does feel like her asthma has been flaring up lately.  Unfortunately she does not have an albuterol inhaler at home currently.  She denies fevers, chills, body aches.  She does not endorse general malaise and fatigue.  She is taken NyQuil for symptoms with minimal symptomatic improvement.    Review of Systems   Constitutional: Positive for malaise/fatigue. Negative for chills and fever.   HENT: Positive for congestion, ear pain, sinus pain and sore throat.    Respiratory: Positive for cough, shortness of breath and wheezing.    Gastrointestinal: Negative for nausea and vomiting.       PMH:  has a past medical history of Anxiety, Asthma, Bipolar 2 disorder, Borderline personality disorder (HCC), Eczema (10/15/2010), H/O: Bell's palsy (12/26/08), Major depressive disorder, Myopia (10/15/2010), and Obesity.  MEDS:   Current Outpatient Medications:   •  albuterol 108 (90 Base) MCG/ACT Aero Soln inhalation aerosol, Inhale 2 Puffs every 6 hours as needed for Shortness of Breath., Disp: 8.5 g, Rfl: 0  •  methylPREDNISolone (MEDROL DOSEPAK) 4 MG Tablet Therapy Pack, Follow schedule on package instructions., Disp: 21 Tablet, Rfl: 0  •  doxycycline (VIBRAMYCIN) 100 MG Tab, Take 1 Tablet by mouth 2 times a day for 7 days., Disp: 14 Tablet, Rfl: 0  •  ABILIFY MAINTENA 400 MG IM injection, Inject 400 mg intramuscularly once a month (Patient not taking: Reported on 3/14/2022), Disp: ,  "Rfl:   ALLERGIES:   Allergies   Allergen Reactions   • Penicillins Anaphylaxis     SURGHX: No past surgical history on file.  SOCHX:  reports that she has quit smoking. Her smoking use included cigarettes. She has a 5.50 pack-year smoking history. She has never used smokeless tobacco. She reports previous alcohol use. She reports previous drug use.  FH: Family history was reviewed, no pertinent findings to report   Objective:   /90   Pulse (!) 120   Temp 36.4 °C (97.5 °F)   Resp 16   Ht 1.626 m (5' 4\")   Wt (!) 159 kg (350 lb)   LMP  (LMP Unknown)   SpO2 97%   BMI 60.08 kg/m²   Physical Exam  Vitals reviewed.   Constitutional:       General: She is not in acute distress.     Appearance: Normal appearance. She is well-developed. She is not toxic-appearing.   HENT:      Head: Normocephalic and atraumatic.      Right Ear: Tympanic membrane, ear canal and external ear normal.      Left Ear: Ear canal and external ear normal. A middle ear effusion is present.      Nose: Congestion present.      Right Sinus: Maxillary sinus tenderness present.      Left Sinus: Maxillary sinus tenderness present.      Mouth/Throat:      Lips: Pink.      Mouth: Mucous membranes are moist.      Pharynx: Oropharynx is clear. Uvula midline.   Cardiovascular:      Rate and Rhythm: Regular rhythm. Tachycardia present.      Heart sounds: Normal heart sounds, S1 normal and S2 normal.   Pulmonary:      Effort: Pulmonary effort is normal. No tachypnea or respiratory distress.      Breath sounds: No stridor. Examination of the right-upper field reveals wheezing. Examination of the right-middle field reveals wheezing. Examination of the right-lower field reveals wheezing. Wheezing (Fine, expiratory.) present. No decreased breath sounds, rhonchi or rales.      Comments: Speaks comfortably in full sentences.  Patient's voice is hoarse.  Skin:     General: Skin is dry.   Neurological:      Comments: Alert and oriented.    Psychiatric:       "   Speech: Speech normal.         Behavior: Behavior normal.           Assessment/Plan:   1. RTI (respiratory tract infection)  - methylPREDNISolone (MEDROL DOSEPAK) 4 MG Tablet Therapy Pack; Follow schedule on package instructions.  Dispense: 21 Tablet; Refill: 0  - doxycycline (VIBRAMYCIN) 100 MG Tab; Take 1 Tablet by mouth 2 times a day for 7 days.  Dispense: 14 Tablet; Refill: 0    2. Mild intermittent asthma with exacerbation  - albuterol 108 (90 Base) MCG/ACT Aero Soln inhalation aerosol; Inhale 2 Puffs every 6 hours as needed for Shortness of Breath.  Dispense: 8.5 g; Refill: 0  - methylPREDNISolone (MEDROL DOSEPAK) 4 MG Tablet Therapy Pack; Follow schedule on package instructions.  Dispense: 21 Tablet; Refill: 0  - doxycycline (VIBRAMYCIN) 100 MG Tab; Take 1 Tablet by mouth 2 times a day for 7 days.  Dispense: 14 Tablet; Refill: 0    1. Patient appears to have an infection with both upper and lower respiratory involvement.  Exam today consistent with sinusitis, but developing pneumonia is also consideration.  Considered obtaining chest x-ray today, but unfortunately we do not have imaging on site.  Patient started on antibiotic therapy.  OTC antitussives as needed.  If no improvement in the next 48 to 72 hours, new symptoms develop at any point if symptoms worsen return to clinic for reevaluation at which time we will likely need to obtain imaging.    Declines testing for Covid 19.    2.  Patient also appears to be experiencing a mild asthma flare.  Patient started on a Medrol Dosepak for this.  I would like her to use albuterol as needed.  If symptoms worsen I would like her to go to the emergency room for further evaluation and management.  Follow-up with PCP.    Differential diagnosis, natural history, supportive care, and indications for immediate follow-up discussed.

## 2022-08-06 ENCOUNTER — OFFICE VISIT (OUTPATIENT)
Dept: URGENT CARE | Facility: CLINIC | Age: 22
End: 2022-08-06
Payer: COMMERCIAL

## 2022-08-06 ENCOUNTER — HOSPITAL ENCOUNTER (OUTPATIENT)
Facility: MEDICAL CENTER | Age: 22
End: 2022-08-06
Attending: FAMILY MEDICINE
Payer: COMMERCIAL

## 2022-08-06 VITALS
DIASTOLIC BLOOD PRESSURE: 78 MMHG | RESPIRATION RATE: 20 BRPM | HEIGHT: 64 IN | OXYGEN SATURATION: 97 % | WEIGHT: 293 LBS | SYSTOLIC BLOOD PRESSURE: 128 MMHG | HEART RATE: 124 BPM | TEMPERATURE: 99 F | BODY MASS INDEX: 50.02 KG/M2

## 2022-08-06 DIAGNOSIS — J02.0 ACUTE STREPTOCOCCAL PHARYNGITIS: ICD-10-CM

## 2022-08-06 DIAGNOSIS — Z03.818 ENCOUNTER FOR PATIENT CONCERN ABOUT EXPOSURE TO INFECTIOUS ORGANISM: ICD-10-CM

## 2022-08-06 DIAGNOSIS — R00.0 TACHYCARDIA: ICD-10-CM

## 2022-08-06 LAB
INT CON NEG: NORMAL
INT CON POS: NORMAL
S PYO AG THROAT QL: POSITIVE

## 2022-08-06 PROCEDURE — 99214 OFFICE O/P EST MOD 30 MIN: CPT | Performed by: FAMILY MEDICINE

## 2022-08-06 PROCEDURE — U0005 INFEC AGEN DETEC AMPLI PROBE: HCPCS

## 2022-08-06 PROCEDURE — U0003 INFECTIOUS AGENT DETECTION BY NUCLEIC ACID (DNA OR RNA); SEVERE ACUTE RESPIRATORY SYNDROME CORONAVIRUS 2 (SARS-COV-2) (CORONAVIRUS DISEASE [COVID-19]), AMPLIFIED PROBE TECHNIQUE, MAKING USE OF HIGH THROUGHPUT TECHNOLOGIES AS DESCRIBED BY CMS-2020-01-R: HCPCS

## 2022-08-06 PROCEDURE — 87880 STREP A ASSAY W/OPTIC: CPT | Performed by: FAMILY MEDICINE

## 2022-08-06 RX ORDER — AZITHROMYCIN 500 MG/1
500 TABLET, FILM COATED ORAL DAILY
Qty: 5 TABLET | Refills: 0 | Status: SHIPPED | OUTPATIENT
Start: 2022-08-06 | End: 2022-08-11

## 2022-08-06 RX ORDER — RISPERIDONE 120 MG
KIT SUBCUTANEOUS
COMMUNITY
Start: 2022-07-23 | End: 2023-01-28

## 2022-08-06 NOTE — PROGRESS NOTES
"  Subjective:      21 y.o. female presents to urgent care for cold symptoms that started last nght. She is experiencing headache, body aches, fever, and sore throat.  She is tachycardic here in urgent care, does not have a history of this.  She denies any chest pain, palpitations, shortness of breath no cough or diarrhea. She hasn't been using anything for her symptoms. She smokes an average of 10-15 cigarettes per day. No history of asthma or COPD. She is not vaccinated against COVID. No known sick contacts.     She denies any other questions or concerns at this time.    Current problem list, medication, and past medical/surgical history were reviewed in Epic.    ROS  See HPI     Objective:      /78 (BP Location: Left arm, Patient Position: Sitting, BP Cuff Size: Adult)   Pulse (!) 124   Temp 37.2 °C (99 °F) (Temporal)   Resp 20   Ht 1.626 m (5' 4\")   Wt (!) 164 kg (362 lb)   SpO2 97%   BMI 62.14 kg/m²     Physical Exam  Constitutional:       General: She is not in acute distress.     Appearance: She is not diaphoretic.   HENT:      Right Ear: Tympanic membrane, ear canal and external ear normal.      Left Ear: Tympanic membrane, ear canal and external ear normal.      Mouth/Throat:      Pharynx: Uvula midline.      Tonsils: No tonsillar exudate.   Cardiovascular:      Rate and Rhythm: Regular rhythm. Tachycardia present.      Heart sounds: Normal heart sounds.   Pulmonary:      Effort: Pulmonary effort is normal. No respiratory distress.      Breath sounds: Normal breath sounds.   Neurological:      Mental Status: She is alert.   Psychiatric:         Mood and Affect: Affect normal.         Judgment: Judgment normal.       Assessment/Plan:     1. Encounter for patient concern about exposure to infectious organism  2. Tachycardia  3. Acute streptococcal pharyngitis  Systemic symptoms seen through tachycardia.  Rapid strep positive, she is penicillin allergic.  Prescription for azithromycin has been " sent. Testing performed for COVID-19. In the meantime patient was advised to isolate until COVID test results returned. I encouraged mask use, frequent handwashing, wiping down hard surfaces, etc. Tylenol and Ibuprofen were recommended for symptomatic relief. If positive they will be contacted by their local health department regarding return to work/school protocols. Patient is currently without indication of need for higher level of care. Patient/Guardian was given precautionary signs/symptoms that mandate immediate follow up and evaluation in the ED. The patient and/or guardian demonstrated a good understanding and agreed with the treatment plan.  - SARS-CoV-2 PCR (24 hour In-House): Collect NP swab in VTM; Future  - POCT Rapid Strep A  - azithromycin (ZITHROMAX) 500 MG tablet; Take 1 Tablet by mouth every day for 5 days.  Dispense: 5 Tablet; Refill: 0      Patches from pcp 7/25/2022. Never quit before     Instructed to return to Urgent Care or nearest Emergency Department if symptoms fail to improve, for any change in condition, further concerns, or new concerning symptoms. Patient states understanding of the plan of care and discharge instructions.    Lana Lucero M.D.

## 2022-08-10 ENCOUNTER — HOSPITAL ENCOUNTER (EMERGENCY)
Facility: MEDICAL CENTER | Age: 22
End: 2022-08-11
Attending: EMERGENCY MEDICINE
Payer: COMMERCIAL

## 2022-08-10 VITALS
SYSTOLIC BLOOD PRESSURE: 132 MMHG | HEIGHT: 64 IN | DIASTOLIC BLOOD PRESSURE: 83 MMHG | TEMPERATURE: 97.5 F | OXYGEN SATURATION: 94 % | BODY MASS INDEX: 50.02 KG/M2 | WEIGHT: 293 LBS | RESPIRATION RATE: 18 BRPM | HEART RATE: 90 BPM

## 2022-08-10 DIAGNOSIS — M79.89 ARM SWELLING: ICD-10-CM

## 2022-08-10 DIAGNOSIS — L03.119 CELLULITIS OF UPPER EXTREMITY, UNSPECIFIED LATERALITY: ICD-10-CM

## 2022-08-10 DIAGNOSIS — F19.90 IVDU (INTRAVENOUS DRUG USER): ICD-10-CM

## 2022-08-10 PROCEDURE — 80074 ACUTE HEPATITIS PANEL: CPT

## 2022-08-10 PROCEDURE — 700102 HCHG RX REV CODE 250 W/ 637 OVERRIDE(OP): Performed by: EMERGENCY MEDICINE

## 2022-08-10 PROCEDURE — 36415 COLL VENOUS BLD VENIPUNCTURE: CPT

## 2022-08-10 PROCEDURE — 87389 HIV-1 AG W/HIV-1&-2 AB AG IA: CPT

## 2022-08-10 PROCEDURE — A9270 NON-COVERED ITEM OR SERVICE: HCPCS | Performed by: EMERGENCY MEDICINE

## 2022-08-10 PROCEDURE — 85025 COMPLETE CBC W/AUTO DIFF WBC: CPT

## 2022-08-10 PROCEDURE — 99283 EMERGENCY DEPT VISIT LOW MDM: CPT

## 2022-08-10 PROCEDURE — 87040 BLOOD CULTURE FOR BACTERIA: CPT

## 2022-08-10 PROCEDURE — 80053 COMPREHEN METABOLIC PANEL: CPT

## 2022-08-10 RX ORDER — CEPHALEXIN 250 MG/1
500 CAPSULE ORAL ONCE
Status: COMPLETED | OUTPATIENT
Start: 2022-08-10 | End: 2022-08-10

## 2022-08-10 RX ORDER — SULFAMETHOXAZOLE AND TRIMETHOPRIM 800; 160 MG/1; MG/1
1 TABLET ORAL ONCE
Status: COMPLETED | OUTPATIENT
Start: 2022-08-10 | End: 2022-08-10

## 2022-08-10 RX ADMIN — SULFAMETHOXAZOLE AND TRIMETHOPRIM 1 TABLET: 800; 160 TABLET ORAL at 23:57

## 2022-08-10 RX ADMIN — CEPHALEXIN 500 MG: 250 CAPSULE ORAL at 23:57

## 2022-08-11 LAB
ALBUMIN SERPL BCP-MCNC: 3.7 G/DL (ref 3.2–4.9)
ALBUMIN/GLOB SERPL: 1.1 G/DL
ALP SERPL-CCNC: 85 U/L (ref 30–99)
ALT SERPL-CCNC: 24 U/L (ref 2–50)
ANION GAP SERPL CALC-SCNC: 12 MMOL/L (ref 7–16)
AST SERPL-CCNC: 12 U/L (ref 12–45)
BASOPHILS # BLD AUTO: 0.5 % (ref 0–1.8)
BASOPHILS # BLD: 0.04 K/UL (ref 0–0.12)
BILIRUB SERPL-MCNC: 0.4 MG/DL (ref 0.1–1.5)
BUN SERPL-MCNC: 13 MG/DL (ref 8–22)
CALCIUM SERPL-MCNC: 8.9 MG/DL (ref 8.4–10.2)
CHLORIDE SERPL-SCNC: 102 MMOL/L (ref 96–112)
CO2 SERPL-SCNC: 22 MMOL/L (ref 20–33)
CREAT SERPL-MCNC: 0.67 MG/DL (ref 0.5–1.4)
EOSINOPHIL # BLD AUTO: 0.12 K/UL (ref 0–0.51)
EOSINOPHIL NFR BLD: 1.4 % (ref 0–6.9)
ERYTHROCYTE [DISTWIDTH] IN BLOOD BY AUTOMATED COUNT: 39.5 FL (ref 35.9–50)
GFR SERPLBLD CREATININE-BSD FMLA CKD-EPI: 125 ML/MIN/1.73 M 2
GLOBULIN SER CALC-MCNC: 3.5 G/DL (ref 1.9–3.5)
GLUCOSE SERPL-MCNC: 133 MG/DL (ref 65–99)
HAV IGM SERPL QL IA: NORMAL
HBV CORE IGM SER QL: NORMAL
HBV SURFACE AG SER QL: NORMAL
HCT VFR BLD AUTO: 37.6 % (ref 37–47)
HCV AB SER QL: NORMAL
HGB BLD-MCNC: 12 G/DL (ref 12–16)
HIV 1+2 AB+HIV1 P24 AG SERPL QL IA: NORMAL
IMM GRANULOCYTES # BLD AUTO: 0.11 K/UL (ref 0–0.11)
IMM GRANULOCYTES NFR BLD AUTO: 1.3 % (ref 0–0.9)
LYMPHOCYTES # BLD AUTO: 2.59 K/UL (ref 1–4.8)
LYMPHOCYTES NFR BLD: 30.8 % (ref 22–41)
MCH RBC QN AUTO: 27.6 PG (ref 27–33)
MCHC RBC AUTO-ENTMCNC: 31.9 G/DL (ref 33.6–35)
MCV RBC AUTO: 86.6 FL (ref 81.4–97.8)
MONOCYTES # BLD AUTO: 0.52 K/UL (ref 0–0.85)
MONOCYTES NFR BLD AUTO: 6.2 % (ref 0–13.4)
NEUTROPHILS # BLD AUTO: 5.02 K/UL (ref 2–7.15)
NEUTROPHILS NFR BLD: 59.8 % (ref 44–72)
NRBC # BLD AUTO: 0 K/UL
NRBC BLD-RTO: 0 /100 WBC
PLATELET # BLD AUTO: 233 K/UL (ref 164–446)
PMV BLD AUTO: 9.9 FL (ref 9–12.9)
POTASSIUM SERPL-SCNC: 3.6 MMOL/L (ref 3.6–5.5)
PROT SERPL-MCNC: 7.2 G/DL (ref 6–8.2)
RBC # BLD AUTO: 4.34 M/UL (ref 4.2–5.4)
SODIUM SERPL-SCNC: 136 MMOL/L (ref 135–145)
WBC # BLD AUTO: 8.4 K/UL (ref 4.8–10.8)

## 2022-08-11 RX ORDER — CEPHALEXIN 500 MG/1
500 CAPSULE ORAL 4 TIMES DAILY
Qty: 28 CAPSULE | Refills: 0 | Status: SHIPPED | OUTPATIENT
Start: 2022-08-11 | End: 2022-08-18

## 2022-08-11 RX ORDER — SULFAMETHOXAZOLE AND TRIMETHOPRIM 800; 160 MG/1; MG/1
1 TABLET ORAL 2 TIMES DAILY
Qty: 14 TABLET | Refills: 0 | Status: SHIPPED | OUTPATIENT
Start: 2022-08-11 | End: 2022-08-18

## 2022-08-11 NOTE — ED NOTES
Called lab and spoke with Rosi to request lab draw. Rosi reports all techs are currently upstairs

## 2022-08-11 NOTE — ED PROVIDER NOTES
"ED Provider Note    CHIEF COMPLAINT  Chief Complaint   Patient presents with    Arm Swelling     BUE  Associated with redness and pain Onset today  Pt states she has been skin popping meth and heroin in the same sites for some time   No documented fever Few scabbed lesions     HPI  Patient is a 21-year-old female with past medical history of anxiety, borderline personality disorder, insulin sensitivity and IV drug use who presents emergency room for concerns regarding multiple areas of wounds on the left and right arm.  She has noticed some swelling and discomfort with touch along with redness and irritation.  She intermittently uses IV methamphetamine and reports that she \"missed\" on several occasions over the last several days.  Last use was 48 hours ago.  She has concerns about sharing needles and is trying to get clean is requesting HIV and hepatitis testing as well.  She has no numbness, tingling, palpitations, chest pain or shortness of breath.  She has had some occasional subjective chills but no measured fevers and currently has no other acute constitutional complaints.  No skin discoloration of the distal portion of the extremity is just small areas of redness and swelling at the injection sites.    PPE Note: I personally donned full PPE for all patient encounters during this visit, including being clean-shaven with an N95 respirator mask, gloves, and goggles.     REVIEW OF SYSTEMS  See HPI for further details. All other systems are negative.     PAST MEDICAL HISTORY   has a past medical history of Anxiety, Asthma, Bipolar 2 disorder, Borderline personality disorder (HCC), Eczema (10/15/2010), H/O: Bell's palsy (12/26/08), Major depressive disorder, Myopia (10/15/2010), and Obesity.    SOCIAL HISTORY  Social History     Tobacco Use    Smoking status: Former     Packs/day: 0.50     Years: 11.00     Pack years: 5.50     Types: Cigarettes    Smokeless tobacco: Never   Vaping Use    Vaping Use: Never used " "  Substance and Sexual Activity    Alcohol use: Not Currently    Drug use: Yes     Types: Intravenous, Injected (Skin Popping)     Comment: meth and heroin    Sexual activity: Yes     Partners: Male     Comment:      SURGICAL HISTORY  patient denies any surgical history    CURRENT MEDICATIONS  Home Medications    **Home medications have not yet been reviewed for this encounter**       ALLERGIES  Allergies   Allergen Reactions    Penicillins Anaphylaxis     PHYSICAL EXAM  VITAL SIGNS: /83   Pulse 90   Temp 36.4 °C (97.5 °F) (Temporal)   Resp 18   Ht 1.626 m (5' 4\")   Wt (!) 161 kg (354 lb 4.5 oz)   SpO2 94%   BMI 60.81 kg/m²   Pulse ox interpretation: I interpret this pulse ox as normal.  Genl: F sitting in gurney comfortably, speaking clearly, appears in no acute distress  Head: NC/AT   ENT: Mucous membranes moist, posterior pharynx clear, uvula midline, nares patent bilaterally   Eyes: Normal sclera, pupils equal round reactive to light  Neck: Supple, FROM, no LAD appreciated  Pulmonary: Lungs are clear to auscultation bilaterally  Chest: No TTP  CV:  RRR, no murmur appreciated, pulses 2+ in both upper and lower extremities,  Abdomen: soft, NT/ND; no rebound/guarding, no masses palpated, no HSM  : no CVA or suprapubic tenderness  Musculoskeletal: Pain free ROM of the neck. Moving upper and lower extremities and spontaneous in coordinated fashion  Upper Extremities  - Skin: Left upper arm has 2 areas of erythema warmth and induration.  Punctate area of prior injection site, no active purulence.  Right arm there is a old healing injection site, small red area of induration adjacent with no fluctuance.  All sites of regional tenderness.  No esters, no blebs, no crepitus appreciated.  No abrasions, no lacerations, no ecchymosis  - Motor: Full ROM at shoulder, elbow, wrist; 5/5 wrist flexion/extension, thumb IP joint flexion/extension (AIN/PIN), abduction/adduction (ulnar) intact bilaterally  - " "Sensation intact to median/ulnar/radial nerves  - 2+ radial pulse, < 2 sec cap refill x 5 digits  ?  DIAGNOSTIC STUDIES / PROCEDURES    LABS  Labs Reviewed   CBC WITH DIFFERENTIAL - Abnormal; Notable for the following components:       Result Value    MCHC 31.9 (*)     Immature Granulocytes 1.30 (*)     All other components within normal limits   COMP METABOLIC PANEL - Abnormal; Notable for the following components:    Glucose 133 (*)     All other components within normal limits   ESTIMATED GFR   BLOOD CULTURE    Narrative:     Per Hospital Policy: Only change Specimen Src: to \"Line\" if  specified by physician order.   HEPATITIS PANEL ACUTE(4 COMPONENTS)   HIV AG/AB COMBO ASSAY SCREENING     RADIOLOGY  No orders to display     COURSE & MEDICAL DECISION MAKING  Pertinent Labs & Imaging studies reviewed. (See chart for details)    DDX:abscess, cellulitis, vasculitis/thrombophlebitis, phlebitis, neurovascular compromise    MDM  Initial evaluation at 2258:  Patient is seen and evaluated for symptoms as described above.  The patient presents with no measured fevers, no tachycardia and has multiple sites of injection.  She has no new appreciable murmurs, she does have some recent subjective chills and is high risk for possible blood-borne infection.  Thankfully she is not septic by vital signs and has no gross findings of neurovascular compromise on her distal extremity exam.  Bedside ultrasound is used for evaluation of these injection sites and no identifiable phlegmon or abscess shown.  She is treated with initial dose of Bactrim and Keflex here.  She is requesting HIV and hepatitis testing.  This will not be immediately processed however can be followed up by myself tomorrow morning.  Lab work and single set of cultures is also obtained due to the high risk behavior involved.  During the period of observation, patient is feeling improved, lab work shows no leukocytosis, no gross electrolyte abnormalities, no MARIA EUGENIA or " other concerning features.  At this time the patient has peripheral areas of cellulitis possible early phlegmon and needs continued antibiosis in the outpatient setting for the next 7 days.  Repeat wound check would be recommended in 48 hours, sooner if patient develops measured fevers, chills, is not acting like her self.    FINAL IMPRESSION  Visit Diagnoses     ICD-10-CM   1. Cellulitis of upper extremity, unspecified laterality  L03.119   2. Arm swelling  M79.89   3. IVDU (intravenous drug user)  F19.90        Electronically signed by: Librado Puga M.D., 8/10/2022 10:58 PM

## 2022-08-11 NOTE — ED NOTES
Discharge instructions and medications discussed with patient at bedside. All questions answered at this time. VSS. No IV in place at this time. Patient off unit without incident.

## 2022-08-16 LAB
BACTERIA BLD CULT: NORMAL
SIGNIFICANT IND 70042: NORMAL
SITE SITE: NORMAL
SOURCE SOURCE: NORMAL

## 2022-08-28 ENCOUNTER — APPOINTMENT (OUTPATIENT)
Dept: URGENT CARE | Facility: CLINIC | Age: 22
End: 2022-08-28

## 2022-10-29 ENCOUNTER — HOSPITAL ENCOUNTER (EMERGENCY)
Facility: MEDICAL CENTER | Age: 22
End: 2022-10-29
Attending: EMERGENCY MEDICINE
Payer: COMMERCIAL

## 2022-10-29 ENCOUNTER — APPOINTMENT (OUTPATIENT)
Dept: RADIOLOGY | Facility: MEDICAL CENTER | Age: 22
End: 2022-10-29
Attending: EMERGENCY MEDICINE
Payer: COMMERCIAL

## 2022-10-29 VITALS
SYSTOLIC BLOOD PRESSURE: 139 MMHG | HEIGHT: 64 IN | RESPIRATION RATE: 16 BRPM | TEMPERATURE: 97.9 F | WEIGHT: 293 LBS | OXYGEN SATURATION: 96 % | DIASTOLIC BLOOD PRESSURE: 64 MMHG | BODY MASS INDEX: 50.02 KG/M2 | HEART RATE: 86 BPM

## 2022-10-29 DIAGNOSIS — L08.9 SOFT TISSUE INFECTION: Primary | ICD-10-CM

## 2022-10-29 DIAGNOSIS — F19.90 IVDU (INTRAVENOUS DRUG USER): ICD-10-CM

## 2022-10-29 LAB
ALBUMIN SERPL BCP-MCNC: 4 G/DL (ref 3.2–4.9)
ALBUMIN/GLOB SERPL: 1.1 G/DL
ALP SERPL-CCNC: 82 U/L (ref 30–99)
ALT SERPL-CCNC: 30 U/L (ref 2–50)
ANION GAP SERPL CALC-SCNC: 8 MMOL/L (ref 7–16)
AST SERPL-CCNC: 17 U/L (ref 12–45)
BASOPHILS # BLD AUTO: 0.5 % (ref 0–1.8)
BASOPHILS # BLD: 0.03 K/UL (ref 0–0.12)
BILIRUB SERPL-MCNC: 0.5 MG/DL (ref 0.1–1.5)
BUN SERPL-MCNC: 8 MG/DL (ref 8–22)
CALCIUM SERPL-MCNC: 9.4 MG/DL (ref 8.4–10.2)
CHLORIDE SERPL-SCNC: 104 MMOL/L (ref 96–112)
CO2 SERPL-SCNC: 26 MMOL/L (ref 20–33)
COMMENT 1642: NORMAL
CREAT SERPL-MCNC: 0.64 MG/DL (ref 0.5–1.4)
CRP SERPL HS-MCNC: 1.73 MG/DL (ref 0–0.75)
EOSINOPHIL # BLD AUTO: 0.18 K/UL (ref 0–0.51)
EOSINOPHIL NFR BLD: 3 % (ref 0–6.9)
ERYTHROCYTE [DISTWIDTH] IN BLOOD BY AUTOMATED COUNT: 41.7 FL (ref 35.9–50)
ERYTHROCYTE [SEDIMENTATION RATE] IN BLOOD BY WESTERGREN METHOD: 30 MM/HOUR (ref 0–25)
GFR SERPLBLD CREATININE-BSD FMLA CKD-EPI: 127 ML/MIN/1.73 M 2
GLOBULIN SER CALC-MCNC: 3.6 G/DL (ref 1.9–3.5)
GLUCOSE SERPL-MCNC: 126 MG/DL (ref 65–99)
HCG SERPL QL: NEGATIVE
HCT VFR BLD AUTO: 38.7 % (ref 37–47)
HGB BLD-MCNC: 12.3 G/DL (ref 12–16)
IMM GRANULOCYTES # BLD AUTO: 0.03 K/UL (ref 0–0.11)
IMM GRANULOCYTES NFR BLD AUTO: 0.5 % (ref 0–0.9)
LACTATE SERPL-SCNC: 1.3 MMOL/L (ref 0.5–2)
LYMPHOCYTES # BLD AUTO: 2.32 K/UL (ref 1–4.8)
LYMPHOCYTES NFR BLD: 38.8 % (ref 22–41)
MCH RBC QN AUTO: 27.2 PG (ref 27–33)
MCHC RBC AUTO-ENTMCNC: 31.8 G/DL (ref 33.6–35)
MCV RBC AUTO: 85.6 FL (ref 81.4–97.8)
MONOCYTES # BLD AUTO: 0.37 K/UL (ref 0–0.85)
MONOCYTES NFR BLD AUTO: 6.2 % (ref 0–13.4)
NEUTROPHILS # BLD AUTO: 3.05 K/UL (ref 2–7.15)
NEUTROPHILS NFR BLD: 51 % (ref 44–72)
NRBC # BLD AUTO: 0 K/UL
NRBC BLD-RTO: 0 /100 WBC
PLATELET # BLD AUTO: 159 K/UL (ref 164–446)
PMV BLD AUTO: 10.7 FL (ref 9–12.9)
POTASSIUM SERPL-SCNC: 4.1 MMOL/L (ref 3.6–5.5)
PROT SERPL-MCNC: 7.6 G/DL (ref 6–8.2)
RBC # BLD AUTO: 4.52 M/UL (ref 4.2–5.4)
SODIUM SERPL-SCNC: 138 MMOL/L (ref 135–145)
WBC # BLD AUTO: 6 K/UL (ref 4.8–10.8)

## 2022-10-29 PROCEDURE — 73140 X-RAY EXAM OF FINGER(S): CPT | Mod: LT

## 2022-10-29 PROCEDURE — 85025 COMPLETE CBC W/AUTO DIFF WBC: CPT

## 2022-10-29 PROCEDURE — 85652 RBC SED RATE AUTOMATED: CPT

## 2022-10-29 PROCEDURE — 80053 COMPREHEN METABOLIC PANEL: CPT

## 2022-10-29 PROCEDURE — 87040 BLOOD CULTURE FOR BACTERIA: CPT

## 2022-10-29 PROCEDURE — A9270 NON-COVERED ITEM OR SERVICE: HCPCS | Performed by: EMERGENCY MEDICINE

## 2022-10-29 PROCEDURE — 84703 CHORIONIC GONADOTROPIN ASSAY: CPT

## 2022-10-29 PROCEDURE — 86140 C-REACTIVE PROTEIN: CPT

## 2022-10-29 PROCEDURE — 700102 HCHG RX REV CODE 250 W/ 637 OVERRIDE(OP): Performed by: EMERGENCY MEDICINE

## 2022-10-29 PROCEDURE — 99283 EMERGENCY DEPT VISIT LOW MDM: CPT

## 2022-10-29 PROCEDURE — 83605 ASSAY OF LACTIC ACID: CPT

## 2022-10-29 PROCEDURE — 36415 COLL VENOUS BLD VENIPUNCTURE: CPT

## 2022-10-29 RX ORDER — SULFAMETHOXAZOLE AND TRIMETHOPRIM 800; 160 MG/1; MG/1
1 TABLET ORAL 2 TIMES DAILY
Qty: 10 TABLET | Refills: 0 | Status: SHIPPED | OUTPATIENT
Start: 2022-10-29 | End: 2022-10-30

## 2022-10-29 RX ORDER — NYSTATIN 100000 [USP'U]/G
100000 POWDER TOPICAL
Status: SHIPPED | COMMUNITY
Start: 2022-08-06 | End: 2023-01-28

## 2022-10-29 RX ORDER — NALTREXONE HYDROCHLORIDE 50 MG/1
50 TABLET, FILM COATED ORAL
Status: SHIPPED | COMMUNITY
Start: 2022-08-23 | End: 2023-03-15

## 2022-10-29 RX ORDER — SULFAMETHOXAZOLE AND TRIMETHOPRIM 800; 160 MG/1; MG/1
1 TABLET ORAL ONCE
Status: COMPLETED | OUTPATIENT
Start: 2022-10-29 | End: 2022-10-29

## 2022-10-29 RX ADMIN — SULFAMETHOXAZOLE AND TRIMETHOPRIM 1 TABLET: 800; 160 TABLET ORAL at 05:12

## 2022-10-29 ASSESSMENT — FIBROSIS 4 INDEX: FIB4 SCORE: 0.24

## 2022-10-29 ASSESSMENT — PAIN DESCRIPTION - DESCRIPTORS: DESCRIPTORS: TENDER

## 2022-10-29 NOTE — ED PROVIDER NOTES
"ED Provider Note  ED Provider Note    Scribed for No att. providers found by Terrell Steve. 10/29/2022  3:31 AM    Primary care provider: RADHA Medina  Means of arrival: Private vehicle   History obtained from: Patient  History limited by: None    CHIEF COMPLAINT  Chief Complaint   Patient presents with    Digit Pain     L index finger intermittently swelling x 3 days. Noticed pain in her hand tonight, felt a bump under skin and states it feels hard from the web from of her thumb to the proximal portion of her index finger. No known injury, pt does admit to IV drug use. Last use 4-5 days ago. Pt has a sore on R inner FA that has \"pus\" coming from it. Pt also has unexplained bruise to her L FA near a \"missed\" injection site.     Bleeding/Bruising    Skin Lesion     HPI  Mitzi Abel is a 21 y.o. 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  She presents today with likely infection and swelling of her left index finger.  Her last IV drug use was 4 to 5 days ago.  She is planning to go to rehab in a few days.  She states that recently she took a week long course of Keflex that was leftover from prior soft tissue infections as she had a small abscess that was draining pus on her right forearm, and this is resolved well.  She has an IUD in place and does not think she is pregnant.  Denies any fevers, nausea, vomiting, diarrhea, abdominal pain, chest pain, shortness of breath and review of systems otherwise negative.  She has range of motion in her left hand.  She is right-hand dominant.  She noticed some swelling this evening that worsened in the web between the thumb and the index finger on the left hand.  Denies any numbness or tingling in the hand.  It is aching in nature, has been ongoing for " the past day, is moderate in severity and there is no skin changes or signs of trauma or injury.  No associated symptoms.    REVIEW OF SYSTEMS  As above, all other systems reviewed and are negative.   See HPI for further details.     PAST MEDICAL HISTORY   has a past medical history of Anxiety, Asthma, Bipolar 2 disorder, Borderline personality disorder (HCC), Eczema (10/15/2010), H/O: Bell's palsy (12/26/2008), IV drug user, Major depressive disorder, Myopia (10/15/2010), and Obesity.  SURGICAL HISTORY  patient denies any surgical history  SOCIAL HISTORY  Social History     Tobacco Use    Smoking status: Every Day     Packs/day: 1.00     Years: 12.00     Pack years: 12.00     Types: Cigarettes    Smokeless tobacco: Never   Vaping Use    Vaping Use: Never used   Substance Use Topics    Alcohol use: Yes     Comment: A couple of shots every few months.    Drug use: Yes     Types: Intravenous, Injected (Skin Popping)     Comment: meth and heroin      Social History     Substance and Sexual Activity   Drug Use Yes    Types: Intravenous, Injected (Skin Popping)    Comment: meth and heroin     FAMILY HISTORY  Family History   Problem Relation Age of Onset    Diabetes Mother         Gestational DM    Psychiatric Illness Mother         depression    Arthritis Mother     Hypertension Father     Alcohol/Drug Father     Psychiatric Illness Father         bipolar    Thyroid Paternal Grandmother     Diabetes Paternal Grandfather     Heart Disease Paternal Grandfather     Hypertension Paternal Grandfather     Cancer Paternal Grandfather         prostate    Psychiatric Illness Brother         Bipolar/Bipolar    Arthritis Maternal Grandfather      CURRENT MEDICATIONS  Home Medications       Reviewed by Shanti Rea R.N. (Registered Nurse) on 10/29/22 at 0324  Med List Status: Complete     Medication Last Dose Status   naltrexone (DEPADE) 50 MG Tab  Active   nystatin (MYCOSTATIN) powder 10/28/2022 Active   PERSERIS 120 MG  "Prefilled Syringe 10/5/2022 Active                  ALLERGIES  Allergies   Allergen Reactions    Penicillins Anaphylaxis       PHYSICAL EXAM    VITAL SIGNS:   Vitals:    10/29/22 0311 10/29/22 0328   BP:  (!) 147/111   Pulse:  92   Resp:  16   Temp:  36.6 °C (97.9 °F)   TempSrc:  Temporal   SpO2:  93%   Weight: (!) 169 kg (372 lb 12.8 oz)    Height: 1.626 m (5' 4\")      Vitals: My interpretation: HTN, not tachycardic, afebrile, not hypoxic    Reinterpretation of vitals: unchanged     PE:   Constitutional: Well developed, Well nourished, No acute distress, Non-toxic appearance.   HENT: Normocephalic, Atraumatic, Bilateral external ears normal, Oropharynx is clear mucous membranes are moist. No oral exudates or nasal discharge.   Eyes: Pupils are equal round and reactive, EOMI, Conjunctiva normal, No discharge.   Neck: Normal range of motion, No tenderness, Supple, No stridor. No meningismus.  Lymphatic: No lymphadenopathy noted.   Cardiovascular: Regular rate and rhythm without murmur rub or gallop.  Thorax & Lungs: Clear breath sounds bilaterally without wheezes, rhonchi or rales. There is no chest wall tenderness.   Abdomen: Soft non-tender non-distended. There is no rebound or guarding. No organomegaly is appreciated. Bowel sounds are normal.  Skin: Normal without rash.   LUE: Full range of motion of all digits of the left hand.  Neurovascular intact.  No skin color changes, erythema, cellulitic changes.  Normal capillary refill, neurovascular intact.  Mild to moderate tenderness and some mild appreciated swelling in the webbing between the thumb and index finger of the left hand.  Pulses are intact.  Back: No CVA or spinal tenderness.   Extremities: Intact distal pulses, No edema, No tenderness, No cyanosis, No clubbing. Capillary refill is less than 2 seconds.  Musculoskeletal: Good range of motion in all major joints. No tenderness to palpation or major deformities noted.   Neurologic: Alert & oriented x 3, " Normal motor function, Normal sensory function, No focal deficits noted. Reflexes are normal.  Psychiatric: Affect normal, Judgment normal, Mood normal. There is no suicidal ideation or patient reported hallucinations.     DIAGNOSTIC STUDIES / PROCEDURES    LABS  Results for orders placed or performed during the hospital encounter of 10/29/22   CBC WITH DIFFERENTIAL   Result Value Ref Range    WBC 6.0 4.8 - 10.8 K/uL    RBC 4.52 4.20 - 5.40 M/uL    Hemoglobin 12.3 12.0 - 16.0 g/dL    Hematocrit 38.7 37.0 - 47.0 %    MCV 85.6 81.4 - 97.8 fL    MCH 27.2 27.0 - 33.0 pg    MCHC 31.8 (L) 33.6 - 35.0 g/dL    RDW 41.7 35.9 - 50.0 fL    Platelet Count 159 (L) 164 - 446 K/uL    MPV 10.7 9.0 - 12.9 fL    Neutrophils-Polys 51.00 44.00 - 72.00 %    Lymphocytes 38.80 22.00 - 41.00 %    Monocytes 6.20 0.00 - 13.40 %    Eosinophils 3.00 0.00 - 6.90 %    Basophils 0.50 0.00 - 1.80 %    Immature Granulocytes 0.50 0.00 - 0.90 %    Nucleated RBC 0.00 /100 WBC    Neutrophils (Absolute) 3.05 2.00 - 7.15 K/uL    Lymphs (Absolute) 2.32 1.00 - 4.80 K/uL    Monos (Absolute) 0.37 0.00 - 0.85 K/uL    Eos (Absolute) 0.18 0.00 - 0.51 K/uL    Baso (Absolute) 0.03 0.00 - 0.12 K/uL    Immature Granulocytes (abs) 0.03 0.00 - 0.11 K/uL    NRBC (Absolute) 0.00 K/uL   COMP METABOLIC PANEL   Result Value Ref Range    Sodium 138 135 - 145 mmol/L    Potassium 4.1 3.6 - 5.5 mmol/L    Chloride 104 96 - 112 mmol/L    Co2 26 20 - 33 mmol/L    Anion Gap 8.0 7.0 - 16.0    Glucose 126 (H) 65 - 99 mg/dL    Bun 8 8 - 22 mg/dL    Creatinine 0.64 0.50 - 1.40 mg/dL    Calcium 9.4 8.4 - 10.2 mg/dL    AST(SGOT) 17 12 - 45 U/L    ALT(SGPT) 30 2 - 50 U/L    Alkaline Phosphatase 82 30 - 99 U/L    Total Bilirubin 0.5 0.1 - 1.5 mg/dL    Albumin 4.0 3.2 - 4.9 g/dL    Total Protein 7.6 6.0 - 8.2 g/dL    Globulin 3.6 (H) 1.9 - 3.5 g/dL    A-G Ratio 1.1 g/dL   LACTIC ACID   Result Value Ref Range    Lactic Acid 1.3 0.5 - 2.0 mmol/L   CRP QUANTITIVE (NON-CARDIAC)   Result Value  Ref Range    Stat C-Reactive Protein 1.73 (H) 0.00 - 0.75 mg/dL   HCG QUAL SERUM   Result Value Ref Range    Beta-Hcg Qualitative Serum Negative Negative   ESTIMATED GFR   Result Value Ref Range    GFR (CKD-EPI) 127 >60 mL/min/1.73 m 2   DIFFERENTIAL COMMENT   Result Value Ref Range    Comments-Diff see below       All labs reviewed by me. Significant for no leukocytosis, no anemia, normal electrolytes, normal renal function, normal liver enzymes, normal bilirubin, lactic acid normal, C-reactive protein minimally elevated, pregnancy negative    RADIOLOGY  DX-FINGER(S) 2+ LEFT   Final Result         1.  No acute traumatic bony injury        The radiologist's interpretation of all radiological studies have been reviewed by me.    COURSE & MEDICAL DECISION MAKING  Nursing notes, VS, PMSFHx, labs, imaging, EKG reviewed in chart.    MDM: 3:31 AM Mitzi Abel is a 21 y.o. female who presented with known methamphetamine and heroin and fentanyl IV drug use with recurrent soft tissue infections.  Today she is concerned for likely developing infection in the webbing itself that she between her thumb and index finger.  There was some mild swelling she came to the ED.  Denies fevers.  Vital signs unremarkable.  Physical exam shows that there is no significant induration, fluctuance or cellulitic changes, she has full range of motion of the fingers and thumb, and is only moderately tender in the webbing between the thumb and index finger posteriorly.  Bedside ultrasound demonstrates cobblestoning but no signs of loculated fluid collection.  Basic blood work was sent to evaluate for possible systemic signs of infection and demonstrated no significant derangements.  Patient was given a dose of antibiotics here in the emergency department and told to follow-up with her primary care physician.  She is not looking for further help regarding her addiction status as she is going to rehab in a few days she states.  I discussed  return precautions and signs of worsening infection she verbalized understanding and is amenable.    FINAL IMPRESSION  1. Soft tissue infection Acute   2. IVDU (intravenous drug user) Acute      The note accurately reflects work and decisions made by me.  Terrell Steve  10/29/2022  3:31 AM

## 2022-10-29 NOTE — ED NOTES
Patient is stable for discharge at this time, anticipatory guidance provided, close follow-up is encouraged, and ED return instructions have been detailed. Patient is both agreeable to the disposition and plan and discharged home in ambulatory state and in good condition.      Rx education provided, Pt verbalized understanding.

## 2022-10-29 NOTE — DISCHARGE INSTRUCTIONS
Please take the antibiotic as directed.  He can take over-the-counter Tylenol and Motrin up with pain.  Follow-up in 48 to 72 hours a PCP for recheck.  If you have worsening concerns or develop fever, please return the ED.  Thank you for coming in today.

## 2022-10-29 NOTE — ED TRIAGE NOTES
"Chief Complaint   Patient presents with    Digit Pain     L index finger intermittently swelling x 3 days. Noticed pain in her hand tonight, felt a bump under skin and states it feels hard from the web from of her thumb to the proximal portion of her index finger. No known injury, pt does admit to IV drug use. Last use 4-5 days ago. Pt has a sore on R inner FA that has \"pus\" coming from it. Pt also has unexplained bruise to her L FA near a \"missed\" injection site.     Bleeding/Bruising    Skin Lesion       "

## 2022-10-30 ENCOUNTER — HOSPITAL ENCOUNTER (EMERGENCY)
Facility: MEDICAL CENTER | Age: 22
End: 2022-10-30
Attending: EMERGENCY MEDICINE
Payer: COMMERCIAL

## 2022-10-30 ENCOUNTER — APPOINTMENT (OUTPATIENT)
Dept: RADIOLOGY | Facility: MEDICAL CENTER | Age: 22
End: 2022-10-30
Attending: EMERGENCY MEDICINE
Payer: COMMERCIAL

## 2022-10-30 VITALS
SYSTOLIC BLOOD PRESSURE: 136 MMHG | BODY MASS INDEX: 50.02 KG/M2 | HEIGHT: 64 IN | OXYGEN SATURATION: 91 % | WEIGHT: 293 LBS | DIASTOLIC BLOOD PRESSURE: 80 MMHG | TEMPERATURE: 97.5 F | RESPIRATION RATE: 16 BRPM | HEART RATE: 94 BPM

## 2022-10-30 DIAGNOSIS — L03.119 CELLULITIS OF HAND: ICD-10-CM

## 2022-10-30 LAB
ANION GAP SERPL CALC-SCNC: 9 MMOL/L (ref 7–16)
BASOPHILS # BLD AUTO: 0.5 % (ref 0–1.8)
BASOPHILS # BLD: 0.03 K/UL (ref 0–0.12)
BUN SERPL-MCNC: 10 MG/DL (ref 8–22)
CALCIUM SERPL-MCNC: 9.3 MG/DL (ref 8.4–10.2)
CHLORIDE SERPL-SCNC: 102 MMOL/L (ref 96–112)
CO2 SERPL-SCNC: 27 MMOL/L (ref 20–33)
CREAT SERPL-MCNC: 0.71 MG/DL (ref 0.5–1.4)
CRP SERPL HS-MCNC: 1.47 MG/DL (ref 0–0.75)
EOSINOPHIL # BLD AUTO: 0.11 K/UL (ref 0–0.51)
EOSINOPHIL NFR BLD: 1.8 % (ref 0–6.9)
ERYTHROCYTE [DISTWIDTH] IN BLOOD BY AUTOMATED COUNT: 41.1 FL (ref 35.9–50)
ERYTHROCYTE [SEDIMENTATION RATE] IN BLOOD BY WESTERGREN METHOD: 25 MM/HOUR (ref 0–25)
GFR SERPLBLD CREATININE-BSD FMLA CKD-EPI: 122 ML/MIN/1.73 M 2
GLUCOSE SERPL-MCNC: 89 MG/DL (ref 65–99)
HCT VFR BLD AUTO: 39.1 % (ref 37–47)
HGB BLD-MCNC: 12.6 G/DL (ref 12–16)
IMM GRANULOCYTES # BLD AUTO: 0.03 K/UL (ref 0–0.11)
IMM GRANULOCYTES NFR BLD AUTO: 0.5 % (ref 0–0.9)
LYMPHOCYTES # BLD AUTO: 2.18 K/UL (ref 1–4.8)
LYMPHOCYTES NFR BLD: 34.9 % (ref 22–41)
MCH RBC QN AUTO: 27.4 PG (ref 27–33)
MCHC RBC AUTO-ENTMCNC: 32.2 G/DL (ref 33.6–35)
MCV RBC AUTO: 85 FL (ref 81.4–97.8)
MONOCYTES # BLD AUTO: 0.45 K/UL (ref 0–0.85)
MONOCYTES NFR BLD AUTO: 7.2 % (ref 0–13.4)
NEUTROPHILS # BLD AUTO: 3.44 K/UL (ref 2–7.15)
NEUTROPHILS NFR BLD: 55.1 % (ref 44–72)
NRBC # BLD AUTO: 0 K/UL
NRBC BLD-RTO: 0 /100 WBC
PLATELET # BLD AUTO: 187 K/UL (ref 164–446)
PMV BLD AUTO: 9.8 FL (ref 9–12.9)
POTASSIUM SERPL-SCNC: 4 MMOL/L (ref 3.6–5.5)
PROCALCITONIN SERPL-MCNC: 0.02 NG/ML
RBC # BLD AUTO: 4.6 M/UL (ref 4.2–5.4)
SODIUM SERPL-SCNC: 138 MMOL/L (ref 135–145)
WBC # BLD AUTO: 6.2 K/UL (ref 4.8–10.8)

## 2022-10-30 PROCEDURE — 36415 COLL VENOUS BLD VENIPUNCTURE: CPT

## 2022-10-30 PROCEDURE — 99284 EMERGENCY DEPT VISIT MOD MDM: CPT

## 2022-10-30 PROCEDURE — 86140 C-REACTIVE PROTEIN: CPT

## 2022-10-30 PROCEDURE — 76882 US LMTD JT/FCL EVL NVASC XTR: CPT | Mod: LT

## 2022-10-30 PROCEDURE — 85025 COMPLETE CBC W/AUTO DIFF WBC: CPT

## 2022-10-30 PROCEDURE — 87040 BLOOD CULTURE FOR BACTERIA: CPT

## 2022-10-30 PROCEDURE — 96365 THER/PROPH/DIAG IV INF INIT: CPT

## 2022-10-30 PROCEDURE — 85652 RBC SED RATE AUTOMATED: CPT

## 2022-10-30 PROCEDURE — 700101 HCHG RX REV CODE 250: Performed by: EMERGENCY MEDICINE

## 2022-10-30 PROCEDURE — 84145 PROCALCITONIN (PCT): CPT

## 2022-10-30 PROCEDURE — 80048 BASIC METABOLIC PNL TOTAL CA: CPT

## 2022-10-30 RX ORDER — CLINDAMYCIN HYDROCHLORIDE 300 MG/1
300 CAPSULE ORAL 3 TIMES DAILY
Qty: 21 CAPSULE | Refills: 0 | Status: SHIPPED | OUTPATIENT
Start: 2022-10-30 | End: 2022-11-06

## 2022-10-30 RX ORDER — CLINDAMYCIN PHOSPHATE 600 MG/50ML
600 INJECTION, SOLUTION INTRAVENOUS ONCE
Status: COMPLETED | OUTPATIENT
Start: 2022-10-30 | End: 2022-10-30

## 2022-10-30 RX ADMIN — CLINDAMYCIN PHOSPHATE 600 MG: 600 INJECTION, SOLUTION INTRAVENOUS at 18:01

## 2022-10-30 ASSESSMENT — FIBROSIS 4 INDEX: FIB4 SCORE: 0.45

## 2022-10-30 NOTE — ED PROVIDER NOTES
ED Provider Note    CHIEF COMPLAINT  Chief Complaint   Patient presents with    Hand Pain     Here yesterday for infection to L hand. Back today because it is worse.       HPI  Mitzi Abel is a 21 y.o. female who presents with a chief complaint of left hand pain and swelling that has been ongoing for the past 4 days.  She was seen in this emergency department yesterday and given a dose of IV antibiotics.  She was discharged with oral antibiotics and instructed to follow-up as an outpatient.  She was given a prescription for Bactrim and has taken 3 doses but since her visit yesterday the swelling of her hand has worsened.  She has had some tactile fevers but has not measured her temperature.  She admits to using IV drugs and skin popping but has not injected into the area of concern.    REVIEW OF SYSTEMS  See HPI for further details.  Left hand pain and swelling.  All other systems are negative.     PAST MEDICAL HISTORY   has a past medical history of Anxiety, Asthma, Bipolar 2 disorder, Borderline personality disorder (HCC), Eczema (10/15/2010), H/O: Bell's palsy (12/26/2008), IV drug user, Major depressive disorder, Myopia (10/15/2010), and Obesity.    SOCIAL HISTORY  Social History     Tobacco Use    Smoking status: Every Day     Packs/day: 1.00     Years: 12.00     Pack years: 12.00     Types: Cigarettes    Smokeless tobacco: Never   Vaping Use    Vaping Use: Never used   Substance and Sexual Activity    Alcohol use: Yes     Comment: A couple of shots every few months.    Drug use: Yes     Types: Intravenous, Injected (Skin Popping)     Comment: meth and heroin    Sexual activity: Yes     Partners: Male     Comment:        SURGICAL HISTORY  patient denies any surgical history    CURRENT MEDICATIONS  Home Medications    **Home medications have not yet been reviewed for this encounter**         ALLERGIES  Allergies   Allergen Reactions    Penicillins Anaphylaxis       PHYSICAL EXAM  VITAL SIGNS:  "/86   Pulse 95   Resp 18   Ht 1.626 m (5' 4\")   Wt (!) 166 kg (367 lb 1.1 oz)   SpO2 92%   BMI 63.01 kg/m²    Pulse ox interpretation: I interpret this pulse ox as normal.  Constitutional: Alert in no apparent distress.  HENT: No signs of trauma, Bilateral external ears normal, Nose normal.  Moist mucous membranes.  Eyes: Pupils are equal and reactive, Conjunctiva normal, Non-icteric.   Neck: Normal range of motion, No tenderness, Supple, No stridor.   Lymphatic: No lymphadenopathy noted.   Cardiovascular: Regular rate and rhythm, no murmurs. Pulses symmetrical.  Brisk capillary refill in all digits of the left hand.  Thorax & Lungs: Normal breath sounds, No respiratory distress, No wheezing.   Abdomen: Bowel sounds normal, Soft, No tenderness, No masses, No pulsatile masses. No peritoneal signs.  Skin: Warm, Dry, No erythema, No rash.   Back: Normal alignment.  Extremities: Intact distal pulses, No cyanosis.  There is swelling encompassing the first and second digits on the left hand which extends over the dorsum of the left hand and towards the palmar aspect of the left hand as well.  There is a tender deep mass in the webspace between the first and second digits without obvious fluctuance.  Musculoskeletal: No major deformities noted.   Neurologic: Alert, No focal deficits noted.   Psychiatric: Affect normal, Judgment normal, Mood normal.     DIAGNOSTIC STUDIES / PROCEDURES    LABS  Results for orders placed or performed during the hospital encounter of 10/30/22   CBC WITH DIFFERENTIAL   Result Value Ref Range    WBC 6.2 4.8 - 10.8 K/uL    RBC 4.60 4.20 - 5.40 M/uL    Hemoglobin 12.6 12.0 - 16.0 g/dL    Hematocrit 39.1 37.0 - 47.0 %    MCV 85.0 81.4 - 97.8 fL    MCH 27.4 27.0 - 33.0 pg    MCHC 32.2 (L) 33.6 - 35.0 g/dL    RDW 41.1 35.9 - 50.0 fL    Platelet Count 187 164 - 446 K/uL    MPV 9.8 9.0 - 12.9 fL    Neutrophils-Polys 55.10 44.00 - 72.00 %    Lymphocytes 34.90 22.00 - 41.00 %    Monocytes " 7.20 0.00 - 13.40 %    Eosinophils 1.80 0.00 - 6.90 %    Basophils 0.50 0.00 - 1.80 %    Immature Granulocytes 0.50 0.00 - 0.90 %    Nucleated RBC 0.00 /100 WBC    Neutrophils (Absolute) 3.44 2.00 - 7.15 K/uL    Lymphs (Absolute) 2.18 1.00 - 4.80 K/uL    Monos (Absolute) 0.45 0.00 - 0.85 K/uL    Eos (Absolute) 0.11 0.00 - 0.51 K/uL    Baso (Absolute) 0.03 0.00 - 0.12 K/uL    Immature Granulocytes (abs) 0.03 0.00 - 0.11 K/uL    NRBC (Absolute) 0.00 K/uL   Basic Metabolic Panel   Result Value Ref Range    Sodium 138 135 - 145 mmol/L    Potassium 4.0 3.6 - 5.5 mmol/L    Chloride 102 96 - 112 mmol/L    Co2 27 20 - 33 mmol/L    Glucose 89 65 - 99 mg/dL    Bun 10 8 - 22 mg/dL    Creatinine 0.71 0.50 - 1.40 mg/dL    Calcium 9.3 8.4 - 10.2 mg/dL    Anion Gap 9.0 7.0 - 16.0   CRP QUANTITIVE (NON-CARDIAC)   Result Value Ref Range    Stat C-Reactive Protein 1.47 (H) 0.00 - 0.75 mg/dL   Sed Rate   Result Value Ref Range    Sed Rate Westergren 25 0 - 25 mm/hour   PROCALCITONIN   Result Value Ref Range    Procalcitonin 0.02 <0.25 ng/mL   ESTIMATED GFR   Result Value Ref Range    GFR (CKD-EPI) 122 >60 mL/min/1.73 m 2     RADIOLOGY  US-EXTREMITY NON VASCULAR UNILATERAL LEFT   Final Result      Soft tissue edema in the LEFT 1st webspace involving subcutaneous fat and underlying muscle.  No discrete abscess.        COURSE & MEDICAL DECISION MAKING  Pertinent Labs & Imaging studies reviewed. (See chart for details)  This is a 21-year-old female with a history of methamphetamine abuse who is here with worsening swelling in the left hand despite 3 doses of Bactrim.  She does have swelling in the left hand encompassing the first and second digits along with the dorsum of the hand to the wrist but is able to flex and extend the digits as well as the left wrist without any difficulty.  She has no tenderness over the flexor tendon sheath, the fingers not held in passive flexion, there is fusiform edema, but there is no pain with passive  extension of the digit.  This is not flexor tenosynovitis.  There is no significant erythema.  There is no streaking to suggest lymphangitis.  There is no obvious abscess.  Patient was given a dose of clindamycin via IV.  She has no leukocytosis.  Her CRP is slightly elevated and her ESR is at the high end of normal. Procalcitonin is negative. US of the hand reveals soft tissue edema in the left first web space involving subcutaneous fat and underlying muscle without discrete abscess. Patient overall remains very well looking. She is eating and drinking, has normal and stable vital signs, no signs of systemic infection. I think it is reasonable to trial an additional antibiotic and if this is not helpful she is to return for admission and IV antibiotics. She is comfortable with the plan. She will return in 48 hours for wound recheck. Given a prescription for clindamycin and discharged in good and stable condition.    The patient will return for worsening symptoms and is stable at the time of discharge. The patient verbalizes understanding and will comply.    FINAL IMPRESSION  1. Cellulitis of hand  clindamycin (CLEOCIN) 300 MG Cap            Electronically signed by: Joseph Dimas M.D., 10/30/2022 4:56 PM

## 2022-10-31 NOTE — DISCHARGE INSTRUCTIONS
You were seen in the ER for hand swelling. Your labs are reassuring and the ultrasound did not show an abscess. I think it is reasonable to trial a course of a different antibiotic and return here in 24 hours for a recheck. If your symptoms worsen despite trying this new antibiotic you will require hospitalization for IV antibiotics. I have given you the contact information for our on-call orthopedic surgeon, please call his office tomorrow to make a follow up appointment. Return immediately with new or worsening symptoms. Feel better soon!

## 2022-11-03 LAB
BACTERIA BLD CULT: NORMAL
BACTERIA BLD CULT: NORMAL
SIGNIFICANT IND 70042: NORMAL
SIGNIFICANT IND 70042: NORMAL
SITE SITE: NORMAL
SITE SITE: NORMAL
SOURCE SOURCE: NORMAL
SOURCE SOURCE: NORMAL

## 2023-01-16 NOTE — PROGRESS NOTES
Discharge education reviewed, bands verified, cuddles and clamp removed. Pt states she will call RN for car seat check when they are ready for discharge, [place] ARSHAD updated.    Patient calling to check the status of below request. Please call to discuss thank you.

## 2023-01-26 ENCOUNTER — APPOINTMENT (OUTPATIENT)
Dept: RADIOLOGY | Facility: MEDICAL CENTER | Age: 23
End: 2023-01-26
Attending: EMERGENCY MEDICINE
Payer: COMMERCIAL

## 2023-01-26 ENCOUNTER — HOSPITAL ENCOUNTER (OUTPATIENT)
Facility: MEDICAL CENTER | Age: 23
End: 2023-01-27
Attending: EMERGENCY MEDICINE | Admitting: INTERNAL MEDICINE
Payer: COMMERCIAL

## 2023-01-26 DIAGNOSIS — T50.901A ACCIDENTAL DRUG OVERDOSE, INITIAL ENCOUNTER: ICD-10-CM

## 2023-01-26 DIAGNOSIS — R09.02 HYPOXIA: ICD-10-CM

## 2023-01-26 DIAGNOSIS — G93.89 BRAIN MASS: ICD-10-CM

## 2023-01-26 LAB
ALBUMIN SERPL BCP-MCNC: 3.7 G/DL (ref 3.2–4.9)
ALBUMIN/GLOB SERPL: 1 G/DL
ALP SERPL-CCNC: 75 U/L (ref 30–99)
ALT SERPL-CCNC: 69 U/L (ref 2–50)
ANION GAP SERPL CALC-SCNC: 11 MMOL/L (ref 7–16)
AST SERPL-CCNC: 40 U/L (ref 12–45)
BASOPHILS # BLD AUTO: 0.4 % (ref 0–1.8)
BASOPHILS # BLD: 0.03 K/UL (ref 0–0.12)
BILIRUB SERPL-MCNC: 0.5 MG/DL (ref 0.1–1.5)
BUN SERPL-MCNC: 10 MG/DL (ref 8–22)
CALCIUM ALBUM COR SERPL-MCNC: 9.4 MG/DL (ref 8.5–10.5)
CALCIUM SERPL-MCNC: 9.2 MG/DL (ref 8.5–10.5)
CHLORIDE SERPL-SCNC: 103 MMOL/L (ref 96–112)
CO2 SERPL-SCNC: 28 MMOL/L (ref 20–33)
CREAT SERPL-MCNC: 0.76 MG/DL (ref 0.5–1.4)
EOSINOPHIL # BLD AUTO: 0.05 K/UL (ref 0–0.51)
EOSINOPHIL NFR BLD: 0.6 % (ref 0–6.9)
ERYTHROCYTE [DISTWIDTH] IN BLOOD BY AUTOMATED COUNT: 43.9 FL (ref 35.9–50)
GFR SERPLBLD CREATININE-BSD FMLA CKD-EPI: 112 ML/MIN/1.73 M 2
GLOBULIN SER CALC-MCNC: 3.7 G/DL (ref 1.9–3.5)
GLUCOSE SERPL-MCNC: 110 MG/DL (ref 65–99)
HCG SERPL QL: NEGATIVE
HCT VFR BLD AUTO: 37.6 % (ref 37–47)
HGB BLD-MCNC: 11.8 G/DL (ref 12–16)
IMM GRANULOCYTES # BLD AUTO: 0.07 K/UL (ref 0–0.11)
IMM GRANULOCYTES NFR BLD AUTO: 0.8 % (ref 0–0.9)
LYMPHOCYTES # BLD AUTO: 1.27 K/UL (ref 1–4.8)
LYMPHOCYTES NFR BLD: 15.3 % (ref 22–41)
MCH RBC QN AUTO: 27.3 PG (ref 27–33)
MCHC RBC AUTO-ENTMCNC: 31.4 G/DL (ref 33.6–35)
MCV RBC AUTO: 86.8 FL (ref 81.4–97.8)
MONOCYTES # BLD AUTO: 0.7 K/UL (ref 0–0.85)
MONOCYTES NFR BLD AUTO: 8.4 % (ref 0–13.4)
NEUTROPHILS # BLD AUTO: 6.17 K/UL (ref 2–7.15)
NEUTROPHILS NFR BLD: 74.5 % (ref 44–72)
NRBC # BLD AUTO: 0 K/UL
NRBC BLD-RTO: 0 /100 WBC
PLATELET # BLD AUTO: 214 K/UL (ref 164–446)
PMV BLD AUTO: 9.9 FL (ref 9–12.9)
POTASSIUM SERPL-SCNC: 3.9 MMOL/L (ref 3.6–5.5)
PROT SERPL-MCNC: 7.4 G/DL (ref 6–8.2)
RBC # BLD AUTO: 4.33 M/UL (ref 4.2–5.4)
SODIUM SERPL-SCNC: 142 MMOL/L (ref 135–145)
WBC # BLD AUTO: 8.3 K/UL (ref 4.8–10.8)

## 2023-01-26 PROCEDURE — 80307 DRUG TEST PRSMV CHEM ANLYZR: CPT

## 2023-01-26 PROCEDURE — 99285 EMERGENCY DEPT VISIT HI MDM: CPT

## 2023-01-26 PROCEDURE — 80053 COMPREHEN METABOLIC PANEL: CPT

## 2023-01-26 PROCEDURE — 85025 COMPLETE CBC W/AUTO DIFF WBC: CPT

## 2023-01-26 PROCEDURE — 36415 COLL VENOUS BLD VENIPUNCTURE: CPT

## 2023-01-26 PROCEDURE — 84703 CHORIONIC GONADOTROPIN ASSAY: CPT

## 2023-01-26 PROCEDURE — 70450 CT HEAD/BRAIN W/O DYE: CPT

## 2023-01-26 PROCEDURE — 87077 CULTURE AEROBIC IDENTIFY: CPT | Mod: 91

## 2023-01-26 PROCEDURE — 700105 HCHG RX REV CODE 258: Performed by: EMERGENCY MEDICINE

## 2023-01-26 PROCEDURE — 87186 SC STD MICRODIL/AGAR DIL: CPT

## 2023-01-26 PROCEDURE — 700111 HCHG RX REV CODE 636 W/ 250 OVERRIDE (IP): Performed by: EMERGENCY MEDICINE

## 2023-01-26 PROCEDURE — 81001 URINALYSIS AUTO W/SCOPE: CPT | Mod: XU

## 2023-01-26 PROCEDURE — 96374 THER/PROPH/DIAG INJ IV PUSH: CPT

## 2023-01-26 PROCEDURE — 87086 URINE CULTURE/COLONY COUNT: CPT

## 2023-01-26 RX ORDER — SODIUM CHLORIDE 9 MG/ML
1000 INJECTION, SOLUTION INTRAVENOUS ONCE
Status: COMPLETED | OUTPATIENT
Start: 2023-01-26 | End: 2023-01-26

## 2023-01-26 RX ORDER — NALOXONE HYDROCHLORIDE 0.4 MG/ML
0.4 INJECTION, SOLUTION INTRAMUSCULAR; INTRAVENOUS; SUBCUTANEOUS ONCE
Status: COMPLETED | OUTPATIENT
Start: 2023-01-26 | End: 2023-01-26

## 2023-01-26 RX ADMIN — NALOXONE HYDROCHLORIDE 0.4 MG: 0.4 INJECTION, SOLUTION INTRAMUSCULAR; INTRAVENOUS; SUBCUTANEOUS at 23:24

## 2023-01-26 RX ADMIN — SODIUM CHLORIDE 1000 ML: 9 INJECTION, SOLUTION INTRAVENOUS at 17:30

## 2023-01-26 ASSESSMENT — PAIN SCALES - WONG BAKER
WONGBAKER_NUMERICALRESPONSE: DOESN'T HURT AT ALL

## 2023-01-26 ASSESSMENT — FIBROSIS 4 INDEX: FIB4 SCORE: 0.4

## 2023-01-27 VITALS
SYSTOLIC BLOOD PRESSURE: 163 MMHG | HEART RATE: 80 BPM | HEIGHT: 64 IN | TEMPERATURE: 97 F | WEIGHT: 293 LBS | RESPIRATION RATE: 15 BRPM | DIASTOLIC BLOOD PRESSURE: 69 MMHG | BODY MASS INDEX: 50.02 KG/M2 | OXYGEN SATURATION: 93 %

## 2023-01-27 PROBLEM — J96.01 ACUTE RESPIRATORY FAILURE WITH HYPOXIA (HCC): Status: ACTIVE | Noted: 2023-01-27

## 2023-01-27 PROBLEM — T50.901A ACCIDENTAL DRUG OVERDOSE: Status: ACTIVE | Noted: 2023-01-27

## 2023-01-27 PROBLEM — F11.20 OPIATE DEPENDENCE (HCC): Status: ACTIVE | Noted: 2023-01-27

## 2023-01-27 PROBLEM — R90.89 ABNORMAL CT OF BRAIN: Status: ACTIVE | Noted: 2023-01-27

## 2023-01-27 PROBLEM — R09.02 HYPOXIA: Status: ACTIVE | Noted: 2023-01-27

## 2023-01-27 LAB
AMPHET UR QL SCN: POSITIVE
APPEARANCE UR: ABNORMAL
BACTERIA #/AREA URNS HPF: ABNORMAL /HPF
BARBITURATES UR QL SCN: NEGATIVE
BENZODIAZ UR QL SCN: NEGATIVE
BILIRUB UR QL STRIP.AUTO: NEGATIVE
BZE UR QL SCN: NEGATIVE
CANNABINOIDS UR QL SCN: NEGATIVE
COLOR UR: YELLOW
EPI CELLS #/AREA URNS HPF: ABNORMAL /HPF
GLUCOSE UR STRIP.AUTO-MCNC: NEGATIVE MG/DL
HYALINE CASTS #/AREA URNS LPF: ABNORMAL /LPF
KETONES UR STRIP.AUTO-MCNC: NEGATIVE MG/DL
LEUKOCYTE ESTERASE UR QL STRIP.AUTO: ABNORMAL
METHADONE UR QL SCN: NEGATIVE
MICRO URNS: ABNORMAL
NITRITE UR QL STRIP.AUTO: POSITIVE
OPIATES UR QL SCN: NEGATIVE
OXYCODONE UR QL SCN: NEGATIVE
PCP UR QL SCN: NEGATIVE
PH UR STRIP.AUTO: 5.5 [PH] (ref 5–8)
PROPOXYPH UR QL SCN: NEGATIVE
PROT UR QL STRIP: 30 MG/DL
RBC # URNS HPF: ABNORMAL /HPF
RBC UR QL AUTO: ABNORMAL
SP GR UR STRIP.AUTO: 1.01
UROBILINOGEN UR STRIP.AUTO-MCNC: 0.2 MG/DL
WBC #/AREA URNS HPF: ABNORMAL /HPF

## 2023-01-27 PROCEDURE — G0378 HOSPITAL OBSERVATION PER HR: HCPCS

## 2023-01-27 PROCEDURE — 99236 HOSP IP/OBS SAME DATE HI 85: CPT | Performed by: INTERNAL MEDICINE

## 2023-01-27 RX ORDER — ONDANSETRON 4 MG/1
4 TABLET, ORALLY DISINTEGRATING ORAL EVERY 4 HOURS PRN
Status: DISCONTINUED | OUTPATIENT
Start: 2023-01-27 | End: 2023-01-27 | Stop reason: HOSPADM

## 2023-01-27 RX ORDER — LABETALOL HYDROCHLORIDE 5 MG/ML
10 INJECTION, SOLUTION INTRAVENOUS EVERY 4 HOURS PRN
Status: DISCONTINUED | OUTPATIENT
Start: 2023-01-27 | End: 2023-01-27 | Stop reason: HOSPADM

## 2023-01-27 RX ORDER — NALOXONE HYDROCHLORIDE 4 MG/.1ML
1 SPRAY NASAL
Qty: 1 EACH | Refills: 1 | Status: SHIPPED | OUTPATIENT
Start: 2023-01-27 | End: 2023-03-15

## 2023-01-27 RX ORDER — ALBUTEROL SULFATE 90 UG/1
2 AEROSOL, METERED RESPIRATORY (INHALATION)
Status: DISCONTINUED | OUTPATIENT
Start: 2023-01-27 | End: 2023-01-27 | Stop reason: HOSPADM

## 2023-01-27 RX ORDER — ONDANSETRON 2 MG/ML
4 INJECTION INTRAMUSCULAR; INTRAVENOUS EVERY 4 HOURS PRN
Status: DISCONTINUED | OUTPATIENT
Start: 2023-01-27 | End: 2023-01-27 | Stop reason: HOSPADM

## 2023-01-27 RX ORDER — IPRATROPIUM BROMIDE AND ALBUTEROL SULFATE 2.5; .5 MG/3ML; MG/3ML
3 SOLUTION RESPIRATORY (INHALATION)
Status: DISCONTINUED | OUTPATIENT
Start: 2023-01-27 | End: 2023-01-27 | Stop reason: HOSPADM

## 2023-01-27 RX ORDER — ACETAMINOPHEN 325 MG/1
650 TABLET ORAL EVERY 6 HOURS PRN
Status: DISCONTINUED | OUTPATIENT
Start: 2023-01-27 | End: 2023-01-27 | Stop reason: HOSPADM

## 2023-01-27 RX ORDER — POLYETHYLENE GLYCOL 3350 17 G/17G
1 POWDER, FOR SOLUTION ORAL
Status: DISCONTINUED | OUTPATIENT
Start: 2023-01-27 | End: 2023-01-27 | Stop reason: HOSPADM

## 2023-01-27 RX ORDER — NALOXONE HYDROCHLORIDE 0.4 MG/ML
0.4 INJECTION, SOLUTION INTRAMUSCULAR; INTRAVENOUS; SUBCUTANEOUS
Status: DISCONTINUED | OUTPATIENT
Start: 2023-01-27 | End: 2023-01-27 | Stop reason: HOSPADM

## 2023-01-27 RX ORDER — PROMETHAZINE HYDROCHLORIDE 25 MG/1
12.5-25 TABLET ORAL EVERY 4 HOURS PRN
Status: DISCONTINUED | OUTPATIENT
Start: 2023-01-27 | End: 2023-01-27 | Stop reason: HOSPADM

## 2023-01-27 RX ORDER — PROMETHAZINE HYDROCHLORIDE 25 MG/1
12.5-25 SUPPOSITORY RECTAL EVERY 4 HOURS PRN
Status: DISCONTINUED | OUTPATIENT
Start: 2023-01-27 | End: 2023-01-27 | Stop reason: HOSPADM

## 2023-01-27 RX ORDER — ENOXAPARIN SODIUM 100 MG/ML
40 INJECTION SUBCUTANEOUS DAILY
Status: DISCONTINUED | OUTPATIENT
Start: 2023-01-27 | End: 2023-01-27 | Stop reason: HOSPADM

## 2023-01-27 RX ORDER — BISACODYL 10 MG
10 SUPPOSITORY, RECTAL RECTAL
Status: DISCONTINUED | OUTPATIENT
Start: 2023-01-27 | End: 2023-01-27 | Stop reason: HOSPADM

## 2023-01-27 RX ORDER — PROCHLORPERAZINE EDISYLATE 5 MG/ML
5-10 INJECTION INTRAMUSCULAR; INTRAVENOUS EVERY 4 HOURS PRN
Status: DISCONTINUED | OUTPATIENT
Start: 2023-01-27 | End: 2023-01-27 | Stop reason: HOSPADM

## 2023-01-27 RX ORDER — AMOXICILLIN 250 MG
2 CAPSULE ORAL 2 TIMES DAILY
Status: DISCONTINUED | OUTPATIENT
Start: 2023-01-27 | End: 2023-01-27 | Stop reason: HOSPADM

## 2023-01-27 ASSESSMENT — LIFESTYLE VARIABLES
TOTAL SCORE: 0
HOW MANY TIMES IN THE PAST YEAR HAVE YOU HAD 5 OR MORE DRINKS IN A DAY: 0
ALCOHOL_USE: YES
ON A TYPICAL DAY WHEN YOU DRINK ALCOHOL HOW MANY DRINKS DO YOU HAVE: 3
DOES PATIENT WANT TO STOP DRINKING: CANNOT ASSESS
TOTAL SCORE: 0
HAVE YOU EVER FELT YOU SHOULD CUT DOWN ON YOUR DRINKING: NO
AVERAGE NUMBER OF DAYS PER WEEK YOU HAVE A DRINK CONTAINING ALCOHOL: 2
TOTAL SCORE: 0
EVER FELT BAD OR GUILTY ABOUT YOUR DRINKING: NO
HAVE PEOPLE ANNOYED YOU BY CRITICIZING YOUR DRINKING: NO
EVER HAD A DRINK FIRST THING IN THE MORNING TO STEADY YOUR NERVES TO GET RID OF A HANGOVER: NO
CONSUMPTION TOTAL: NEGATIVE

## 2023-01-27 ASSESSMENT — PAIN SCALES - WONG BAKER
WONGBAKER_NUMERICALRESPONSE: DOESN'T HURT AT ALL
WONGBAKER_NUMERICALRESPONSE: DOESN'T HURT AT ALL

## 2023-01-27 ASSESSMENT — COPD QUESTIONNAIRES
DURING THE PAST 4 WEEKS HOW MUCH DID YOU FEEL SHORT OF BREATH: SOME OF THE TIME
COPD SCREENING SCORE: 3
DO YOU EVER COUGH UP ANY MUCUS OR PHLEGM?: NO/ONLY WITH OCCASIONAL COLDS OR INFECTIONS
HAVE YOU SMOKED AT LEAST 100 CIGARETTES IN YOUR ENTIRE LIFE: YES

## 2023-01-27 ASSESSMENT — FIBROSIS 4 INDEX: FIB4 SCORE: 0.54

## 2023-01-27 ASSESSMENT — PATIENT HEALTH QUESTIONNAIRE - PHQ9
SUM OF ALL RESPONSES TO PHQ9 QUESTIONS 1 AND 2: 0
2. FEELING DOWN, DEPRESSED, IRRITABLE, OR HOPELESS: NOT AT ALL
1. LITTLE INTEREST OR PLEASURE IN DOING THINGS: NOT AT ALL

## 2023-01-27 NOTE — DISCHARGE SUMMARY
"Discharge Summary    CHIEF COMPLAINT ON ADMISSION  Chief Complaint   Patient presents with    Drug Overdose     Pt admits to smoking fentanyl today, pt has been clean for 70 days before this relapse. Pt was found by coworkers on the ground unresponsive, bystanders started CPR, when the fire department arrived they found pt with strong pulses and RR of 6/min, [facility] arrived and gave pt 1mg intranasal narcan, pt had positive response, pt arrives VSS on RA, GCS 15. Pt denies SI       Reason for Admission  Fentanyl overdose    Admission Date  1/26/2023    CODE STATUS  Full Code    HPI & HOSPITAL COURSE  Per Dr. Paula's H&P dated 1/27/2023:  \"Mitzi Abel is a 22 y.o. female with past medical history of opiate abuse/dependence, bipolar disorder, asthma, sleep apnea, morbid obesity, who presented 1/26/2023 with accidental fentanyl overdose.  She reportedly was abstinent for 2 months until 2 days ago when she started smoking fentanyl.  She was found by a coworker on the ground unresponsive after she smoked fentanyl, and patient received CPR prior to arrival of EMS.  Per EMS report, her respiratory rate was 6/min, for which was given intranasal Narcan with positive response.  In ER patient required IV Narcan to stay awake.  CT head without contrast showed hypodensity in the wilson of unclear significance, recommended MRI of the brain which was ordered and pending.  Patient is being somnolent, SPO2 dropping to 80s when she is asleep, easily awakened and answers questions appropriately, but falls asleep quickly.\"    Hospital course under my care: afebrile and vitals wnl. Pt's hypoxia resolved in AM during rounds. Exam at bedside was grossly unremarkable. Labs and imagings findings discussed with patient. Due to Pt's body habitus, she will need an outpatient MRI (they have a full schedule today). No focal neuro findings. At this point, I advised an outpatient MRI follow up.     Extensive counseling provided about " substance use. She expresses that she will start going to meeting. Rescue narcan Rx sent to her pharmacy.       Therefore, she is discharged in fair and stable condition to home with close outpatient follow-up.    The patient recovered much more quickly than anticipated on admission.    Discharge Date  01/27/23    FOLLOW UP ITEMS POST DISCHARGE  N/A    DISCHARGE DIAGNOSES  Principal Problem:    Acute respiratory failure with hypoxia (HCC) POA: Yes  Active Problems:    Morbid (severe) obesity due to excess calories (HCC) POA: Yes    Opiate dependence (HCC) POA: Yes    Abnormal CT of brain POA: Yes    Accidental drug overdose POA: Unknown  Resolved Problems:    * No resolved hospital problems. *      FOLLOW UP  Please follow up with your primary care physician within 1 to 2 weeks of discharge. Tele health if available.      MEDICATIONS ON DISCHARGE     Medication List        START taking these medications        Instructions   Naloxone 4 MG/0.1ML Liqd  Commonly known as: Narcan   Administer 4 mg into affected nostril(S) one time as needed (Drug overdose and respiratory depression) for up to 1 dose.  Dose: 1 Spray            CONTINUE taking these medications        Instructions   naltrexone 50 MG Tabs  Commonly known as: DEPADE   Take 50 mg by mouth 1 time a day as needed.  Dose: 50 mg     nystatin powder  Commonly known as: MYCOSTATIN   Apply 100,000 g topically two times a week.  Dose: 100,000 g     Perseris 120 MG Prsy  Generic drug: risperiDONE ER   Inject 120 mg subcutaneously every four weeks. (deliver to [facility])              Allergies  Allergies   Allergen Reactions    Penicillins Anaphylaxis       DIET  Orders Placed This Encounter   Procedures    Diet Order Diet: Regular     Standing Status:   Standing     Number of Occurrences:   1     Order Specific Question:   Diet:     Answer:   Regular [1]       ACTIVITY  As tolerated.  Weight bearing as  tolerated    CONSULTATIONS  N/A    PROCEDURES  N/A    LABORATORY  Lab Results   Component Value Date    SODIUM 142 01/26/2023    POTASSIUM 3.9 01/26/2023    CHLORIDE 103 01/26/2023    CO2 28 01/26/2023    GLUCOSE 110 (H) 01/26/2023    BUN 10 01/26/2023    CREATININE 0.76 01/26/2023    CREATININE 0.6 12/23/2008        Lab Results   Component Value Date    WBC 8.3 01/26/2023    WBC 5.6 03/11/2011    HEMOGLOBIN 11.8 (L) 01/26/2023    HEMATOCRIT 37.6 01/26/2023    PLATELETCT 214 01/26/2023        Total time of the discharge process exceeds 36 minutes.

## 2023-01-27 NOTE — ED NOTES
Pt states she is not able to urinate at this time, pt resting in bed, VSS on 2L NC, GCS 15, NAD, friend at bedside, pt aware POC to finish fluids and wait for results.

## 2023-01-27 NOTE — ED NOTES
The pt transported to floor with RN x2. The pt laying in bed with eyes closed. Breathing is even and unlabored. The pt arousable to voice and becomes oriented. The pt moves extremities, ambulates with a steady gait, talks with clear coherent speech.  The pt maintains on oxygen. Vitals stable on the monitor.

## 2023-01-27 NOTE — PROGRESS NOTES
4 Eyes Skin Assessment Completed by PAVITHRA Townsend and PAVITHRA Dhaliwal.    Head WDL  Ears WDL  Nose WDL  Mouth WDL  Neck WDL  Breast/Chest WDL  Shoulder Blades WDL  Spine WDL  (R) Arm/Elbow/Hand WDL  (L) Arm/Elbow/Hand WDL  Abdomen WDL  Groin WDL  Scrotum/Coccyx/Buttocks WDL  (R) Leg WDL  (L) Leg WDL  (R) Heel/Foot/Toe WDL  (L) Heel/Foot/Toe WDL          Devices In Places Blood Pressure Cuff and Pulse Ox      Interventions In Place N/A    Possible Skin Injury No    Pictures Uploaded Into Epic N/A  Wound Consult Placed N/A  RN Wound Prevention Protocol Ordered No

## 2023-01-27 NOTE — ED NOTES
RN performing MRI checklist. The pt unable to stay awake for questionaire. ERP notified and stated will order narcan

## 2023-01-27 NOTE — ASSESSMENT & PLAN NOTE
secondary to combination of factors, including morbid obesity, sleep apnea, accidental fentanyl overdose  Patient denies suicidal ideations  Plan: Observe with pulse oximetry  Naloxone IV as needed for heart rate 10 and below  CPAP as tolerated  Supplemental oxygen, wean off as able  Albuterol as needed

## 2023-01-27 NOTE — ED PROVIDER NOTES
"ED Provider Note    CHIEF COMPLAINT  Chief Complaint   Patient presents with    Drug Overdose     Pt admits to smoking fentanyl today, pt has been clean for 70 days before this relapse. Pt was found by coworkers on the ground unresponsive, bystanders started CPR, when the fire department arrived they found pt with strong pulses and RR of 6/min, [facility] arrived and gave pt 1mg intranasal narcan, pt had positive response, pt arrives VSS on RA, GCS 15. Pt denies SI       EXTERNAL RECORDS REVIEWED  Inpatient Notes Notes    HPI/ROS  LIMITATION TO HISTORY   Select: : None  OUTSIDE HISTORIAN(S):  Significant other or friend    Mitzi Abel is a 22 y.o. female who presents here for evaluations of accidental overdose after smoking fentanyl.  Patient states that she was smoking fentanyl, and has been clean for 70 days up until a few days ago.  She states that she was found unresponsive by her coworkers, and thinks that she may have struck her head.  The patient has no fever chills, no vomiting, states that she feels fine now, however she is worried because of the \"passing out.'   PAST MEDICAL HISTORY   has a past medical history of Anxiety, Asthma, Bipolar 2 disorder, Borderline personality disorder (HCC), Eczema (10/15/2010), H/O: Bell's palsy (12/26/2008), IV drug user, Major depressive disorder, Myopia (10/15/2010), and Obesity.    SURGICAL HISTORY  patient denies any surgical history    FAMILY HISTORY  Family History   Problem Relation Age of Onset    Diabetes Mother         Gestational DM    Psychiatric Illness Mother         depression    Arthritis Mother     Hypertension Father     Alcohol/Drug Father     Psychiatric Illness Father         bipolar    Thyroid Paternal Grandmother     Diabetes Paternal Grandfather     Heart Disease Paternal Grandfather     Hypertension Paternal Grandfather     Cancer Paternal Grandfather         prostate    Psychiatric Illness Brother         Bipolar/Bipolar    Arthritis Maternal " "Grandfather        SOCIAL HISTORY  Social History     Tobacco Use    Smoking status: Every Day     Packs/day: 1.00     Years: 12.00     Pack years: 12.00     Types: Cigarettes    Smokeless tobacco: Never   Vaping Use    Vaping Use: Never used   Substance and Sexual Activity    Alcohol use: Yes     Comment: A couple of shots every few months.    Drug use: Yes     Types: Intravenous, Injected (Skin Popping)     Comment: meth and heroin    Sexual activity: Yes     Partners: Male     Comment:        CURRENT MEDICATIONS  Home Medications       Reviewed by Edson Elliott R.N. (Registered Nurse) on 01/26/23 at 1637  Med List Status: <None>     Medication Last Dose Status   naltrexone (DEPADE) 50 MG Tab  Active   nystatin (MYCOSTATIN) powder  Active   PERSERIS 120 MG Prefilled Syringe  Active                    ALLERGIES  Allergies   Allergen Reactions    Penicillins Anaphylaxis       PHYSICAL EXAM  VITAL SIGNS: /60   Pulse 81   Temp 36.1 °C (96.9 °F) (Temporal)   Resp 16   Ht 1.626 m (5' 4\")   Wt (!) 163 kg (360 lb)   SpO2 97%   BMI 61.79 kg/m²    Constitutional: Well developed, well nourished. No acute distress.  HEENT: Normocephalic, atraumatic. Posterior pharynx clear and moist.  Eyes:  EOMI. Normal sclera.  Neck: Supple, Full range of motion, nontender.  Chest/Pulmonary: clear to ausculation. Symmetrical expansion.   Cardio: Regular rate and rhythm with no murmur.   Abdomen: Soft, nontender. No peritoneal signs. No guarding. No palpable masses.  Musculoskeletal: No deformity, no edema, neurovascular intact.   Neuro: Clear speech, appropriate, cooperative, cranial nerves II-XII grossly intact.  Psych: Anxious and tearful mood and affect      DIAGNOSTIC STUDIES / PROCEDURES  Results for orders placed or performed during the hospital encounter of 01/26/23   CBC WITH DIFFERENTIAL   Result Value Ref Range    WBC 8.3 4.8 - 10.8 K/uL    RBC 4.33 4.20 - 5.40 M/uL    Hemoglobin 11.8 (L) 12.0 - 16.0 g/dL    " Hematocrit 37.6 37.0 - 47.0 %    MCV 86.8 81.4 - 97.8 fL    MCH 27.3 27.0 - 33.0 pg    MCHC 31.4 (L) 33.6 - 35.0 g/dL    RDW 43.9 35.9 - 50.0 fL    Platelet Count 214 164 - 446 K/uL    MPV 9.9 9.0 - 12.9 fL    Neutrophils-Polys 74.50 (H) 44.00 - 72.00 %    Lymphocytes 15.30 (L) 22.00 - 41.00 %    Monocytes 8.40 0.00 - 13.40 %    Eosinophils 0.60 0.00 - 6.90 %    Basophils 0.40 0.00 - 1.80 %    Immature Granulocytes 0.80 0.00 - 0.90 %    Nucleated RBC 0.00 /100 WBC    Neutrophils (Absolute) 6.17 2.00 - 7.15 K/uL    Lymphs (Absolute) 1.27 1.00 - 4.80 K/uL    Monos (Absolute) 0.70 0.00 - 0.85 K/uL    Eos (Absolute) 0.05 0.00 - 0.51 K/uL    Baso (Absolute) 0.03 0.00 - 0.12 K/uL    Immature Granulocytes (abs) 0.07 0.00 - 0.11 K/uL    NRBC (Absolute) 0.00 K/uL   COMP METABOLIC PANEL   Result Value Ref Range    Sodium 142 135 - 145 mmol/L    Potassium 3.9 3.6 - 5.5 mmol/L    Chloride 103 96 - 112 mmol/L    Co2 28 20 - 33 mmol/L    Anion Gap 11.0 7.0 - 16.0    Glucose 110 (H) 65 - 99 mg/dL    Bun 10 8 - 22 mg/dL    Creatinine 0.76 0.50 - 1.40 mg/dL    Calcium 9.2 8.5 - 10.5 mg/dL    AST(SGOT) 40 12 - 45 U/L    ALT(SGPT) 69 (H) 2 - 50 U/L    Alkaline Phosphatase 75 30 - 99 U/L    Total Bilirubin 0.5 0.1 - 1.5 mg/dL    Albumin 3.7 3.2 - 4.9 g/dL    Total Protein 7.4 6.0 - 8.2 g/dL    Globulin 3.7 (H) 1.9 - 3.5 g/dL    A-G Ratio 1.0 g/dL   BETA-HCG QUALITATIVE SERUM   Result Value Ref Range    Beta-Hcg Qualitative Serum Negative Negative   CORRECTED CALCIUM   Result Value Ref Range    Correct Calcium 9.4 8.5 - 10.5 mg/dL   ESTIMATED GFR   Result Value Ref Range    GFR (CKD-EPI) 112 >60 mL/min/1.73 m 2         RADIOLOGY  I have independently interpreted the diagnostic imaging associated with this visit and am waiting the final reading from the radiologist.   My preliminary interpretation is a follows:     CT-HEAD W/O   Final Result      1.  Hyperdensity of the wilson of uncertain clinical significance, which may be artifactual  "related to patient jewelry. If there is clinical concern for acute process, MRI can be obtained.   2.  No other evidence of acute intracranial abnormality.               COURSE & MEDICAL DECISION MAKING      INITIAL ASSESSMENT AND PLAN  Care Narrative: This is a 18-year-old female who was smoking fentanyl throughout the day.  She states that while smoking fentanyl, states that she \"got high\" and woke up on the ground.  It is unknown if she passed out, or if she was too high, and sat herself down.  The patient has no recollection of the event.  Her CT scan shows no acute finding, however there is an area in the wislon that was noted to be of uncertain clinical significance.  The patient herself has no focal neurodeficits, and takes no anticoagulants.  I did speak with radiology via volt, and he states is likely not blood, but its hard to be completely sure.      11:57 PM  The pt had to be given narcan here, and her sats drop to 86% when not on oxygen.  She will need to be admitted.   Pt will be admitted to Dr. ALVAREZ      I have placed patient in observation at 11 PM, on 1/26/2023.    ADDITIONAL PROBLEM LIST AND DISPOSITION    I have discussed management of the patient with the following physicians and RAKESH's:      Discussion of management with other [facility] or appropriate source(s): Radiologist I spoke to Dio Sumner for radiology.  He states that the wilson lesion is most likely not blood, but she has a necklace on, so it can be difficult to tell.              FINAL DIAGNOSIS  Fentanyl abuse           Electronically signed by: Domenico Toney D.O., 1/26/2023 6:06 PM      "

## 2023-01-27 NOTE — ED TRIAGE NOTES
"Chief Complaint   Patient presents with    Drug Overdose     Pt admits to smoking fentanyl today, pt has been clean for 70 days before this relapse. Pt was found by coworkers on the ground unresponsive, bystanders started CPR, when the fire department arrived they found pt with strong pulses and RR of 6/min, [facility] arrived and gave pt 1mg intranasal narcan, pt had positive response, pt arrives VSS on RA, GCS 15. Pt denies SI     Pt BIB EMS for above complaints, VSS on RA, GCS 15, NAD, .    Pt does c/o chest tenderness s/p CPR.    BP (!) 141/64   Pulse 86   Temp 36.1 °C (96.9 °F) (Temporal)   Resp 16   Ht 1.626 m (5' 4\")   Wt (!) 163 kg (360 lb)   SpO2 94%   BMI 61.79 kg/m²     "

## 2023-01-27 NOTE — H&P
Hospital Medicine History & Physical Note    Date of Service  1/27/2023    Primary Care Physician  RADHA Medina        Code Status  Full Code    Chief Complaint  Chief Complaint   Patient presents with    Drug Overdose     Pt admits to smoking fentanyl today, pt has been clean for 70 days before this relapse. Pt was found by coworkers on the ground unresponsive, bystanders started CPR, when the fire department arrived they found pt with strong pulses and RR of 6/min, [facility] arrived and gave pt 1mg intranasal narcan, pt had positive response, pt arrives VSS on RA, GCS 15. Pt denies SI       History of Presenting Illness  Mitzi Abel is a 22 y.o. female with past medical history of opiate abuse/dependence, bipolar disorder, asthma, sleep apnea, morbid obesity, who presented 1/26/2023 with accidental fentanyl overdose.  She reportedly was abstinent for 2 months until 2 days ago when she started smoking fentanyl.  She was found by a coworker on the ground unresponsive after she smoked fentanyl, and patient received CPR prior to arrival of EMS.  Per EMS report, her respiratory rate was 6/min, for which was given intranasal Narcan with positive response.  In ER patient required IV Narcan to stay awake.  CT head without contrast showed hypodensity in the wilson of unclear significance, recommended MRI of the brain which was ordered and pending.  Patient is being somnolent, SPO2 dropping to 80s when she is asleep, easily awakened and answers questions appropriately, but falls asleep quickly..    I discussed the plan of care with patient.    Review of Systems  Review of Systems   Unable to perform ROS: Other   Somnolent    Past Medical History   has a past medical history of Anxiety, Asthma, Bipolar 2 disorder, Borderline personality disorder (HCC), Eczema (10/15/2010), H/O: Bell's palsy (12/26/2008), IV drug user, Major depressive disorder, Myopia (10/15/2010), and Obesity.    Surgical History   has no  past surgical history on file.     Family History  family history includes Alcohol/Drug in her father; Arthritis in her maternal grandfather and mother; Cancer in her paternal grandfather; Diabetes in her mother and paternal grandfather; Heart Disease in her paternal grandfather; Hypertension in her father and paternal grandfather; Psychiatric Illness in her brother, father, and mother; Thyroid in her paternal grandmother.   Family history reviewed with patient. There is no family history that is pertinent to the chief complaint.     Social History   reports that she has been smoking cigarettes. She has a 12.00 pack-year smoking history. She has never used smokeless tobacco. She reports current alcohol use. She reports current drug use. Drugs: Intravenous and Injected (Skin Popping).    Allergies  Allergies   Allergen Reactions    Penicillins Anaphylaxis       Medications  Prior to Admission Medications   Prescriptions Last Dose Informant Patient Reported? Taking?   PERSERIS 120 MG Prefilled Syringe   Yes No   Sig: Inject 120 mg subcutaneously every four weeks. (deliver to [facility])   naltrexone (DEPADE) 50 MG Tab   Yes No   Sig: Take 50 mg by mouth 1 time a day as needed.   nystatin (MYCOSTATIN) powder   Yes No   Sig: Apply 100,000 g topically two times a week.      Facility-Administered Medications: None       Physical Exam  Temp:  [36.1 °C (96.9 °F)] 36.1 °C (96.9 °F)  Pulse:  [73-94] 86  Resp:  [16] 16  BP: (104-141)/(52-75) 113/75  SpO2:  [72 %-97 %] 93 %  Blood Pressure: 113/75   Temperature: 36.1 °C (96.9 °F)   Pulse: 86   Respiration: 16   Pulse Oximetry: 93 %       Physical Exam  Vitals and nursing note reviewed.   Constitutional:       General: She is not in acute distress.     Appearance: Normal appearance. She is obese.   HENT:      Head: Normocephalic and atraumatic.      Nose: Nose normal.      Mouth/Throat:      Mouth: Mucous membranes are moist.   Eyes:      Extraocular Movements: Extraocular  movements intact.      Pupils: Pupils are equal, round, and reactive to light.   Cardiovascular:      Rate and Rhythm: Normal rate and regular rhythm.   Pulmonary:      Effort: Pulmonary effort is normal.      Breath sounds: Normal breath sounds.   Abdominal:      General: Abdomen is flat. There is no distension.      Tenderness: There is no abdominal tenderness.   Musculoskeletal:         General: No swelling or deformity. Normal range of motion.      Cervical back: Normal range of motion and neck supple.   Skin:     General: Skin is warm and dry.   Neurological:      General: No focal deficit present.      Comments: Somnolent   Psychiatric:         Mood and Affect: Mood normal.         Behavior: Behavior normal.       Laboratory:  Recent Labs     01/26/23  1737   WBC 8.3   RBC 4.33   HEMOGLOBIN 11.8*   HEMATOCRIT 37.6   MCV 86.8   MCH 27.3   MCHC 31.4*   RDW 43.9   PLATELETCT 214   MPV 9.9     Recent Labs     01/26/23  1737   SODIUM 142   POTASSIUM 3.9   CHLORIDE 103   CO2 28   GLUCOSE 110*   BUN 10   CREATININE 0.76   CALCIUM 9.2     Recent Labs     01/26/23  1737   ALTSGPT 69*   ASTSGOT 40   ALKPHOSPHAT 75   TBILIRUBIN 0.5   GLUCOSE 110*         No results for input(s): NTPROBNP in the last 72 hours.      No results for input(s): TROPONINT in the last 72 hours.    Imaging:  CT-HEAD W/O   Final Result      1.  Hyperdensity of the wilson of uncertain clinical significance, which may be artifactual related to patient jewelry. If there is clinical concern for acute process, MRI can be obtained.   2.  No other evidence of acute intracranial abnormality.         MR-BRAIN-W/O    (Results Pending)         Assessment/Plan:  Justification for Admission Status  I anticipate this patient is appropriate for observation status at this time because hypoxia due to drug overdose    Patient will need a Med/Surg bed on MEDICAL service .  The need is secondary to hypoxia due to opioid overdose.    * Hypoxia- (present on  admission)  Accidental fentanyl overdose  Assessment & Plan  secondary to combination of factors, including morbid obesity, sleep apnea, accidental fentanyl overdose  Patient denies suicidal ideations  Plan: Observe with pulse oximetry  Naloxone IV as needed for respiratory rate 10 and below  CPAP as tolerated  Supplemental oxygen, wean off as able  Albuterol as needed    Abnormal CT of brain  Assessment & Plan  CT of the head showed hypodensity in the wilson  Follow with MRI of the brain which was ordered    Opiate dependence (HCC)  Assessment & Plan  Illicit drug counseling    Morbid (severe) obesity due to excess calories (HCC)- (present on admission)  Assessment & Plan  BMI 63  Follow-up with PCP for outpatient management        VTE prophylaxis: enoxaparin ppx

## 2023-01-27 NOTE — ED NOTES
The pt is laying in bed with eyes closed. Breathing is even and unlabored. RN notes that oxygen is off, spo2 is decreased. RN reapplies oxygen. The pt arousable to voice. spo2 then increases. No pain noted. Other vitals stable

## 2023-01-27 NOTE — ED NOTES
The pt ambulatory with a steady gait to the restroom. No indicators of pain. The pt unable to go pee at this time   Dermal Autograft Text: The defect edges were debeveled with a #15 scalpel blade.  Given the location of the defect, shape of the defect and the proximity to free margins a dermal autograft was deemed most appropriate.  Using a sterile surgical marker, the primary defect shape was transferred to the donor site. The area thus outlined was incised deep to adipose tissue with a #15 scalpel blade.  The harvested graft was then trimmed of adipose and epidermal tissue until only dermis was left.  The skin graft was then placed in the primary defect and oriented appropriately.

## 2023-01-27 NOTE — ED NOTES
The pt is laying in bed with eyes closed. Breathing is even and unlabored. Vitals stable on the monitor and on oxygen

## 2023-01-27 NOTE — ED NOTES
The pt alert and oriented sitting in bed. The pt talking appropriately. The pt states that she is cold. RN provides blankets.

## 2023-01-27 NOTE — CARE PLAN
The patient is Stable - Low risk of patient condition declining or worsening    Shift Goals  Clinical Goals: Monitor post-overdose/respiratory failure  Patient Goals: Rest  Family Goals: ALE      Problem: Respiratory  Goal: Patient will achieve/maintain optimum respiratory ventilation and gas exchange  Outcome: Progressing

## 2023-01-28 ENCOUNTER — OFFICE VISIT (OUTPATIENT)
Dept: URGENT CARE | Facility: CLINIC | Age: 23
End: 2023-01-28
Payer: COMMERCIAL

## 2023-01-28 VITALS
OXYGEN SATURATION: 97 % | BODY MASS INDEX: 50.02 KG/M2 | TEMPERATURE: 98.4 F | HEART RATE: 104 BPM | SYSTOLIC BLOOD PRESSURE: 162 MMHG | DIASTOLIC BLOOD PRESSURE: 90 MMHG | RESPIRATION RATE: 20 BRPM | HEIGHT: 64 IN | WEIGHT: 293 LBS

## 2023-01-28 DIAGNOSIS — R10.9 LEFT FLANK PAIN: ICD-10-CM

## 2023-01-28 DIAGNOSIS — R03.0 ELEVATED BLOOD PRESSURE READING: ICD-10-CM

## 2023-01-28 DIAGNOSIS — N30.01 ACUTE CYSTITIS WITH HEMATURIA: ICD-10-CM

## 2023-01-28 LAB
APPEARANCE UR: ABNORMAL
BILIRUB UR STRIP-MCNC: ABNORMAL MG/DL
COLOR UR AUTO: ABNORMAL
GLUCOSE UR STRIP.AUTO-MCNC: ABNORMAL MG/DL
INT CON NEG: NORMAL
INT CON POS: NORMAL
KETONES UR STRIP.AUTO-MCNC: ABNORMAL MG/DL
LEUKOCYTE ESTERASE UR QL STRIP.AUTO: ABNORMAL
NITRITE UR QL STRIP.AUTO: ABNORMAL
PH UR STRIP.AUTO: 8.5 [PH] (ref 5–8)
POC URINE PREGNANCY TEST: NEGATIVE
PROT UR QL STRIP: 30 MG/DL
RBC UR QL AUTO: ABNORMAL
SP GR UR STRIP.AUTO: 1.01
UROBILINOGEN UR STRIP-MCNC: 0.2 MG/DL

## 2023-01-28 PROCEDURE — 81025 URINE PREGNANCY TEST: CPT | Performed by: NURSE PRACTITIONER

## 2023-01-28 PROCEDURE — 81002 URINALYSIS NONAUTO W/O SCOPE: CPT | Performed by: NURSE PRACTITIONER

## 2023-01-28 PROCEDURE — 99214 OFFICE O/P EST MOD 30 MIN: CPT | Performed by: NURSE PRACTITIONER

## 2023-01-28 RX ORDER — BUSPIRONE HYDROCHLORIDE 10 MG/1
10 TABLET ORAL 2 TIMES DAILY
COMMUNITY
Start: 2022-11-22 | End: 2023-03-03

## 2023-01-28 RX ORDER — SULFAMETHOXAZOLE AND TRIMETHOPRIM 800; 160 MG/1; MG/1
1 TABLET ORAL EVERY 12 HOURS
Qty: 14 TABLET | Refills: 0 | Status: SHIPPED | OUTPATIENT
Start: 2023-01-28 | End: 2023-02-04

## 2023-01-28 RX ORDER — IBUPROFEN 600 MG/1
600 TABLET ORAL EVERY 6 HOURS PRN
Qty: 30 TABLET | Refills: 0 | Status: SHIPPED | OUTPATIENT
Start: 2023-01-28 | End: 2023-03-15

## 2023-01-28 RX ORDER — PHENAZOPYRIDINE HYDROCHLORIDE 200 MG/1
200 TABLET, FILM COATED ORAL 3 TIMES DAILY
Qty: 6 TABLET | Refills: 0 | Status: SHIPPED | OUTPATIENT
Start: 2023-01-28 | End: 2023-01-30

## 2023-01-28 RX ORDER — KETOROLAC TROMETHAMINE 30 MG/ML
30 INJECTION, SOLUTION INTRAMUSCULAR; INTRAVENOUS ONCE
Status: COMPLETED | OUTPATIENT
Start: 2023-01-28 | End: 2023-01-28

## 2023-01-28 RX ADMIN — KETOROLAC TROMETHAMINE 30 MG: 30 INJECTION, SOLUTION INTRAMUSCULAR; INTRAVENOUS at 14:34

## 2023-01-28 ASSESSMENT — ENCOUNTER SYMPTOMS
BACK PAIN: 1
FEVER: 0
ABDOMINAL PAIN: 1
FLANK PAIN: 1
VOMITING: 0
NAUSEA: 1

## 2023-01-28 ASSESSMENT — FIBROSIS 4 INDEX: FIB4 SCORE: 0.54

## 2023-01-28 NOTE — PATIENT INSTRUCTIONS
-Oral hydration.  -Can take OTC Ibuprofen or Tylenol as directed for pain.   -Temperature Therapy: Heat or ice, whichever feels better.    Follow up for persistent, new, or worsening pain, abdominal pain, difficulty with urination, persistent blood in urine, fevers, vomiting, weakness, tachycardia, or any other concerns.

## 2023-01-28 NOTE — PROGRESS NOTES
Subjective:     Mitzi Abel is a 22 y.o. female who presents for Flank Pain (X4 days, lower back pain ) and Dysuria (Burning/painful with urination )      Had UTI symptoms in the ED. Pain to LLQ and left flank. Denies having a catheter. Unknown LMP, mirena. Blood with wiping. No hx of renal calculi. No hx of flank pain with previous UTIs.     Dysuria   This is a new problem. The current episode started in the past 7 days. The pain is at a severity of 4/10. There is No history of pyelonephritis. Associated symptoms include flank pain, hematuria, nausea and urgency. Pertinent negatives include no vomiting.     Past Medical History:   Diagnosis Date    Anxiety     Asthma     Bipolar 2 disorder     Borderline personality disorder (HCC)     Eczema 10/15/2010    H/O: Bell's palsy 12/26/2008    IV drug user     Major depressive disorder     Myopia 10/15/2010    Obesity        No past surgical history on file.    Social History     Socioeconomic History    Marital status:      Spouse name: Not on file    Number of children: Not on file    Years of education: Not on file    Highest education level: Not on file   Occupational History    Not on file   Tobacco Use    Smoking status: Every Day     Packs/day: 1.00     Years: 12.00     Pack years: 12.00     Types: Cigarettes    Smokeless tobacco: Never   Vaping Use    Vaping Use: Never used   Substance and Sexual Activity    Alcohol use: Yes     Comment: A couple of shots every few months.    Drug use: Yes     Types: Intravenous, Injected (Skin Popping)     Comment: meth and heroin    Sexual activity: Yes     Partners: Male     Comment:    Other Topics Concern    Behavioral problems Not Asked    Interpersonal relationships Not Asked    Sad or not enjoying activities Not Asked    Suicidal thoughts Not Asked    Poor school performance Not Asked    Reading difficulties Not Asked    Speech difficulties Not Asked    Writing difficulties Not Asked     "Inadequate sleep Not Asked    Excessive TV viewing Not Asked    Excessive video game use Not Asked    Inadequate exercise Not Asked    Sports related Not Asked    Poor diet Not Asked    Family concerns for drug/alcohol abuse Not Asked    Poor oral hygiene Not Asked    Bike safety Not Asked    Family concerns vehicle safety Not Asked   Social History Narrative    Not on file     Social Determinants of Health     Financial Resource Strain: Not on file   Food Insecurity: Not on file   Transportation Needs: Not on file   Physical Activity: Not on file   Stress: Not on file   Social Connections: Not on file   Intimate Partner Violence: Not on file   Housing Stability: Not on file        Family History   Problem Relation Age of Onset    Diabetes Mother         Gestational DM    Psychiatric Illness Mother         depression    Arthritis Mother     Hypertension Father     Alcohol/Drug Father     Psychiatric Illness Father         bipolar    Thyroid Paternal Grandmother     Diabetes Paternal Grandfather     Heart Disease Paternal Grandfather     Hypertension Paternal Grandfather     Cancer Paternal Grandfather         prostate    Psychiatric Illness Brother         Bipolar/Bipolar    Arthritis Maternal Grandfather         Allergies   Allergen Reactions    Penicillins Anaphylaxis       Review of Systems   Constitutional:  Negative for fever.   Gastrointestinal:  Positive for abdominal pain and nausea. Negative for vomiting.   Genitourinary:  Positive for dysuria, flank pain, hematuria and urgency.   Musculoskeletal:  Positive for back pain.   All other systems reviewed and are negative.     Objective:   BP (!) 162/90   Pulse (!) 104   Temp 36.9 °C (98.4 °F) (Temporal)   Resp 20   Ht 1.626 m (5' 4\")   Wt (!) 167 kg (369 lb)   SpO2 97%   BMI 63.34 kg/m²     Physical Exam  Vitals reviewed.   Constitutional:       Appearance: She is not toxic-appearing.   Pulmonary:      Effort: Pulmonary effort is normal. No respiratory " distress.   Abdominal:      General: Abdomen is protuberant. Bowel sounds are normal. There is no distension.      Palpations: Abdomen is soft.      Tenderness: There is no abdominal tenderness. There is no right CVA tenderness, left CVA tenderness or guarding.   Skin:     General: Skin is warm and dry.   Neurological:      Mental Status: She is alert and oriented to person, place, and time.   Psychiatric:         Mood and Affect: Mood normal.         Behavior: Behavior normal.       Assessment/Plan:   1. Acute cystitis with hematuria  - POCT Urinalysis  - POCT Pregnancy  - CT-RENAL COLIC EVALUATION(A/P W/O); Future  - sulfamethoxazole-trimethoprim (BACTRIM DS) 800-160 MG tablet; Take 1 Tablet by mouth every 12 hours for 7 days.  Dispense: 14 Tablet; Refill: 0  - phenazopyridine (PYRIDIUM) 200 MG Tab; Take 1 Tablet by mouth 3 times a day for 2 days.  Dispense: 6 Tablet; Refill: 0    2. Left flank pain  - CT-RENAL COLIC EVALUATION(A/P W/O); Future  - ketorolac (TORADOL) injection 30 mg  - sulfamethoxazole-trimethoprim (BACTRIM DS) 800-160 MG tablet; Take 1 Tablet by mouth every 12 hours for 7 days.  Dispense: 14 Tablet; Refill: 0  - ibuprofen (MOTRIN) 600 MG Tab; Take 1 Tablet by mouth every 6 hours as needed for Inflammation or Moderate Pain.  Dispense: 30 Tablet; Refill: 0    3. Elevated blood pressure reading    Other orders  - busPIRone (BUSPAR) 10 MG Tab tablet; Take 10 mg by mouth 2 times a day.    Results for orders placed or performed in visit on 01/28/23   POCT Urinalysis   Result Value Ref Range    POC Color Dark Yellow Negative    POC Appearance Slightly Cloudy Negative    POC Leukocyte Esterase Mod Negative    POC Nitrites Neg Negative    POC Urobiligen 0.2 Negative (0.2) mg/dL    POC Protein 30 Negative mg/dL    POC Urine PH 8.5 (A) 5.0 - 8.0    POC Blood Mod Negative    POC Specific Gravity 1.015 <1.005 - >1.030    POC Ketones Neg Negative mg/dL    POC Bilirubin Neg Negative mg/dL    POC Glucose Neg  Negative mg/dL   POCT Pregnancy   Result Value Ref Range    POC Urine Pregnancy Test Negative Negative    Internal Control Positive Valid     Internal Control Negative Valid    -Oral hydration.  -Can take OTC Ibuprofen or Tylenol as directed for pain.   -Temperature Therapy: Heat or ice, whichever feels better.    Follow up for persistent, new, or worsening pain, abdominal pain, difficulty with urination, persistent blood in urine, fevers, vomiting, weakness, tachycardia, or any other concerns.    -Afebrile. No CVA or abdominal tenderness to palpation.  Urine culture pending through the ED. Unable to schedule out patient imaging to r/o renal calculi. BUN and creatinine were WNL 2 days ago. Given ED precautions for persistent or worsening flank or abdominal pain. Discussed monitoring BP, with PCP f/u.     Differential diagnosis, natural history, supportive care, and indications for immediate follow-up discussed.

## 2023-01-29 LAB
BACTERIA UR CULT: ABNORMAL
SIGNIFICANT IND 70042: ABNORMAL
SITE SITE: ABNORMAL
SOURCE SOURCE: ABNORMAL

## 2023-03-03 ENCOUNTER — OFFICE VISIT (OUTPATIENT)
Dept: URGENT CARE | Facility: CLINIC | Age: 23
End: 2023-03-03
Payer: COMMERCIAL

## 2023-03-03 VITALS
RESPIRATION RATE: 20 BRPM | WEIGHT: 293 LBS | BODY MASS INDEX: 50.02 KG/M2 | TEMPERATURE: 98.4 F | OXYGEN SATURATION: 95 % | DIASTOLIC BLOOD PRESSURE: 72 MMHG | HEART RATE: 106 BPM | HEIGHT: 64 IN | SYSTOLIC BLOOD PRESSURE: 124 MMHG

## 2023-03-03 DIAGNOSIS — N30.01 ACUTE CYSTITIS WITH HEMATURIA: ICD-10-CM

## 2023-03-03 DIAGNOSIS — R30.0 DYSURIA: ICD-10-CM

## 2023-03-03 LAB
APPEARANCE UR: NORMAL
BILIRUB UR STRIP-MCNC: NORMAL MG/DL
COLOR UR AUTO: NORMAL
GLUCOSE UR STRIP.AUTO-MCNC: NORMAL MG/DL
KETONES UR STRIP.AUTO-MCNC: NORMAL MG/DL
LEUKOCYTE ESTERASE UR QL STRIP.AUTO: NORMAL
NITRITE UR QL STRIP.AUTO: NORMAL
PH UR STRIP.AUTO: 6 [PH] (ref 5–8)
POCT INT CON NEG: NEGATIVE
POCT INT CON POS: POSITIVE
POCT URINE PREGNANCY TEST: NEGATIVE
PROT UR QL STRIP: NORMAL MG/DL
RBC UR QL AUTO: NORMAL
SP GR UR STRIP.AUTO: 1.02
UROBILINOGEN UR STRIP-MCNC: 1 MG/DL

## 2023-03-03 PROCEDURE — 99213 OFFICE O/P EST LOW 20 MIN: CPT | Performed by: FAMILY MEDICINE

## 2023-03-03 PROCEDURE — 81025 URINE PREGNANCY TEST: CPT | Performed by: FAMILY MEDICINE

## 2023-03-03 PROCEDURE — 81002 URINALYSIS NONAUTO W/O SCOPE: CPT | Performed by: FAMILY MEDICINE

## 2023-03-03 RX ORDER — PHENAZOPYRIDINE HYDROCHLORIDE 200 MG/1
200 TABLET, FILM COATED ORAL 3 TIMES DAILY PRN
Qty: 6 TABLET | Refills: 0 | Status: SHIPPED | OUTPATIENT
Start: 2023-03-03 | End: 2023-03-15

## 2023-03-03 RX ORDER — CIPROFLOXACIN 250 MG/1
250 TABLET, FILM COATED ORAL 2 TIMES DAILY
Qty: 20 TABLET | Refills: 0 | Status: SHIPPED | OUTPATIENT
Start: 2023-03-03 | End: 2023-03-11

## 2023-03-03 ASSESSMENT — ENCOUNTER SYMPTOMS
CONSTITUTIONAL NEGATIVE: 1
RESPIRATORY NEGATIVE: 1
CARDIOVASCULAR NEGATIVE: 1

## 2023-03-03 ASSESSMENT — FIBROSIS 4 INDEX: FIB4 SCORE: 0.54

## 2023-03-03 NOTE — PROGRESS NOTES
"Subjective:   Mitzi Abel is a 22 y.o. female who presents for Blood in Urine (X4 days, foul urine odor, dark urine )      HPI    Review of Systems   Constitutional: Negative.    Respiratory: Negative.     Cardiovascular: Negative.    Genitourinary:  Positive for dysuria, frequency, hematuria and urgency.     Medications, Allergies, and current problem list reviewed today in Epic.     Objective:     /72   Pulse (!) 106   Temp 36.9 °C (98.4 °F) (Temporal)   Resp 20   Ht 1.626 m (5' 4\")   Wt (!) 154 kg (340 lb)   SpO2 95%     Physical Exam  Vitals and nursing note reviewed.   Constitutional:       Appearance: Normal appearance.   HENT:      Head: Normocephalic and atraumatic.   Cardiovascular:      Rate and Rhythm: Regular rhythm. Tachycardia present.      Pulses: Normal pulses.      Heart sounds: Normal heart sounds.   Pulmonary:      Effort: Pulmonary effort is normal.      Breath sounds: Normal breath sounds.   Abdominal:      General: Abdomen is flat. Bowel sounds are normal.      Palpations: Abdomen is soft.   Neurological:      Mental Status: She is alert.       Assessment/Plan:     Diagnosis and associated orders:     1. Acute cystitis with hematuria  ciprofloxacin (CIPRO) 250 MG Tab      2. Dysuria  phenazopyridine (PYRIDIUM) 200 MG Tab         Comments/MDM:              Differential diagnosis, natural history, supportive care, and indications for immediate follow-up discussed.    Advised the patient to follow-up with the primary care physician for recheck, reevaluation, and consideration of further management.    Please note that this dictation was created using voice recognition software. I have made a reasonable attempt to correct obvious errors, but I expect that there are errors of grammar and possibly content that I did not discover before finalizing the note.    This note was electronically signed by Madhav Ziegler M.D.  "

## 2023-03-11 ENCOUNTER — HOSPITAL ENCOUNTER (EMERGENCY)
Facility: MEDICAL CENTER | Age: 23
End: 2023-03-11
Attending: EMERGENCY MEDICINE
Payer: COMMERCIAL

## 2023-03-11 ENCOUNTER — APPOINTMENT (OUTPATIENT)
Dept: RADIOLOGY | Facility: MEDICAL CENTER | Age: 23
End: 2023-03-11
Attending: EMERGENCY MEDICINE
Payer: COMMERCIAL

## 2023-03-11 VITALS
HEART RATE: 89 BPM | WEIGHT: 293 LBS | RESPIRATION RATE: 16 BRPM | BODY MASS INDEX: 50.02 KG/M2 | HEIGHT: 64 IN | SYSTOLIC BLOOD PRESSURE: 127 MMHG | DIASTOLIC BLOOD PRESSURE: 77 MMHG | OXYGEN SATURATION: 99 % | TEMPERATURE: 98.2 F

## 2023-03-11 DIAGNOSIS — J02.9 PHARYNGITIS, UNSPECIFIED ETIOLOGY: ICD-10-CM

## 2023-03-11 DIAGNOSIS — F19.90 IVDU (INTRAVENOUS DRUG USER): ICD-10-CM

## 2023-03-11 DIAGNOSIS — J06.9 UPPER RESPIRATORY TRACT INFECTION, UNSPECIFIED TYPE: ICD-10-CM

## 2023-03-11 DIAGNOSIS — L03.114 CELLULITIS OF LEFT UPPER EXTREMITY: ICD-10-CM

## 2023-03-11 LAB — S PYO DNA SPEC NAA+PROBE: NOT DETECTED

## 2023-03-11 PROCEDURE — 87651 STREP A DNA AMP PROBE: CPT

## 2023-03-11 PROCEDURE — 71045 X-RAY EXAM CHEST 1 VIEW: CPT

## 2023-03-11 PROCEDURE — 99284 EMERGENCY DEPT VISIT MOD MDM: CPT

## 2023-03-11 PROCEDURE — A9270 NON-COVERED ITEM OR SERVICE: HCPCS | Performed by: EMERGENCY MEDICINE

## 2023-03-11 PROCEDURE — 700102 HCHG RX REV CODE 250 W/ 637 OVERRIDE(OP): Performed by: EMERGENCY MEDICINE

## 2023-03-11 RX ORDER — CEPHALEXIN 500 MG/1
500 CAPSULE ORAL 4 TIMES DAILY
Qty: 20 CAPSULE | Refills: 0 | Status: ACTIVE | OUTPATIENT
Start: 2023-03-11 | End: 2023-03-15

## 2023-03-11 RX ORDER — IBUPROFEN 600 MG/1
600 TABLET ORAL ONCE
Status: COMPLETED | OUTPATIENT
Start: 2023-03-11 | End: 2023-03-11

## 2023-03-11 RX ORDER — CEPHALEXIN 500 MG/1
500 CAPSULE ORAL ONCE
Status: COMPLETED | OUTPATIENT
Start: 2023-03-11 | End: 2023-03-11

## 2023-03-11 RX ORDER — SULFAMETHOXAZOLE AND TRIMETHOPRIM 800; 160 MG/1; MG/1
1 TABLET ORAL ONCE
Status: COMPLETED | OUTPATIENT
Start: 2023-03-11 | End: 2023-03-11

## 2023-03-11 RX ORDER — ACETAMINOPHEN 325 MG/1
650 TABLET ORAL ONCE
Status: COMPLETED | OUTPATIENT
Start: 2023-03-11 | End: 2023-03-11

## 2023-03-11 RX ORDER — SULFAMETHOXAZOLE AND TRIMETHOPRIM 800; 160 MG/1; MG/1
1 TABLET ORAL 2 TIMES DAILY
Qty: 10 TABLET | Refills: 0 | Status: ACTIVE | OUTPATIENT
Start: 2023-03-11 | End: 2023-03-15

## 2023-03-11 RX ADMIN — IBUPROFEN 600 MG: 600 TABLET, FILM COATED ORAL at 19:11

## 2023-03-11 RX ADMIN — CEPHALEXIN 500 MG: 500 CAPSULE ORAL at 20:09

## 2023-03-11 RX ADMIN — SULFAMETHOXAZOLE AND TRIMETHOPRIM 1 TABLET: 800; 160 TABLET ORAL at 20:09

## 2023-03-11 RX ADMIN — ACETAMINOPHEN 650 MG: 325 TABLET, FILM COATED ORAL at 19:11

## 2023-03-11 ASSESSMENT — FIBROSIS 4 INDEX: FIB4 SCORE: 0.54

## 2023-03-12 NOTE — ED NOTES
Pt aox4, vss, nad, ambulatory steady, pt's partner present   Pt understood all dc info and when to seek medical care, no further questions

## 2023-03-12 NOTE — ED PROVIDER NOTES
ED Provider Note    CHIEF COMPLAINT  Chief Complaint   Patient presents with    Rash     Possible abscess, LUE X 2.     Cough     Onset X approximately 2 weeks.     Sore Throat     No difficulty breathing.            HPI/ROS  LIMITATION TO HISTORY   Select: : None      Mitzi Abel is a 22 y.o. female who presents with a chief complaint of multiple wounds to left upper extremity at sites of prior IV drug use attempt.  Patient reports that she infiltrated her methamphetamine several days prior to arrival.  Patient reports that she has been trying to use warm compresses at home but has redness that she circled and was photographed in the emergency department.  Patient denies any fevers.   Patient also reports cough for the last 2 weeks along with sore throat.  Patient reports she is trying to stop using IV fentanyl and has switched to IV meth and injects into her bilateral breast soft tissue and into her left upper extremity.    PAST MEDICAL HISTORY   has a past medical history of Anxiety, Asthma, Bipolar 2 disorder, Borderline personality disorder (HCC), Eczema (10/15/2010), H/O: Bell's palsy (12/26/2008), IV drug user, Major depressive disorder, Myopia (10/15/2010), and Obesity.    SURGICAL HISTORY  patient denies any surgical history    FAMILY HISTORY  Family History   Problem Relation Age of Onset    Diabetes Mother         Gestational DM    Psychiatric Illness Mother         depression    Arthritis Mother     Hypertension Father     Alcohol/Drug Father     Psychiatric Illness Father         bipolar    Thyroid Paternal Grandmother     Diabetes Paternal Grandfather     Heart Disease Paternal Grandfather     Hypertension Paternal Grandfather     Cancer Paternal Grandfather         prostate    Psychiatric Illness Brother         Bipolar/Bipolar    Arthritis Maternal Grandfather        SOCIAL HISTORY  Social History     Tobacco Use    Smoking status: Every Day     Packs/day: 1.00     Years: 12.00     Pack  "years: 12.00     Types: Cigarettes    Smokeless tobacco: Never   Vaping Use    Vaping Use: Never used   Substance and Sexual Activity    Alcohol use: Not Currently    Drug use: Yes     Types: Intravenous, Injected (Skin Popping)     Comment: meth and heroin    Sexual activity: Yes     Partners: Male     Comment:        CURRENT MEDICATIONS  Home Medications    **Home medications have not yet been reviewed for this encounter**         ALLERGIES  Allergies   Allergen Reactions    Penicillins Anaphylaxis       PHYSICAL EXAM  VITAL SIGNS: BP (!) 141/82   Pulse 86   Temp 36.6 °C (97.9 °F) (Oral)   Resp 16   Ht 1.626 m (5' 4\")   Wt (!) 157 kg (346 lb 12.5 oz)   SpO2 98%   BMI 59.53 kg/m²    Constitutional: Alert in no apparent distress.  HENT: No signs of trauma, Bilateral external ears normal, Nose normal.   Eyes: Pupils are equal and reactive, Conjunctiva normal, Non-icteric.   Neck: Normal range of motion, No tenderness, Supple, No stridor.   Lymphatic: No lymphadenopathy noted.   Cardiovascular: Regular rate and rhythm, no murmurs.   Thorax & Lungs: Normal breath sounds, No respiratory distress, No wheezing, No chest tenderness.   Abdomen: Bowel sounds normal, Soft, No tenderness, No peritoneal signs, No masses, No pulsatile masses.   Skin: Warm, Dry, No erythema, No rash.   Back: No bony tenderness, No CVA tenderness.   Extremities: Intact distal pulses, tenderness palpation of left upper extremity wound with erythema.  No crepitus.  No area of distinct fluctuance., No cyanosis.  2 cm area of erythema to left shoulder as well. Normal radial, median, and ulnar nerve function bilaterally. Less than 3 second capillary refill and 2+ peripheral pulses bilaterally. Normal flexion and extension.  SILT distally.   Musculoskeletal: Good range of motion in all major joints. No tenderness to palpation or major deformities noted.   Neurologic: Alert , Normal motor function, Normal sensory function, No focal deficits " noted.   Psychiatric: Affect normal, Judgment normal, Mood normal.            DIAGNOSTIC STUDIES / PROCEDURES  EKG    RADIOLOGY  I have independently interpreted the diagnostic imaging associated with this visit and am waiting the final reading from the radiologist.   My preliminary interpretation is as follows: no pna or PTX  Radiologist interpretation:   DX-CHEST-PORTABLE (1 VIEW)   Final Result      No acute cardiac or pulmonary abnormalities are identified.           COURSE & MEDICAL DECISION MAKING  INITIAL ASSESSMENT, COURSE AND PLAN  Care Narrative: 22-year-old female with history of IV drug use presents emerged department with chief complaint of multiple wounds to left upper extremity that have become more painful over time.  Patient reports using IV drugs within the last couple days.  Patient reports that she missed her intended blood vessel and has had some erythema overlying her forearm since that time.    Patient denies any fever.  Ultrasound at bedside performed by me with cobblestoning present consistent with cellulitis and no area of posterior acoustic enhancement or fluid collection consistent with abscess at this time.    Shared decision making conversation had between myself and patient regarding admission and IV antibiotics given my concern for multiple different lesions.  Patient states that at this time she would prefer to be treated as an outpatient and agrees to take first dose of antibiotics while in the hospital tonight and second dose tomorrow.    Patient agrees to return if symptoms worsen or progress  Strep test pending at time of discharge      Pt w/ no crepitus or tissue necrosis to suggest concern for Necrotizing fascitis at this time  Pt agrees to immediately return to ED if worsen or if pain worsens.  No hx to suggest underlying osteonecrosis or retained foreign body  Exam not c/w absccess at this time, no area of flutuance on exam or obvious fluid collection  Plan d/c home on antibx      DISPOSITION AND DISCUSSIONS      Discussion of management with other \[facility] [place] [facility]\"" or appropriate source(s): Pharmacy dosing of bactrim      Escalation of care considered, and ultimately not performed:after discussion with the patient / family, they have elected to decline an escalation in care    FINAL DIAGNOSIS  1. Cellulitis of left upper extremity    2. Pharyngitis, unspecified etiology    3. Upper respiratory tract infection, unspecified type    4. IVDU (intravenous drug user)           Electronically signed by: Rashid Alexandre M.D., 3/11/2023 6:55 PM

## 2023-03-12 NOTE — ED TRIAGE NOTES
".  Chief Complaint   Patient presents with    Rash     Sores, LUE X 2.     Cough     Onset X approximately 2 weeks.     Sore Throat     No difficulty breathing.    Raised possible abscess X 2 noted LUE.  Pt reports \" shooting meth \" Friday evening.  + erythema, edema noted.  Area warm to touch.  No fever, + chills.  Last meth use Friday.     "

## 2023-03-14 ENCOUNTER — HOSPITAL ENCOUNTER (OUTPATIENT)
Facility: MEDICAL CENTER | Age: 23
End: 2023-03-15
Attending: EMERGENCY MEDICINE | Admitting: STUDENT IN AN ORGANIZED HEALTH CARE EDUCATION/TRAINING PROGRAM
Payer: COMMERCIAL

## 2023-03-14 ENCOUNTER — APPOINTMENT (OUTPATIENT)
Dept: RADIOLOGY | Facility: MEDICAL CENTER | Age: 23
End: 2023-03-14
Attending: EMERGENCY MEDICINE
Payer: COMMERCIAL

## 2023-03-14 DIAGNOSIS — R50.9 FEVER AND CHILLS: ICD-10-CM

## 2023-03-14 DIAGNOSIS — L03.114 CELLULITIS OF LEFT UPPER EXTREMITY: ICD-10-CM

## 2023-03-14 DIAGNOSIS — F19.90 IVDU (INTRAVENOUS DRUG USER): ICD-10-CM

## 2023-03-14 PROBLEM — L02.419 CELLULITIS AND ABSCESS OF UPPER EXTREMITY: Status: ACTIVE | Noted: 2023-03-14

## 2023-03-14 PROBLEM — L03.119 CELLULITIS AND ABSCESS OF UPPER EXTREMITY: Status: ACTIVE | Noted: 2023-03-14

## 2023-03-14 PROBLEM — L03.90 CELLULITIS: Status: ACTIVE | Noted: 2023-03-14

## 2023-03-14 PROBLEM — J02.9 SORE THROAT: Status: ACTIVE | Noted: 2023-03-14

## 2023-03-14 LAB
ANION GAP SERPL CALC-SCNC: 12 MMOL/L (ref 7–16)
BASOPHILS # BLD AUTO: 0.8 % (ref 0–1.8)
BASOPHILS # BLD: 0.05 K/UL (ref 0–0.12)
BUN SERPL-MCNC: 7 MG/DL (ref 8–22)
CALCIUM SERPL-MCNC: 9.3 MG/DL (ref 8.5–10.5)
CHLORIDE SERPL-SCNC: 104 MMOL/L (ref 96–112)
CO2 SERPL-SCNC: 22 MMOL/L (ref 20–33)
CREAT SERPL-MCNC: 0.75 MG/DL (ref 0.5–1.4)
EOSINOPHIL # BLD AUTO: 0.1 K/UL (ref 0–0.51)
EOSINOPHIL NFR BLD: 1.6 % (ref 0–6.9)
ERYTHROCYTE [DISTWIDTH] IN BLOOD BY AUTOMATED COUNT: 62.4 FL (ref 35.9–50)
FLUAV RNA SPEC QL NAA+PROBE: NEGATIVE
FLUBV RNA SPEC QL NAA+PROBE: NEGATIVE
GFR SERPLBLD CREATININE-BSD FMLA CKD-EPI: 114 ML/MIN/1.73 M 2
GLUCOSE SERPL-MCNC: 114 MG/DL (ref 65–99)
GRAM STN SPEC: NORMAL
HCG SERPL QL: NEGATIVE
HCT VFR BLD AUTO: 43.3 % (ref 37–47)
HGB BLD-MCNC: 13.5 G/DL (ref 12–16)
IMM GRANULOCYTES # BLD AUTO: 0.03 K/UL (ref 0–0.11)
IMM GRANULOCYTES NFR BLD AUTO: 0.5 % (ref 0–0.9)
LACTATE SERPL-SCNC: 1.1 MMOL/L (ref 0.5–2)
LACTATE SERPL-SCNC: 1.1 MMOL/L (ref 0.5–2)
LACTATE SERPL-SCNC: 2.1 MMOL/L (ref 0.5–2)
LYMPHOCYTES # BLD AUTO: 2.33 K/UL (ref 1–4.8)
LYMPHOCYTES NFR BLD: 37.3 % (ref 22–41)
MCH RBC QN AUTO: 27.8 PG (ref 27–33)
MCHC RBC AUTO-ENTMCNC: 31.2 G/DL (ref 33.6–35)
MCV RBC AUTO: 89.3 FL (ref 81.4–97.8)
MONOCYTES # BLD AUTO: 0.5 K/UL (ref 0–0.85)
MONOCYTES NFR BLD AUTO: 8 % (ref 0–13.4)
NEUTROPHILS # BLD AUTO: 3.23 K/UL (ref 2–7.15)
NEUTROPHILS NFR BLD: 51.8 % (ref 44–72)
NRBC # BLD AUTO: 0 K/UL
NRBC BLD-RTO: 0 /100 WBC
PLATELET # BLD AUTO: 200 K/UL (ref 164–446)
PMV BLD AUTO: 10.2 FL (ref 9–12.9)
POTASSIUM SERPL-SCNC: 4.2 MMOL/L (ref 3.6–5.5)
RBC # BLD AUTO: 4.85 M/UL (ref 4.2–5.4)
RSV RNA SPEC QL NAA+PROBE: NEGATIVE
SARS-COV-2 RNA RESP QL NAA+PROBE: NOTDETECTED
SCCMEC + MECA PNL NOSE NAA+PROBE: NEGATIVE
SIGNIFICANT IND 70042: NORMAL
SITE SITE: NORMAL
SODIUM SERPL-SCNC: 138 MMOL/L (ref 135–145)
SOURCE SOURCE: NORMAL
SPECIMEN SOURCE: NORMAL
WBC # BLD AUTO: 6.2 K/UL (ref 4.8–10.8)

## 2023-03-14 PROCEDURE — 36415 COLL VENOUS BLD VENIPUNCTURE: CPT

## 2023-03-14 PROCEDURE — 87205 SMEAR GRAM STAIN: CPT

## 2023-03-14 PROCEDURE — G0378 HOSPITAL OBSERVATION PER HR: HCPCS

## 2023-03-14 PROCEDURE — 99223 1ST HOSP IP/OBS HIGH 75: CPT | Performed by: STUDENT IN AN ORGANIZED HEALTH CARE EDUCATION/TRAINING PROGRAM

## 2023-03-14 PROCEDURE — 87077 CULTURE AEROBIC IDENTIFY: CPT

## 2023-03-14 PROCEDURE — 96372 THER/PROPH/DIAG INJ SC/IM: CPT | Mod: XU

## 2023-03-14 PROCEDURE — 700105 HCHG RX REV CODE 258

## 2023-03-14 PROCEDURE — 96375 TX/PRO/DX INJ NEW DRUG ADDON: CPT | Mod: XU

## 2023-03-14 PROCEDURE — 700111 HCHG RX REV CODE 636 W/ 250 OVERRIDE (IP): Performed by: EMERGENCY MEDICINE

## 2023-03-14 PROCEDURE — 96365 THER/PROPH/DIAG IV INF INIT: CPT

## 2023-03-14 PROCEDURE — 71045 X-RAY EXAM CHEST 1 VIEW: CPT

## 2023-03-14 PROCEDURE — 80048 BASIC METABOLIC PNL TOTAL CA: CPT

## 2023-03-14 PROCEDURE — 84703 CHORIONIC GONADOTROPIN ASSAY: CPT

## 2023-03-14 PROCEDURE — 303977 HCHG I & D

## 2023-03-14 PROCEDURE — 94760 N-INVAS EAR/PLS OXIMETRY 1: CPT

## 2023-03-14 PROCEDURE — 99285 EMERGENCY DEPT VISIT HI MDM: CPT

## 2023-03-14 PROCEDURE — 700111 HCHG RX REV CODE 636 W/ 250 OVERRIDE (IP)

## 2023-03-14 PROCEDURE — 700111 HCHG RX REV CODE 636 W/ 250 OVERRIDE (IP): Performed by: STUDENT IN AN ORGANIZED HEALTH CARE EDUCATION/TRAINING PROGRAM

## 2023-03-14 PROCEDURE — 700105 HCHG RX REV CODE 258: Performed by: EMERGENCY MEDICINE

## 2023-03-14 PROCEDURE — 96366 THER/PROPH/DIAG IV INF ADDON: CPT | Mod: XU

## 2023-03-14 PROCEDURE — 87070 CULTURE OTHR SPECIMN AEROBIC: CPT

## 2023-03-14 PROCEDURE — 0241U HCHG SARS-COV-2 COVID-19 NFCT DS RESP RNA 4 TRGT MIC: CPT

## 2023-03-14 PROCEDURE — 83605 ASSAY OF LACTIC ACID: CPT | Mod: 91

## 2023-03-14 PROCEDURE — 87186 SC STD MICRODIL/AGAR DIL: CPT

## 2023-03-14 PROCEDURE — 36415 COLL VENOUS BLD VENIPUNCTURE: CPT | Mod: XU

## 2023-03-14 PROCEDURE — 87040 BLOOD CULTURE FOR BACTERIA: CPT | Mod: 91

## 2023-03-14 PROCEDURE — 87641 MR-STAPH DNA AMP PROBE: CPT

## 2023-03-14 PROCEDURE — 87147 CULTURE TYPE IMMUNOLOGIC: CPT

## 2023-03-14 PROCEDURE — 96367 TX/PROPH/DG ADDL SEQ IV INF: CPT

## 2023-03-14 PROCEDURE — C9803 HOPD COVID-19 SPEC COLLECT: HCPCS | Performed by: STUDENT IN AN ORGANIZED HEALTH CARE EDUCATION/TRAINING PROGRAM

## 2023-03-14 PROCEDURE — 700101 HCHG RX REV CODE 250: Performed by: EMERGENCY MEDICINE

## 2023-03-14 PROCEDURE — 85025 COMPLETE CBC W/AUTO DIFF WBC: CPT

## 2023-03-14 RX ORDER — CEFTRIAXONE 2 G/1
2000 INJECTION, POWDER, FOR SOLUTION INTRAMUSCULAR; INTRAVENOUS ONCE
Status: COMPLETED | OUTPATIENT
Start: 2023-03-14 | End: 2023-03-14

## 2023-03-14 RX ORDER — ONDANSETRON 2 MG/ML
4 INJECTION INTRAMUSCULAR; INTRAVENOUS ONCE
Status: COMPLETED | OUTPATIENT
Start: 2023-03-14 | End: 2023-03-14

## 2023-03-14 RX ORDER — KETOROLAC TROMETHAMINE 30 MG/ML
30 INJECTION, SOLUTION INTRAMUSCULAR; INTRAVENOUS ONCE
Status: COMPLETED | OUTPATIENT
Start: 2023-03-14 | End: 2023-03-14

## 2023-03-14 RX ORDER — AMOXICILLIN 250 MG
2 CAPSULE ORAL 2 TIMES DAILY
Status: DISCONTINUED | OUTPATIENT
Start: 2023-03-14 | End: 2023-03-15 | Stop reason: HOSPADM

## 2023-03-14 RX ORDER — SODIUM CHLORIDE, SODIUM LACTATE, POTASSIUM CHLORIDE, AND CALCIUM CHLORIDE .6; .31; .03; .02 G/100ML; G/100ML; G/100ML; G/100ML
30 INJECTION, SOLUTION INTRAVENOUS ONCE
Status: COMPLETED | OUTPATIENT
Start: 2023-03-14 | End: 2023-03-14

## 2023-03-14 RX ORDER — LIDOCAINE HYDROCHLORIDE AND EPINEPHRINE BITARTRATE 20; .01 MG/ML; MG/ML
10 INJECTION, SOLUTION SUBCUTANEOUS ONCE
Status: COMPLETED | OUTPATIENT
Start: 2023-03-14 | End: 2023-03-14

## 2023-03-14 RX ORDER — BISACODYL 10 MG
10 SUPPOSITORY, RECTAL RECTAL
Status: DISCONTINUED | OUTPATIENT
Start: 2023-03-14 | End: 2023-03-15 | Stop reason: HOSPADM

## 2023-03-14 RX ORDER — NALOXONE HYDROCHLORIDE 0.4 MG/ML
0.4 INJECTION, SOLUTION INTRAMUSCULAR; INTRAVENOUS; SUBCUTANEOUS PRN
Status: DISCONTINUED | OUTPATIENT
Start: 2023-03-14 | End: 2023-03-15 | Stop reason: HOSPADM

## 2023-03-14 RX ORDER — POLYETHYLENE GLYCOL 3350 17 G/17G
1 POWDER, FOR SOLUTION ORAL
Status: DISCONTINUED | OUTPATIENT
Start: 2023-03-14 | End: 2023-03-15 | Stop reason: HOSPADM

## 2023-03-14 RX ORDER — ENOXAPARIN SODIUM 100 MG/ML
40 INJECTION SUBCUTANEOUS DAILY
Status: DISCONTINUED | OUTPATIENT
Start: 2023-03-14 | End: 2023-03-15 | Stop reason: HOSPADM

## 2023-03-14 RX ORDER — LIDOCAINE HYDROCHLORIDE 20 MG/ML
JELLY TOPICAL ONCE
Status: COMPLETED | OUTPATIENT
Start: 2023-03-14 | End: 2023-03-14

## 2023-03-14 RX ADMIN — ONDANSETRON 4 MG: 2 INJECTION INTRAMUSCULAR; INTRAVENOUS at 04:58

## 2023-03-14 RX ADMIN — LIDOCAINE HYDROCHLORIDE 6 ML: 20 JELLY TOPICAL at 05:15

## 2023-03-14 RX ADMIN — FENTANYL CITRATE 50 MCG: 50 INJECTION, SOLUTION INTRAMUSCULAR; INTRAVENOUS at 04:48

## 2023-03-14 RX ADMIN — LIDOCAINE HYDROCHLORIDE AND EPINEPHRINE 10 ML: 20; 10 INJECTION, SOLUTION INFILTRATION; PERINEURAL at 05:15

## 2023-03-14 RX ADMIN — CEFTRIAXONE SODIUM 2000 MG: 2 INJECTION, POWDER, FOR SOLUTION INTRAMUSCULAR; INTRAVENOUS at 04:49

## 2023-03-14 RX ADMIN — MORPHINE SULFATE 6 MG: 10 INJECTION INTRAVENOUS at 04:58

## 2023-03-14 RX ADMIN — KETOROLAC TROMETHAMINE 30 MG: 30 INJECTION, SOLUTION INTRAMUSCULAR at 04:57

## 2023-03-14 RX ADMIN — VANCOMYCIN HYDROCHLORIDE 2000 MG: 500 INJECTION, POWDER, LYOPHILIZED, FOR SOLUTION INTRAVENOUS at 22:14

## 2023-03-14 RX ADMIN — VANCOMYCIN HYDROCHLORIDE 2000 MG: 500 INJECTION, POWDER, LYOPHILIZED, FOR SOLUTION INTRAVENOUS at 14:21

## 2023-03-14 RX ADMIN — ENOXAPARIN SODIUM 40 MG: 40 INJECTION SUBCUTANEOUS at 18:03

## 2023-03-14 RX ADMIN — SODIUM CHLORIDE, POTASSIUM CHLORIDE, SODIUM LACTATE AND CALCIUM CHLORIDE 1641 ML: 600; 310; 30; 20 INJECTION, SOLUTION INTRAVENOUS at 04:53

## 2023-03-14 RX ADMIN — VANCOMYCIN HYDROCHLORIDE 3 G: 500 INJECTION, POWDER, LYOPHILIZED, FOR SOLUTION INTRAVENOUS at 05:50

## 2023-03-14 ASSESSMENT — ENCOUNTER SYMPTOMS
WEIGHT LOSS: 0
FEVER: 0
HEADACHES: 0
ABDOMINAL PAIN: 0
SHORTNESS OF BREATH: 0
SORE THROAT: 1
SINUS PAIN: 0
DIARRHEA: 0
PALPITATIONS: 0
DIZZINESS: 0
FEVER: 1
CHILLS: 1
CHILLS: 0
NAUSEA: 0
VOMITING: 0
COUGH: 0
HEARTBURN: 0

## 2023-03-14 ASSESSMENT — COGNITIVE AND FUNCTIONAL STATUS - GENERAL
SUGGESTED CMS G CODE MODIFIER DAILY ACTIVITY: CH
DAILY ACTIVITIY SCORE: 24
SUGGESTED CMS G CODE MODIFIER MOBILITY: CH
MOBILITY SCORE: 24

## 2023-03-14 ASSESSMENT — LIFESTYLE VARIABLES
HOW MANY TIMES IN THE PAST YEAR HAVE YOU HAD 5 OR MORE DRINKS IN A DAY: 0
HAVE YOU EVER FELT YOU SHOULD CUT DOWN ON YOUR DRINKING: NO
ALCOHOL_USE: NO
TOTAL SCORE: 0
EVER HAD A DRINK FIRST THING IN THE MORNING TO STEADY YOUR NERVES TO GET RID OF A HANGOVER: NO
AVERAGE NUMBER OF DAYS PER WEEK YOU HAVE A DRINK CONTAINING ALCOHOL: 0
ON A TYPICAL DAY WHEN YOU DRINK ALCOHOL HOW MANY DRINKS DO YOU HAVE: 0
TOTAL SCORE: 0
CONSUMPTION TOTAL: NEGATIVE
TOTAL SCORE: 0
EVER FELT BAD OR GUILTY ABOUT YOUR DRINKING: NO
HAVE PEOPLE ANNOYED YOU BY CRITICIZING YOUR DRINKING: NO

## 2023-03-14 ASSESSMENT — PATIENT HEALTH QUESTIONNAIRE - PHQ9
1. LITTLE INTEREST OR PLEASURE IN DOING THINGS: NOT AT ALL
SUM OF ALL RESPONSES TO PHQ9 QUESTIONS 1 AND 2: 0
SUM OF ALL RESPONSES TO PHQ9 QUESTIONS 1 AND 2: 0
2. FEELING DOWN, DEPRESSED, IRRITABLE, OR HOPELESS: NOT AT ALL
1. LITTLE INTEREST OR PLEASURE IN DOING THINGS: NOT AT ALL

## 2023-03-14 ASSESSMENT — PAIN DESCRIPTION - PAIN TYPE: TYPE: ACUTE PAIN

## 2023-03-14 ASSESSMENT — FIBROSIS 4 INDEX: FIB4 SCORE: 0.54

## 2023-03-14 NOTE — ASSESSMENT & PLAN NOTE
Secondary to IV drug use, failed outpatient treatment, cellulitis progressed to abscess, drained in ED.  Started on ceftriaxone and vancomycin in ED, continue for now  Fluid/pus sent for culture, blood cultures as well, follow and further management per results

## 2023-03-14 NOTE — DIETARY
NUTRITION SERVICES: BMI - Pt with BMI >40 (=Body mass index is 58.24 kg/m².), Class III obesity. Weight loss counseling not appropriate in acute care setting.     RECOMMEND - If appropriate at DC please refer to outpatient nutrition services for weight management.     RD to monitor per department policy.

## 2023-03-14 NOTE — ED TRIAGE NOTES
Chief Complaint   Patient presents with    Arm Swelling     Pt with infection to L upper arm, was here 2 days ago and given oral antibiotics, redness and swelling are increasing along with the pain        Denies fevers.    Pt ambulatory to triage for above complaint.       Pt is alert/oriented and follows commands. Pt speaking in full sentences and responds appropriately to questions. No acute distress noted in triage and respirations are even and unlabored.      Pt placed in lobby and educated on triage process. Pt encouraged to alert staff for any changes in condition

## 2023-03-14 NOTE — ASSESSMENT & PLAN NOTE
Patient was somnolent after pain medication and did not want to participate in conversation about cessation, continue counseling when appropriate

## 2023-03-14 NOTE — PROGRESS NOTES
Pharmacy Vancomycin Kinetics Note for 3/14/2023     22 y.o. female on Vancomycin day # 1     Vancomycin Indication (AUC Dosing): Skin/skin structure infection    Provider specified end date: 03/17/23    Active Antibiotics (From admission, onward)      Ordered     Ordering Provider       Tue Mar 14, 2023  9:02 AM    03/14/23 0902  vancomycin (VANCOCIN) 2,000 mg in  mL IVPB  (vancomycin (VANCOCIN) IV (LD + Maintenance))  EVERY 8 HOURS         KODI Whittaker       Tue Mar 14, 2023  6:07 AM    03/14/23 0607  cefTRIAXone (Rocephin) syringe 2,000 mg  EVERY 24 HOURS         Luis Allison M.D.    03/14/23 0607  MD Alert...Vancomycin per Pharmacy  PHARMACY TO DOSE        Question:  Indication(s) for vancomycin?  Answer:  Skin and soft tissue infection    Luis Allison M.D.          Dosing Weight: 154 kg (339 lb 8.1 oz)    Admission History: Admitted on 3/14/2023 for Cellulitis [L03.90]  Pertinent history: LUE cellulitis & abscess, failed oral antibiotics outpatient. Initiated on empiric vanc/CTX.    Allergies:     Penicillins     Pertinent cultures to date:     Results       Procedure Component Value Units Date/Time    COV-2, FLU A/B, AND RSV BY PCR (2-4 HOURS [facility]): Collect NP swab in VTM [[id number]] Collected: 03/14/23 0635    Order Status: Completed Specimen: Respirate Updated: 03/14/23 0751     Influenza virus A RNA Negative     Influenza virus B, PCR Negative     RSV, PCR Negative     SARS-CoV-2 by PCR NotDetected     Comment: [facility] providers: PLEASE REFER TO DE-ESCALATION AND RETESTING PROTOCOL  on [email or web address]    **The [employer] GeneXpert Xpress SARS-CoV-2 RT-PCR Test has been made  available for use under the Emergency Use Authorization (EUA) only.          SARS-CoV-2 Source NP Swab    MRSA By PCR (Amp) [[id number]]     Order Status: No result Specimen: Respirate from Nares     Blood Culture [[id number]] Collected: 03/14/23 0544    Order Status: Sent Specimen: Blood from Peripheral  "Updated: 23    Narrative:      2 of 2 blood culture x2  Sites order. Per Hospital Policy:  Only change Specimen Src: to \"Line\" if specified by physician  order.    CULTURE WOUND W/ GRAM STAIN [[id number]] Collected: 2353    Order Status: Sent Specimen: Wound from Abscess Updated: 23    Blood Culture [[id number]] Collected: 23    Order Status: Sent Specimen: Blood from Peripheral Updated: 23    Narrative:      1 of 2 for Blood Culture x 2 sites order. Per Hospital  Policy: Only change Specimen Src: to \"Line\" if specified by  physician order.            Labs:     Estimated Creatinine Clearance: 172.4 mL/min (by C-G formula based on SCr of 0.75 mg/dL).  Recent Labs     23   WBC 6.2   NEUTSPOLYS 51.80     Recent Labs     23   BUN 7*   CREATININE 0.75     No intake or output data in the 24 hours ending 23 09   /50   Pulse 76   Temp 36.2 °C (97.2 °F) (Temporal)   Resp 18   Ht 1.626 m (5' 4\")   Wt (!) 154 kg (339 lb 4.6 oz)   SpO2 97%  Temp (24hrs), Av.5 °C (97.7 °F), Min:36.2 °C (97.2 °F), Max:36.7 °C (98.1 °F)    List concerns for Vancomycin clearance:     Obesity;Hypermetabolic State (SIRS)    Pharmacokinetics:    AUC kinetics:   Ke (hr ^-1): 0.1472 hr^-1  Half life: 4.71 hr  Clearance: 14.735  Estimated TDD: 7367.5  Estimated Dose: 2057  Estimated interval: 6.7    A/P:     -  Vancomycin dose: 3000 mg once, followed by 2000 mg q8h    -  Next vancomycin level(s): 3/16 (peak @ 0100, trough @ 0530)    -  Predicted vancomycin AUC from initial AUC test calculator: 407 mg·hr/L    -  Comments: LUE cellulitis & abscess, hx of IVDU, failed keflex & bactim therapy outpatient. Initiated on empiric vanc/CTX. Abscess drained in ED, cx pending. MRSA nares ordered to aid in de-escalation. Predicted AUC of 407 (goal 400-600) using dosing above. Concern for accumulation in setting of morbid obesity (BMI 58), however anticipate patient will " clear vanco extensively given young age, great renal fx, & hypermetabolic state 2' IVDU - will assess steady state levels on 3/16.     Pharmacy will continue following.      Thanh Martinez, DameonD

## 2023-03-14 NOTE — PROGRESS NOTES
[facility] Medicine Daily Progress Note    Date of Service  3/14/2023    Chief Complaint  Mitzi Abel is a 22 y.o. female admitted 3/14/2023 with left arm pain and swelling.    Hospital Course  Mitzi Abel is a 22 y.o. female who presented 3/14/2023 with left arm pain and swelling.  Patient has a history of IV drug use, fentanyl and meth, and was recently seen on 3/11 due to skin infection appears to be cellulitis at the site of her drug injection, she reports she missed her vein and popped into her skin and this is why she got infected.  She was started on Bactrim but has had worsening pain, now with increased swelling and expanding redness around the site.  This morning she awoke feeling chills.  Does not have any nausea/vomiting, reports having had a sore throat for the past week, no shortness of breath, she has OxyMask on as she reports she has sleep apnea and when she falls asleep her oxygen goes down.     In the ED she was not found to be septic, white count 6.2, vital signs stable and afebrile.  Bedside ultrasound and drainage of abscess was performed by the EDP.    Interval Problem Update  3/14 afebrile, vitals stable, on 2L oxy mask at this time.  She is resting comfortably.  ERP drained left AC abscess, bruising and track marks to left AC.  Continue vanco and ceftriaxone.  Await cultures.    I have discussed this patient's plan of care and discharge plan at IDT rounds today with Case Management, Nursing, Nursing leadership, and other members of the IDT team.    Consultants/Specialty  none    Code Status  Full Code    Disposition  Patient is not medically cleared for discharge.   Anticipate discharge to to home with close outpatient follow-up.  I have placed the appropriate orders for post-discharge needs.    Review of Systems  Review of Systems   Constitutional:  Negative for chills, fever and malaise/fatigue.   Respiratory:  Negative for cough and shortness of breath.     Cardiovascular:  Negative for chest pain, palpitations and leg swelling.   Gastrointestinal:  Negative for abdominal pain, diarrhea, heartburn, nausea and vomiting.   Genitourinary:  Negative for dysuria, frequency and urgency.   Musculoskeletal:  Positive for joint pain.   Skin:         Left AC with bruising and track marks   Neurological:  Negative for dizziness and headaches.   All other systems reviewed and are negative.     Physical Exam  Temp:  [36.2 °C (97.2 °F)-36.7 °C (98.1 °F)] 36.2 °C (97.2 °F)  Pulse:  [66-97] 76  Resp:  [14-20] 18  BP: (120-138)/(50-79) 138/50  SpO2:  [92 %-99 %] 97 %    Physical Exam  Vitals and nursing note reviewed.   Constitutional:       Appearance: Normal appearance. She is morbidly obese. She is not ill-appearing.      Interventions: Face mask in place.   HENT:      Head: Normocephalic and atraumatic.      Jaw: There is normal jaw occlusion.      Right Ear: Hearing normal.      Left Ear: Hearing normal.      Nose: Nose normal.      Mouth/Throat:      Lips: Pink.      Mouth: Mucous membranes are moist.   Eyes:      Extraocular Movements: Extraocular movements intact.      Conjunctiva/sclera: Conjunctivae normal.      Pupils: Pupils are equal, round, and reactive to light.   Neck:      Vascular: No carotid bruit.   Cardiovascular:      Rate and Rhythm: Normal rate and regular rhythm.      Pulses: Normal pulses.      Heart sounds: Normal heart sounds, S1 normal and S2 normal.   Pulmonary:      Effort: Pulmonary effort is normal.      Breath sounds: Normal breath sounds and air entry. No stridor.   Abdominal:      General: Abdomen is protuberant. Bowel sounds are normal.      Tenderness: There is no abdominal tenderness.   Musculoskeletal:      Cervical back: Normal range of motion and neck supple.      Right lower leg: No edema.      Left lower leg: No edema.   Skin:     General: Skin is warm and dry.      Capillary Refill: Capillary refill takes less than 2 seconds.    Neurological:      General: No focal deficit present.      Mental Status: She is alert and oriented to person, place, and time. Mental status is at baseline.      Sensory: Sensation is intact.      Motor: Motor function is intact.   Psychiatric:         Attention and Perception: Attention and perception normal.         Mood and Affect: Mood and affect normal.         Speech: Speech normal.         Behavior: Behavior normal. Behavior is cooperative.       Fluids  No intake or output data in the 24 hours ending 03/14/23 0912    Laboratory  Recent Labs     03/14/23  0357   WBC 6.2   RBC 4.85   HEMOGLOBIN 13.5   HEMATOCRIT 43.3   MCV 89.3   MCH 27.8   MCHC 31.2*   RDW 62.4*   PLATELETCT 200   MPV 10.2     Recent Labs     03/14/23  0357   SODIUM 138   POTASSIUM 4.2   CHLORIDE 104   CO2 22   GLUCOSE 114*   BUN 7*   CREATININE 0.75   CALCIUM 9.3                   Imaging  DX-CHEST-PORTABLE (1 VIEW)   Final Result         1.  No acute cardiopulmonary disease.           Assessment/Plan  * Cellulitis and abscess of upper extremity- (present on admission)  Assessment & Plan  Secondary to IV drug use, failed outpatient treatment, cellulitis progressed to abscess, drained in ED.  Started on ceftriaxone and vancomycin in ED, continue for now  Fluid/pus sent for culture, blood cultures as well, follow and further management per results      Sore throat  Assessment & Plan  Patient reports having sore throat for past week  Viral panel negative    Opiate dependence (HCC)- (present on admission)  Assessment & Plan  Currently no withdrawal symptoms.  Continue counseling on opiate cessation.    Morbid (severe) obesity due to excess calories (HCC)- (present on admission)  Assessment & Plan  BMI 58.24  Continue counseling on weight loss    Tobacco dependence- (present on admission)  Assessment & Plan  Patient was somnolent after pain medication and did not want to participate in conversation about cessation, continue counseling when  appropriate         VTE prophylaxis: enoxaparin ppx    I have performed a physical exam and reviewed and updated ROS and Plan today (3/14/2023). In review of yesterday's note (3/13/2023), there are no changes except as documented above.

## 2023-03-14 NOTE — DISCHARGE PLANNING
Care Transition Team Assessment      CM spoke with patient at bedside regarding DCP. Patient states that she lives with her family in a single level home with no steps. Patient states that she is ambulatory and uses no DME at home. Patient states that she was independent with ADL's and IADL's. Patient verified demographics and insurance. Dispo at this time is home self care vs home with home health. CM will continue to follow with all discharge needs.       Information Source  Orientation Level: Oriented X4  Information Given By: Patient  Who is responsible for making decisions for patient? : Patient                   Discharge Preparedness  What is your plan after discharge?: Home with help  What are your discharge supports?: Other (comment) (Family)         Finances  Financial Barriers to Discharge: No  Prescription Coverage: Yes              Advance Directive  Advance Directive?: None    Domestic Abuse  Have you ever been the victim of abuse or violence?: No              Anticipated Discharge Information  Discharge Disposition: Discharged to home/self care (01)

## 2023-03-14 NOTE — ED PROVIDER NOTES
ED Provider Note    CHIEF COMPLAINT  Chief Complaint   Patient presents with    Arm Swelling     Pt with infection to L upper arm, was here 2 days ago and given oral antibiotics, redness and swelling are increasing along with the pain     EXTERNAL RECORDS REVIEWED  Outpatient Notes outpatient evaluation in 3/3 the patient had hematuria    HPI/ROS  LIMITATION TO HISTORY   Select: : None  OUTSIDE HISTORIAN(S):  Friend at bedside    Mitzi Abel is a 22 y.o. female who presents to the emergency room for evaluation of worsening arm redness, pain despite being on medications.  The patient had been seen on 3/11 for cellulitic skin changes at the site of IV drug injection.  She was started on oral Bactrim and since that time has been taking the medications but has had worsening pain, worsening swelling and has had expansion of the redness beyond the boundaries of her previously demarcated area into the remainder of the left upper arm.  This has not been rapidly progressive, has not been associated with worsening chest pain, shortness of breath.  She had subjective fevers and chills but nothing measured.    She had been seen by my colleague on 3/11/2023 for sore throat, cough and skin changes of likely abscess in the left upper extremity.  Patient has a history of IV drug use and switch from fentanyl to methamphetamine and at the time of evaluation was done a acute septic etiology, no rapidly progressing skin changes, no true areas of fluctuance or obvious fluid collection but was started on antibiotics for upper extremity cellulitis in the setting of IV drug use.  Was discharged on Bactrim    PAST MEDICAL HISTORY   has a past medical history of Anxiety, Asthma, Bipolar 2 disorder, Borderline personality disorder (HCC), Eczema (10/15/2010), H/O: Bell's palsy (12/26/2008), IV drug user, Major depressive disorder, Myopia (10/15/2010), and Obesity.    SURGICAL HISTORY  patient denies any surgical history    FAMILY  "HISTORY  Family History   Problem Relation Age of Onset    Diabetes Mother         Gestational DM    Psychiatric Illness Mother         depression    Arthritis Mother     Hypertension Father     Alcohol/Drug Father     Psychiatric Illness Father         bipolar    Thyroid Paternal Grandmother     Diabetes Paternal Grandfather     Heart Disease Paternal Grandfather     Hypertension Paternal Grandfather     Cancer Paternal Grandfather         prostate    Psychiatric Illness Brother         Bipolar/Bipolar    Arthritis Maternal Grandfather        SOCIAL HISTORY  Social History     Tobacco Use    Smoking status: Every Day     Packs/day: 1.00     Years: 12.00     Pack years: 12.00     Types: Cigarettes    Smokeless tobacco: Never   Vaping Use    Vaping Use: Never used   Substance and Sexual Activity    Alcohol use: Not Currently    Drug use: Yes     Types: Intravenous, Injected (Skin Popping)     Comment: meth and heroin    Sexual activity: Yes     Partners: Male     Comment:        CURRENT MEDICATIONS  Home Medications       Reviewed by Kendal Rajput R.N. (Registered Nurse) on 03/14/23 at 0300  Med List Status: Not Addressed     Medication Last Dose Status   cephALEXin (KEFLEX) 500 MG Cap  Active   ibuprofen (MOTRIN) 600 MG Tab  Active   Levonorgestrel (MIRENA, 52 MG, IU)  Active   Naloxone (NARCAN) 4 MG/0.1ML Liquid  Active   naltrexone (DEPADE) 50 MG Tab  Active   phenazopyridine (PYRIDIUM) 200 MG Tab  Active   sulfamethoxazole-trimethoprim (BACTRIM DS) 800-160 MG tablet  Active                    ALLERGIES  Allergies   Allergen Reactions    Penicillins Anaphylaxis       PHYSICAL EXAM  VITAL SIGNS: /60   Pulse 66   Temp 36.7 °C (98.1 °F) (Temporal)   Resp 14   Ht 1.626 m (5' 4\")   Wt (!) 154 kg (339 lb 4.6 oz)   SpO2 98%   BMI 58.24 kg/m²    Genl: F sitting in gurney uncomfortably, speaking clearly, appears in mild  distress   Head: NC/AT   ENT: Mucous membranes moist, posterior pharynx clear, " "uvula midline, nares patent bilaterally   Pulmonary: Lungs are clear to auscultation bilaterally  Chest: No TTP  CV:  tachycardia, no murmur appreciated, pulses 2+ in both upper and lower extremities,  Abdomen: soft, NT/ND; no rebound/guarding, no masses palpated, no HSM   : no CVA or suprapubic tenderness   Musculoskeletal: Pain free ROM of the neck. Moving upper and lower extremities and spontaneous in coordinated fashion.  Left Upper Extremity:  - Skin: Area of focal induration and fluctuance of 4 cm x 5 cm just proximal to the antecubital fossa with adjacent induration warmth and erythema in the surrounding 15 cm x 3 cm region on the distal upper arm.    - Motor: Full ROM at shoulder, elbow (somewhat limited by pain in the soft tissues), wrist; 5/5 wrist flexion/extension, thumb IP joint flexion/extension (AIN/PIN), abduction/adduction (ulnar) intact  - Sensation intact to median/ulnar/radial nerves  - 2+ radial pulse, < 2 sec cap refill x 5 digits    DIAGNOSTIC STUDIES / PROCEDURES    LABS  Labs Reviewed   CBC WITH DIFFERENTIAL - Abnormal; Notable for the following components:       Result Value    MCHC 31.2 (*)     RDW 62.4 (*)     All other components within normal limits   BASIC METABOLIC PANEL - Abnormal; Notable for the following components:    Glucose 114 (*)     Bun 7 (*)     All other components within normal limits   LACTIC ACID - Abnormal; Notable for the following components:    Lactic Acid 2.1 (*)     All other components within normal limits   LACTIC ACID   HCG QUAL SERUM   ESTIMATED GFR   LACTIC ACID   BLOOD CULTURE    Narrative:     1 of 2 for Blood Culture x 2 sites order. Per Hospital  Policy: Only change Specimen Src: to \"Line\" if specified by  physician order.   BLOOD CULTURE   CULTURE WOUND W/ GRAM STAIN     RADIOLOGY  I have independently interpreted the diagnostic imaging associated with this visit and am waiting the final reading from the radiologist.   My preliminary interpretation is " as follows: No appreciable focal infiltrates  Radiologist interpretation:   DX-CHEST-PORTABLE (1 VIEW)   Final Result         1.  No acute cardiopulmonary disease.        COURSE & MEDICAL DECISION MAKING    ED Observation Status? No; Patient does not meet criteria for ED Observation.     INITIAL ASSESSMENT, COURSE AND PLAN  Care Narrative: Patient is seen and evaluated for symptoms as described above.  The patient has a known IV drug user with no appreciable murmur and no prior history of endocarditis it was seen for areas of cellulitis without true progression abscess and now presents back after being on multiple days of antibiotics with worsening arm pain, fevers chills and some tachycardia.  While she does not have a measurable fever in triage concern with her comorbidities and risk factors could be progression to sepsis.  She has failed outpatient therapy and I believe this necessitates further blood work and administration of empiric antibiotics.  Consulted with pharmacy who started on Rocephin and vancomycin, bedside ultrasound is used over the area of fluctuance and shows a small abscess.  Regional anesthesia is given and incision and drainage performed per my note below.    Lab work shows no significant leukocytosis, no concerning anemia, no gross electrolyte derangements or elevated lactate.  No MARIA EUGENIA.  Pregnancy test is negative, blood cultures are pending.  Wound culture also pending.    Procedure - Incision and Drainage  Patient consented for procedure following description of procedure and pros/cons explained.  Cleaned and draped per hospital procedure.  Pt positioned appropriately,  3cc  lidocaine with epinephrine was used as a local anesthetic. #11 blade scalpal used for single incision. Additional local anesthetic injected into surrounding viable tissue prior to blunt dissection of loculated adhesions. Copius drainage of pus (culture obtained). Procedure tolerated without complications. Wound dressed  "with sterile 4x4 guaze and paper tape.     Patient will necessitate admission to the hospital for further observation and antibiosis.  Discussed and accepted by admitting physician.    HYDRATION: Based on the patient's presentation of Sepsis and Tachycardia the patient was given IV fluids. IV Hydration was used because oral hydration was not adequate alone. Upon recheck following hydration, the patient was improving.      I examined the patient 3/14/2023 6:15 AM  Vital Signs:/60   Pulse 66   Temp 36.7 °C (98.1 °F) (Temporal)   Resp 14   Ht 1.626 m (5' 4\")   Wt (!) 154 kg (339 lb 4.6 oz)   SpO2 98%   BMI 58.24 kg/m²   Cardiac examination significant for Regular rate and rhythm  Pulmonary examination significant for Clear lung fileds  Capillary refill is brisk  Peripheral Pulse is 2+   Skin is normal    FINAL DIAGNOSIS  1. Cellulitis of left upper extremity    2. IVDU (intravenous drug user)    3. Fever and chills      Electronically signed by: Librado Puga M.D., 3/14/2023 4:21 AM  "

## 2023-03-14 NOTE — ED NOTES
Phase 2 Recovery Summary  Patient arrived to Phase 2 at 0953  Report received from Mariaa Olmedo:    05/29/19 0945 05/29/19 0953 05/29/19 0954 05/29/19 0955   BP: (!) 175/100 152/88 152/88    Pulse: 81 81 81    Resp: 16 15  16   Temp: 98 °F (36.7 °C)      SpO2: 99% 98% 98%    Weight:       Height:           oriented to time, place, person and situation    Lines and Drains  Peripheral Intravenous Line:   Peripheral IV 05/29/19 Right Hand (Active)   Site Assessment Clean, dry, & intact 5/29/2019  9:57 AM   Phlebitis Assessment 0 5/29/2019  9:57 AM   Infiltration Assessment 0 5/29/2019  9:57 AM   Dressing Status Clean, dry, & intact 5/29/2019  9:57 AM   Dressing Type Transparent 5/29/2019  9:57 AM   Hub Color/Line Status Blue 5/29/2019  7:56 AM   Action Taken Dressing reinforced 5/29/2019  7:56 AM   Alcohol Cap Used Yes 5/29/2019  7:56 AM                 Patient discharged to home with Ghada Ramon.     Stefanie Castillo RN Pt transported via [facility] to [facility]-, all belongings with patient

## 2023-03-14 NOTE — HOSPITAL COURSE
Mitzi Abel is a 22 y.o. female who presented 3/14/2023 with left arm pain and swelling.  Patient has a history of IV drug use, fentanyl and meth, and was recently seen on 3/11 due to skin infection appears to be cellulitis at the site of her drug injection, she reports she missed her vein and popped into her skin and this is why she got infected.  She was started on Bactrim but has had worsening pain, now with increased swelling and expanding redness around the site.  This morning she awoke feeling chills.  Does not have any nausea/vomiting, reports having had a sore throat for the past week, no shortness of breath, she has OxyMask on as she reports she has sleep apnea and when she falls asleep her oxygen goes down.     In the ED she was not found to be septic, white count 6.2, vital signs stable and afebrile.  Bedside ultrasound and drainage of abscess was performed by the EDP.    Blood cultures were drawn.  Vanco and ceftriaxone were started.  In the middle of the night patient decided that she would like to leave AGAINST MEDICAL ADVICE.  The night APRN was notified.  We are unable to prescribe provide prescriptions due to the late nature of the decision to leave AGAINST MEDICAL ADVICE.

## 2023-03-14 NOTE — H&P
Hospital Medicine History & Physical Note    Date of Service  3/14/2023    Primary Care Physician  RADHA Medina    Code Status  Full Code    Chief Complaint  Chief Complaint   Patient presents with    Arm Swelling     Pt with infection to L upper arm, was here 2 days ago and given oral antibiotics, redness and swelling are increasing along with the pain       History of Presenting Illness  Mitzi Abel is a 22 y.o. female who presented 3/14/2023 with left arm pain and swelling.  Patient has a history of IV drug use, fentanyl and meth, and was recently seen on 3/11 due to skin infection appears to be cellulitis at the site of her drug injection, she reports she missed her vein and popped into her skin and this is why she got infected.  She was started on Bactrim but has had worsening pain, now with increased swelling and expanding redness around the site.  This morning she awoke feeling chills.  Does not have any nausea/vomiting, reports having had a sore throat for the past week, no shortness of breath, she has OxiMax on as she reports she has sleep apnea and when she falls asleep her oxygen goes down.    In the ED she was not found to be septic, white count 6.2, vital signs stable and afebrile.  Bedside ultrasound and drainage of abscess was performed by the EDP.    I discussed the plan of care with patient.    Review of Systems  Review of Systems   Constitutional:  Positive for chills and fever. Negative for weight loss.   HENT:  Positive for sore throat. Negative for congestion and sinus pain.      Past Medical History   has a past medical history of Anxiety, Asthma, Bipolar 2 disorder, Borderline personality disorder (HCC), Eczema (10/15/2010), H/O: Bell's palsy (12/26/2008), IV drug user, Major depressive disorder, Myopia (10/15/2010), and Obesity.    Surgical History   has no past surgical history on file.     Family History  family history includes Alcohol/Drug in her father; Arthritis in  her maternal grandfather and mother; Cancer in her paternal grandfather; Diabetes in her mother and paternal grandfather; Heart Disease in her paternal grandfather; Hypertension in her father and paternal grandfather; Psychiatric Illness in her brother, father, and mother; Thyroid in her paternal grandmother.   Family history reviewed with patient. There is family history that is pertinent to the chief complaint.     Social History   reports that she has been smoking cigarettes. She has a 12.00 pack-year smoking history. She has never used smokeless tobacco. She reports that she does not currently use alcohol. She reports current drug use. Drugs: Intravenous and Injected (Skin Popping).    Allergies  Allergies   Allergen Reactions    Penicillins Anaphylaxis       Medications  Prior to Admission Medications   Prescriptions Last Dose Informant Patient Reported? Taking?   Levonorgestrel (MIRENA, 52 MG, IU)   Yes No   Sig: by Intrauterine route.   Naloxone (NARCAN) 4 MG/0.1ML Liquid   No No   Sig: Administer 4 mg into affected nostril(S) one time as needed (Drug overdose and respiratory depression) for up to 1 dose.   cephALEXin (KEFLEX) 500 MG Cap   No No   Sig: Take 1 Capsule by mouth 4 times a day for 5 days.   ibuprofen (MOTRIN) 600 MG Tab   No No   Sig: Take 1 Tablet by mouth every 6 hours as needed for Inflammation or Moderate Pain.   naltrexone (DEPADE) 50 MG Tab   Yes No   Sig: Take 50 mg by mouth 1 time a day as needed.   Patient not taking: Reported on 1/28/2023   phenazopyridine (PYRIDIUM) 200 MG Tab   No No   Sig: Take 1 Tablet by mouth 3 times a day as needed for Moderate Pain.   sulfamethoxazole-trimethoprim (BACTRIM DS) 800-160 MG tablet   No No   Sig: Take 1 Tablet by mouth 2 times a day for 5 days.      Facility-Administered Medications: None       Physical Exam  Temp:  [36.7 °C (98.1 °F)] 36.7 °C (98.1 °F)  Pulse:  [66-97] 70  Resp:  [14-20] 20  BP: (120-137)/(60-79) 126/60  SpO2:  [92 %-99 %] 97  %  Blood Pressure: 120/60   Temperature: 36.7 °C (98.1 °F)   Pulse: 66   Respiration: 14   Pulse Oximetry: 98 %       Physical Exam  Constitutional:       Appearance: She is obese.      Comments: Somnolent   HENT:      Head: Normocephalic and atraumatic.      Nose: Nose normal.      Mouth/Throat:      Mouth: Mucous membranes are moist.      Pharynx: Oropharynx is clear.      Comments: Hoarse voice  Eyes:      Extraocular Movements: Extraocular movements intact.      Pupils: Pupils are equal, round, and reactive to light.   Cardiovascular:      Rate and Rhythm: Normal rate and regular rhythm.      Pulses: Normal pulses.      Heart sounds: Normal heart sounds.   Pulmonary:      Effort: Pulmonary effort is normal.      Breath sounds: Normal breath sounds.   Abdominal:      General: Bowel sounds are normal.      Tenderness: There is no abdominal tenderness. There is no guarding or rebound.   Musculoskeletal:         General: Normal range of motion.      Cervical back: Normal range of motion and neck supple.      Right lower leg: No edema.      Left lower leg: Edema present.   Skin:     Capillary Refill: Capillary refill takes less than 2 seconds.      Comments: Left arm swollen, warm and red just proximal to the antecubital fossa, difficult to appreciate tenderness after local anesthetic and pain medicine given   Neurological:      Comments: Somnolent after pain medication, otherwise nonfocal exam       Laboratory:  Recent Labs     03/14/23  0357   WBC 6.2   RBC 4.85   HEMOGLOBIN 13.5   HEMATOCRIT 43.3   MCV 89.3   MCH 27.8   MCHC 31.2*   RDW 62.4*   PLATELETCT 200   MPV 10.2     Recent Labs     03/14/23  0357   SODIUM 138   POTASSIUM 4.2   CHLORIDE 104   CO2 22   GLUCOSE 114*   BUN 7*   CREATININE 0.75   CALCIUM 9.3     Recent Labs     03/14/23  0357   GLUCOSE 114*         No results for input(s): NTPROBNP in the last 72 hours.      No results for input(s): TROPONINT in the last 72 hours.    Imaging:  DX-CHEST-PORTABLE  (1 VIEW)   Final Result         1.  No acute cardiopulmonary disease.          X-Ray:  I have personally reviewed the images and compared with prior images. and My impression is: Chest x-ray does not show any pneumonia or infiltrates or edema    Assessment/Plan:  Justification for Admission Status  I anticipate this patient is appropriate for observation status at this time because need for IV antibiotics due to failure of outpatient treatment, follow blood cultures    Patient will need a Med/Surg bed on MEDICAL service .  The need is secondary to above.    * Cellulitis and abscess of upper extremity- (present on admission)  Assessment & Plan  Secondary to IV drug use, failed outpatient treatment, cellulitis progressed to abscess, drained in ED.  Started on ceftriaxone and vancomycin in ED, continue for now  Fluid/pus sent for culture, blood cultures as well, follow and further management per results      Sore throat  Assessment & Plan  Patient reports having sore throat for past week  We will check viral panel    Opiate dependence (HCC)- (present on admission)  Assessment & Plan  Currently no withdrawal symptoms.  Continue counseling on opiate cessation.    Morbid (severe) obesity due to excess calories (HCC)- (present on admission)  Assessment & Plan  BMI 58.24  Continue counseling on weight loss    Tobacco dependence- (present on admission)  Assessment & Plan  Patient was somnolent after pain medication and did not want to participate in conversation about cessation, continue counseling when appropriate        VTE prophylaxis: enoxaparin ppx

## 2023-03-15 VITALS
RESPIRATION RATE: 20 BRPM | TEMPERATURE: 97 F | SYSTOLIC BLOOD PRESSURE: 129 MMHG | OXYGEN SATURATION: 95 % | WEIGHT: 293 LBS | HEIGHT: 64 IN | BODY MASS INDEX: 50.02 KG/M2 | DIASTOLIC BLOOD PRESSURE: 64 MMHG | HEART RATE: 94 BPM

## 2023-03-15 PROCEDURE — G0378 HOSPITAL OBSERVATION PER HR: HCPCS

## 2023-03-15 PROCEDURE — 99239 HOSP IP/OBS DSCHRG MGMT >30: CPT

## 2023-03-15 NOTE — PROGRESS NOTES
"Received bedside report from day RN, pt care assumed. VSS on 2L o2, pt assessment complete. Pt A&Ox4, no c/o pain at this time. POC discussed with pt and verbalizes no questions. Pt denies any additional needs at this time. Bed locked and in lowest position, bed alarm on. Pt educated on fall risk and verbalized understanding, call light within reach, hourly rounding initiated.      Patient placed on pulse ox due to low o2 sat while sleeping. Patient educated on importance of keeping oxygen on while asleep. Patient continued to remove oxygen, despite education. Education reinforced.     2249-Pt took oxygen off, desating into the 70s. Again educated on importance of oxygen. Pt stated \" I don't want to wear it,its uncomfortable.\" Education again provided.     0000-Assisted pt to bathroom to assess oxygen during ambulation and at rest while awake. Pt o2 primarily desats during sleep.   "

## 2023-03-15 NOTE — DISCHARGE SUMMARY
Discharge Summary    CHIEF COMPLAINT ON ADMISSION  Chief Complaint   Patient presents with    Arm Swelling     Pt with infection to L upper arm, was here 2 days ago and given oral antibiotics, redness and swelling are increasing along with the pain       Reason for Admission  Abscess     Admission Date  3/14/2023    CODE STATUS  Prior    HPI & HOSPITAL COURSE  This is a 22 y.o. female here with left arm pain and swelling.   Mitzi Abel is a 22 y.o. female who presented 3/14/2023 with left arm pain and swelling.  Patient has a history of IV drug use, fentanyl and meth, and was recently seen on 3/11 due to skin infection appears to be cellulitis at the site of her drug injection, she reports she missed her vein and popped into her skin and this is why she got infected.  She was started on Bactrim but has had worsening pain, now with increased swelling and expanding redness around the site.  This morning she awoke feeling chills.  Does not have any nausea/vomiting, reports having had a sore throat for the past week, no shortness of breath, she has OxyMask on as she reports she has sleep apnea and when she falls asleep her oxygen goes down.     In the ED she was not found to be septic, white count 6.2, vital signs stable and afebrile.  Bedside ultrasound and drainage of abscess was performed by the EDP.    Blood cultures were drawn.  Vanco and ceftriaxone were started.  In the middle of the night patient decided that she would like to leave AGAINST MEDICAL ADVICE.  The night APRN was notified.  We are unable to prescribe provide prescriptions due to the late nature of the decision to leave AGAINST MEDICAL ADVICE.      Therefore, she is discharged in guarded and stable condition to home with close outpatient follow-up.    The patient recovered much more quickly than anticipated on admission.    Discharge Date  3/15/2023    FOLLOW UP ITEMS POST DISCHARGE  pcp    DISCHARGE DIAGNOSES  Principal Problem:     Cellulitis and abscess of upper extremity POA: Yes  Active Problems:    Sore throat POA: Unknown    Tobacco dependence POA: Yes    Morbid (severe) obesity due to excess calories (HCC) POA: Yes    Opiate dependence (HCC) POA: Yes  Resolved Problems:    * No resolved hospital problems. *      FOLLOW UP  No future appointments.  RADHA Medina  [address]  [facility] [id number] [phone]    Follow up        MEDICATIONS ON DISCHARGE     Medication List        STOP taking these medications      cephALEXin 500 MG Caps  Commonly known as: KEFLEX     ibuprofen 600 MG Tabs  Commonly known as: MOTRIN     MIRENA (52 MG) IU     Naloxone 4 MG/0.1ML Liqd  Commonly known as: Narcan     naltrexone 50 MG Tabs  Commonly known as: DEPADE     phenazopyridine 200 MG Tabs  Commonly known as: Pyridium     sulfamethoxazole-trimethoprim 800-160 MG tablet  Commonly known as: BACTRIM DS              Allergies  Allergies   Allergen Reactions    Penicillins Anaphylaxis       DIET  No orders of the defined types were placed in this encounter.      ACTIVITY  As tolerated.  Weight bearing as tolerated    CONSULTATIONS  none    PROCEDURES  Incision and drainage of abscess to left arm.    LABORATORY  Lab Results   Component Value Date    SODIUM 138 03/14/2023    POTASSIUM 4.2 03/14/2023    CHLORIDE 104 03/14/2023    CO2 22 03/14/2023    GLUCOSE 114 (H) 03/14/2023    BUN 7 (L) 03/14/2023    CREATININE 0.75 03/14/2023    CREATININE 0.6 12/23/2008        Lab Results   Component Value Date    WBC 6.2 03/14/2023    WBC 5.6 03/11/2011    HEMOGLOBIN 13.5 03/14/2023    HEMATOCRIT 43.3 03/14/2023    PLATELETCT 200 03/14/2023        Total time of the discharge process 34 minutes.

## 2023-03-15 NOTE — CARE PLAN
The patient is Stable - Low risk of patient condition declining or worsening         Progress made toward(s) clinical / shift goals:  awake, alert and calm. Education done ON POC, including need for IV abx, all questions and concerns were addressed.     Patient is not progressing towards the following goals:      Problem: Pain - Standard  Goal: Alleviation of pain or a reduction in pain to the patient’s comfort goal  Outcome: Progressing     Problem: Knowledge Deficit - Standard  Goal: Patient and family/care givers will demonstrate understanding of plan of care, disease process/condition, diagnostic tests and medications  Outcome: Progressing

## 2023-03-15 NOTE — PROGRESS NOTES
Pt advised this RN that she would like to leave the hospital. RN notified TODD Gallo. IV removed, pt belongings packed and patient left with family.

## 2023-03-15 NOTE — PROGRESS NOTES
4 Eyes Skin Assessment Completed by Alma Delia Cruz, RN and PAVITHRA Uriostegui.    Head WDL  Ears WDL  Nose WDL  Mouth WDL  Neck WDL  Breast/Chest Redness and Bruising, right anterior chest  Shoulder Blades WDL  Spine WDL  (R) Arm/Elbow/Hand WDL  (L) Arm/Elbow/Hand Redness, Abrasion, and Swelling  Abdomen WDL  Groin WDL  Scrotum/Coccyx/Buttocks WDL  (R) Leg WDL  (L) Leg WDL  (R) Heel/Foot/Toe WDL  (L) Heel/Foot/Toe WDL          Devices In Places Pulse Ox      Interventions In Place N/A    Possible Skin Injury No    Pictures Uploaded Into Epic N/A  Wound Consult Placed N/A  RN Wound Prevention Protocol Ordered No

## 2023-03-16 LAB
BACTERIA WND AEROBE CULT: ABNORMAL
BACTERIA WND AEROBE CULT: ABNORMAL
GRAM STN SPEC: ABNORMAL
SIGNIFICANT IND 70042: ABNORMAL
SITE SITE: ABNORMAL
SOURCE SOURCE: ABNORMAL
